# Patient Record
Sex: FEMALE | Race: BLACK OR AFRICAN AMERICAN | NOT HISPANIC OR LATINO | Employment: UNEMPLOYED | URBAN - METROPOLITAN AREA
[De-identification: names, ages, dates, MRNs, and addresses within clinical notes are randomized per-mention and may not be internally consistent; named-entity substitution may affect disease eponyms.]

---

## 2020-10-19 ENCOUNTER — PATIENT MESSAGE (OUTPATIENT)
Dept: INTERNAL MEDICINE | Facility: CLINIC | Age: 65
End: 2020-10-19

## 2020-10-19 ENCOUNTER — HOSPITAL ENCOUNTER (OUTPATIENT)
Dept: RADIOLOGY | Facility: HOSPITAL | Age: 65
Discharge: HOME | End: 2020-10-19
Attending: FAMILY MEDICINE
Payer: MEDICARE

## 2020-10-19 ENCOUNTER — OFFICE VISIT (OUTPATIENT)
Dept: INTERNAL MEDICINE | Facility: CLINIC | Age: 65
End: 2020-10-19
Payer: MEDICARE

## 2020-10-19 VITALS
DIASTOLIC BLOOD PRESSURE: 100 MMHG | SYSTOLIC BLOOD PRESSURE: 160 MMHG | WEIGHT: 234.13 LBS | HEIGHT: 67 IN | BODY MASS INDEX: 36.75 KG/M2 | TEMPERATURE: 97 F | OXYGEN SATURATION: 99 % | HEART RATE: 81 BPM

## 2020-10-19 DIAGNOSIS — M25.561 CHRONIC PAIN OF BOTH KNEES: ICD-10-CM

## 2020-10-19 DIAGNOSIS — M17.0 PRIMARY LOCALIZED OSTEOARTHRITIS OF KNEES, BILATERAL: Primary | ICD-10-CM

## 2020-10-19 DIAGNOSIS — Z78.0 ASYMPTOMATIC MENOPAUSAL STATE: ICD-10-CM

## 2020-10-19 DIAGNOSIS — Z11.59 ENCOUNTER FOR HEPATITIS C SCREENING TEST FOR LOW RISK PATIENT: ICD-10-CM

## 2020-10-19 DIAGNOSIS — Z23 NEED FOR SHINGLES VACCINE: ICD-10-CM

## 2020-10-19 DIAGNOSIS — Z11.4 SCREENING FOR HIV WITHOUT PRESENCE OF RISK FACTORS: ICD-10-CM

## 2020-10-19 DIAGNOSIS — E66.01 CLASS 2 SEVERE OBESITY DUE TO EXCESS CALORIES WITH SERIOUS COMORBIDITY AND BODY MASS INDEX (BMI) OF 37.0 TO 37.9 IN ADULT: Primary | ICD-10-CM

## 2020-10-19 DIAGNOSIS — I10 ESSENTIAL HYPERTENSION: ICD-10-CM

## 2020-10-19 DIAGNOSIS — G89.29 CHRONIC PAIN OF BOTH KNEES: ICD-10-CM

## 2020-10-19 DIAGNOSIS — Z12.31 BREAST CANCER SCREENING BY MAMMOGRAM: ICD-10-CM

## 2020-10-19 DIAGNOSIS — M17.0 PRIMARY LOCALIZED OSTEOARTHRITIS OF KNEES, BILATERAL: ICD-10-CM

## 2020-10-19 DIAGNOSIS — Z23 NEED FOR VACCINATION WITH 13-POLYVALENT PNEUMOCOCCAL CONJUGATE VACCINE: ICD-10-CM

## 2020-10-19 DIAGNOSIS — M25.562 CHRONIC PAIN OF BOTH KNEES: ICD-10-CM

## 2020-10-19 PROBLEM — E66.812 CLASS 2 SEVERE OBESITY DUE TO EXCESS CALORIES WITH SERIOUS COMORBIDITY AND BODY MASS INDEX (BMI) OF 37.0 TO 37.9 IN ADULT: Status: ACTIVE | Noted: 2020-10-19

## 2020-10-19 PROCEDURE — 73562 X-RAY EXAM OF KNEE 3: CPT | Mod: TC,50

## 2020-10-19 PROCEDURE — 73562 XR KNEE ORTHO BILAT: ICD-10-PCS | Mod: 26,50,, | Performed by: RADIOLOGY

## 2020-10-19 PROCEDURE — 90694 VACC AIIV4 NO PRSRV 0.5ML IM: CPT | Mod: S$GLB,,, | Performed by: FAMILY MEDICINE

## 2020-10-19 PROCEDURE — 3008F BODY MASS INDEX DOCD: CPT | Mod: CPTII,S$GLB,, | Performed by: FAMILY MEDICINE

## 2020-10-19 PROCEDURE — G0008 FLU VACCINE - QUADRIVALENT - ADJUVANTED: ICD-10-PCS | Mod: S$GLB,,, | Performed by: FAMILY MEDICINE

## 2020-10-19 PROCEDURE — 99999 PR PBB SHADOW E&M-NEW PATIENT-LVL V: CPT | Mod: PBBFAC,,, | Performed by: FAMILY MEDICINE

## 2020-10-19 PROCEDURE — G0009 PNEUMOCOCCAL CONJUGATE VACCINE 13-VALENT LESS THAN 5YO & GREATER THAN: ICD-10-PCS | Mod: S$GLB,,, | Performed by: FAMILY MEDICINE

## 2020-10-19 PROCEDURE — G0008 ADMIN INFLUENZA VIRUS VAC: HCPCS | Mod: S$GLB,,, | Performed by: FAMILY MEDICINE

## 2020-10-19 PROCEDURE — 90670 PNEUMOCOCCAL CONJUGATE VACCINE 13-VALENT LESS THAN 5YO & GREATER THAN: ICD-10-PCS | Mod: S$GLB,,, | Performed by: FAMILY MEDICINE

## 2020-10-19 PROCEDURE — 99499 UNLISTED E&M SERVICE: CPT | Mod: S$GLB,,, | Performed by: FAMILY MEDICINE

## 2020-10-19 PROCEDURE — G0009 ADMIN PNEUMOCOCCAL VACCINE: HCPCS | Mod: S$GLB,,, | Performed by: FAMILY MEDICINE

## 2020-10-19 PROCEDURE — 99203 OFFICE O/P NEW LOW 30 MIN: CPT | Mod: 25,S$GLB,, | Performed by: FAMILY MEDICINE

## 2020-10-19 PROCEDURE — 99999 PR PBB SHADOW E&M-NEW PATIENT-LVL V: ICD-10-PCS | Mod: PBBFAC,,, | Performed by: FAMILY MEDICINE

## 2020-10-19 PROCEDURE — 90694 FLU VACCINE - QUADRIVALENT - ADJUVANTED: ICD-10-PCS | Mod: S$GLB,,, | Performed by: FAMILY MEDICINE

## 2020-10-19 PROCEDURE — 1101F PR PT FALLS ASSESS DOC 0-1 FALLS W/OUT INJ PAST YR: ICD-10-PCS | Mod: CPTII,S$GLB,, | Performed by: FAMILY MEDICINE

## 2020-10-19 PROCEDURE — 1101F PT FALLS ASSESS-DOCD LE1/YR: CPT | Mod: CPTII,S$GLB,, | Performed by: FAMILY MEDICINE

## 2020-10-19 PROCEDURE — 3008F PR BODY MASS INDEX (BMI) DOCUMENTED: ICD-10-PCS | Mod: CPTII,S$GLB,, | Performed by: FAMILY MEDICINE

## 2020-10-19 PROCEDURE — 90670 PCV13 VACCINE IM: CPT | Mod: S$GLB,,, | Performed by: FAMILY MEDICINE

## 2020-10-19 PROCEDURE — 73562 X-RAY EXAM OF KNEE 3: CPT | Mod: 26,50,, | Performed by: RADIOLOGY

## 2020-10-19 PROCEDURE — 99499 RISK ADDL DX/OHS AUDIT: ICD-10-PCS | Mod: S$GLB,,, | Performed by: FAMILY MEDICINE

## 2020-10-19 PROCEDURE — 99203 PR OFFICE/OUTPT VISIT, NEW, LEVL III, 30-44 MIN: ICD-10-PCS | Mod: 25,S$GLB,, | Performed by: FAMILY MEDICINE

## 2020-10-19 RX ORDER — CHLORTHALIDONE 25 MG/1
25 TABLET ORAL DAILY
Qty: 30 TABLET | Refills: 1 | Status: SHIPPED | OUTPATIENT
Start: 2020-10-19 | End: 2020-11-13 | Stop reason: SDUPTHER

## 2020-10-19 RX ORDER — TRAMADOL HYDROCHLORIDE 50 MG/1
50 TABLET ORAL EVERY 6 HOURS PRN
COMMUNITY
End: 2023-08-17

## 2020-10-19 NOTE — PATIENT INSTRUCTIONS
Try to get more potassium in your diet. Foods high in potassium include white beans, potatoes and sweet potatoes, beets, spinach, tomato sauce, oranges and orange juice, bananas, avocados, yogurt, and salmon.      I've entered a referral order for you to be able to participate in [facility]'s excellent Comprehensive Weight Loss Program. Call [phone] to schedule your appointment. You can learn more about the program at [email or web address]      I've entered an order for you to be allowed to enroll in [facility]'s Hypertension Digital Medicine program. It allows you to use a good quality electronic blood pressure monitor to measure your blood pressure at home. Those numbers are then automatically sent by your smartphone to your hypertension care team for review. The program also includes excellent heart-health lifestyle coaching.    You'll receive regular feedback from your care team, including recommendations for any needed medication adjustments, exercise and healthy eating tips, heart-healthy coaching, and monthly progress reports.    Enrolling is easy and quick. Please take the time right now to stop by the [facility] in the first-floor lobby to get started. You can also call the [facility] Help Desk at [phone] for help getting set up and for any technical support.

## 2020-10-19 NOTE — LETTER
ATTN: CARE COORDINATION   PATIENT IDENTIFYING INFORMATION:  LACEY JORDAN   [address] [address] YOB: 1955  OUR MRN: [id number]   Home Phone [phone]   Work Phone Not on file.   Mobile [phone]        RECORDS REQUESTED FROM:   NAME OF HOSPITAL PHYSICIAN OR OTHER ENTITY  [facility]   ADDRESS PHONE         AUTHORIZATION:  For the purpose of continuity of care, I, Lacey Calderón, hereby authorize the hospital, physician, or entity named above to release my medical records for the dates of service specified above to: JAKE Merrill MD; [facility]; [address]; [place] [address]; PH: [phone]    REQUESTED METHOD OF DELIVERY: Fax ([phone]) or secure Email ([email or web address])  --------------------------------  SPECIFIC RECORDS REQUESTED:  colonoscopy and any other form of colorectal cancer screening    DATES OF SERVICE REQUESTED: 01/01/2015 through the present date  --------------------------------    In authorizing the release of the confidential information identified above, I hereby waive all restrictions or privileges imposed by law and release [facility] and Its affiliates and their staff from any restriction or privilege Imposed by law in connection with the disclosure or release of any professional record, observation or communication. I do understand that the information that is being released may be subject to re-disclosure by the recipient and may no longer be protected. I understand that my treatment, payment, enrollment or eligibility for benefits may not be conditioned on signing this authorization.  This authorization may be revoked in writing at any time, except to the extent that [facility] and its affiliates have already taken action in reliance on it. Letters to revoke this authorization should be addressed to [facility], Release  of Information Department, [address] [place] [id number].     If not previously revoked in writing, this authorization will terminate or  12 months after the date signed below.                   Signature of Patient    Date Signed

## 2020-10-19 NOTE — PROGRESS NOTES
CHIEF COMPLAINT  Establish Care    This is my first time treating her here. All problems addressed today are NEW TO ME.  Lacey is requesting that I take over as her primary care provider.     DIAGNOSES, HISTORY, ASSESSMENT, AND PLAN  Assessment   1. Class 2 severe obesity due to excess calories with serious comorbidity and body mass index (BMI) of 37.0 to 37.9 in adult    2. Primary localized osteoarthritis of knees, bilateral    3. Chronic pain of both knees    4. Essential hypertension    5. Screening for HIV without presence of risk factors    6. Encounter for hepatitis C screening test for low risk patient    7. Asymptomatic menopausal state    8. Breast cancer screening by mammogram    9. Need for shingles vaccine    10. Need for vaccination with 13-polyvalent pneumococcal conjugate vaccine         Orders Placed This Encounter    DXA Bone Density Spine And Hip    Mammo Digital Screening Bilat w/ Karel    Pneumococcal Conjugate Vaccine (13 Valent) (IM)    Influenza - Quadrivalent (Adjuvanted)    Comprehensive Metabolic Panel    Lipid Panel    Hepatitis C Antibody    TSH    HIV 1/2 Ag/Ab (4th Gen)    Ambulatory referral/consult to Weight Management Program    [facility]    varicella-zoster gE-AS01B, PF, (SHINGRIX) 50 mcg/0.5 mL injection    chlorthalidone (HYGROTEN) 25 MG Tab    [facility] Digital [facility] Assign Onboarding Questionnaires       Except as noted herein, any chronic conditions are compensated/controlled and stable, and no other significant new complaints or concerns reported.     Plan   Problem List Items Addressed This Visit     Primary localized osteoarthritis of knees, bilateral    Class 2 severe obesity due to excess calories with serious comorbidity and body mass index (BMI) of 37.0 to 37.9 in adult - Primary    Relevant Orders    TSH    Ambulatory referral/consult to Weight Management Program    Essential hypertension     Relevant Medications    chlorthalidone (HYGROTEN) 25 MG Tab    Other Relevant Orders    Comprehensive Metabolic Panel    Lipid Panel    TSH    Hypertension Digital Medicine (HDMP) Enrollment Order (Completed)    Hypertension Digital Medicine ([facility]): Assign Onboarding Questionnaires (Completed)    Chronic pain of both knees      Other Visit Diagnoses     Screening for HIV without presence of risk factors        Relevant Orders    HIV 1/2 Ag/Ab (4th Gen)    Encounter for hepatitis C screening test for low risk patient        Relevant Orders    Hepatitis C Antibody    Asymptomatic menopausal state        Relevant Orders    DXA Bone Density Spine And Hip    Breast cancer screening by mammogram        Relevant Orders    Mammo Digital Screening Bilat w/ Karel    Need for shingles vaccine        Relevant Medications    varicella-zoster gE-AS01B, PF, (SHINGRIX) 50 mcg/0.5 mL injection    Need for vaccination with 13-polyvalent pneumococcal conjugate vaccine        Relevant Orders    Pneumococcal Conjugate Vaccine (13 Valent) (IM) (Completed)          Outpatient Medications Prior to Visit   Medication Sig Dispense Refill    traMADoL (ULTRAM) 50 mg tablet Take 50 mg by mouth every 6 (six) hours as needed for Pain.       No facility-administered medications prior to visit.         There are no discontinued medications.     Medications Ordered This Encounter   Medications    chlorthalidone (HYGROTEN) 25 MG Tab     Sig: Take 1 tablet (25 mg total) by mouth once daily.     Dispense:  30 tablet     Refill:  1     - LABS REQUIRED FOR MORE REFILLS    varicella-zoster gE-AS01B, PF, (SHINGRIX) 50 mcg/0.5 mL injection     Sig: Inject 0.5 mL (one dose) into muscle now; give second dose at least 2 months later     Dispense:  1 each     Refill:  1       Subjective   Review of Systems   Constitutional: Negative for chills and fever.   HENT: Negative for ear pain and trouble swallowing.    Eyes: Negative for pain and visual disturbance.  "  Respiratory: Negative for chest tightness and shortness of breath.    Cardiovascular: Negative for chest pain and palpitations.   Gastrointestinal: Negative for abdominal pain and blood in stool.   Endocrine: Negative for polydipsia and polyuria.   Genitourinary: Negative.  Negative for difficulty urinating, dysuria and hematuria.   Musculoskeletal: Negative for gait problem and joint swelling. Arthralgias:  knees (chronic x years)   Integumentary:  Negative for rash and wound.   Allergic/Immunologic: Negative for immunocompromised state.   Neurological: Negative for vertigo and syncope.   Hematological: Negative.    Psychiatric/Behavioral: Negative for agitation and confusion.        Objective   Vitals:    10/19/20 0852 10/19/20 0925   BP: (!) 150/88 (!) 160/100   BP Location: Left arm Left arm   Patient Position: Sitting Sitting   BP Method: Large (Manual) Large (Manual)   Pulse: 81    Temp: 97.2 °F (36.2 °C)    TempSrc: Tympanic    SpO2: 99%    Weight: 106.2 kg (234 lb 2.1 oz)    Height: 5' 6.5" (1.689 m)      Physical Exam  Vitals signs reviewed.   Constitutional:       General: She is not in acute distress.     Appearance: Normal appearance.   Eyes:      General: No scleral icterus.     Conjunctiva/sclera: Conjunctivae normal.   Neck:      Musculoskeletal: No muscular tenderness.      Vascular: No carotid bruit.   Cardiovascular:      Rate and Rhythm: Normal rate and regular rhythm.      Heart sounds: Normal heart sounds.   Pulmonary:      Effort: Pulmonary effort is normal. No respiratory distress.      Breath sounds: Normal breath sounds. No wheezing or rhonchi.   Abdominal:      General: Bowel sounds are normal. There is no distension.      Palpations: Abdomen is soft. There is no mass.      Tenderness: There is no abdominal tenderness.   Lymphadenopathy:      Cervical: No cervical adenopathy.   Skin:     General: Skin is warm and dry.      Coloration: Skin is not jaundiced.   Neurological:      General: " "No focal deficit present.      Mental Status: She is alert and oriented to person, place, and time.   Psychiatric:         Mood and Affect: Mood normal.         Behavior: Behavior normal.         Judgment: Judgment normal.         TOTAL TIME evaluating and managing this patient for this encounter exceeded 30 minutes, the majority spent counseling and coordinating care for the listed diagnoses.   Documentation entered by me for this encounter may have been done in part using speech-recognition technology. Although I have made an effort to ensure accuracy, "sound like" errors may exist and should be interpreted in context. -JAKE Merrill MD.    "

## 2020-10-20 ENCOUNTER — TELEPHONE (OUTPATIENT)
Dept: ORTHOPEDICS | Facility: CLINIC | Age: 65
End: 2020-10-20

## 2020-10-20 ENCOUNTER — HOSPITAL ENCOUNTER (OUTPATIENT)
Dept: RADIOLOGY | Facility: HOSPITAL | Age: 65
Discharge: HOME | End: 2020-10-20
Attending: FAMILY MEDICINE
Payer: MEDICARE

## 2020-10-20 DIAGNOSIS — Z12.31 BREAST CANCER SCREENING BY MAMMOGRAM: ICD-10-CM

## 2020-10-20 PROCEDURE — 77063 MAMMO DIGITAL SCREENING BILAT WITH TOMO: ICD-10-PCS | Mod: 26,,, | Performed by: RADIOLOGY

## 2020-10-20 PROCEDURE — 77067 SCR MAMMO BI INCL CAD: CPT | Mod: 26,,, | Performed by: RADIOLOGY

## 2020-10-20 PROCEDURE — 77063 BREAST TOMOSYNTHESIS BI: CPT | Mod: 26,,, | Performed by: RADIOLOGY

## 2020-10-20 PROCEDURE — 77067 SCR MAMMO BI INCL CAD: CPT | Mod: TC

## 2020-10-20 PROCEDURE — 77067 MAMMO DIGITAL SCREENING BILAT WITH TOMO: ICD-10-PCS | Mod: 26,,, | Performed by: RADIOLOGY

## 2020-10-20 NOTE — TELEPHONE ENCOUNTER
"Lacey Fischer.    Your knee x-rays showed fairly severe "degenerative" or "wear-and-tear" type changes. The x-rays did NOT show any fracture or dislocation.    I have entered a referral order for you to be treated by one of our excellent orthopedic surgeons (a specialist that diagnoses and treats diseases, injuries, and defects of bones and joints) to discuss your treatment options.    Someone from [facility] will be contacting you soon to help you schedule this appointment. If you haven't been contacted by Wednesday afternoon, send my staff a message, and they will be glad to help.    Thanks for letting me care for you, and thanks for trusting [facility] with your healthcare needs.    Take care and be well.    Sincerely,    JAKE Merrill MD   Orders Placed This Encounter   Procedures    Ambulatory referral/consult to Orthopedics      "

## 2020-10-20 NOTE — PROGRESS NOTES
"Lacey Fischer.    Your knee x-rays showed fairly severe "degenerative" or "wear-and-tear" type changes. The x-rays did NOT show any fracture or dislocation.    I have entered a referral order for you to be treated by one of our excellent orthopedic surgeons (a specialist that diagnoses and treats diseases, injuries, and defects of bones and joints) to discuss your treatment options.    Someone from [facility] will be contacting you soon to help you schedule this appointment. If you haven't been contacted by Wednesday afternoon, send my staff a message, and they will be glad to help.    Thanks for letting me care for you, and thanks for trusting [facility] with your healthcare needs.    Take care and be well.    Sincerely,    JAKE Merrill MD"

## 2020-10-24 PROBLEM — E78.00 PURE HYPERCHOLESTEROLEMIA: Chronic | Status: ACTIVE | Noted: 2020-10-24

## 2020-10-24 NOTE — PROGRESS NOTES
"Lacey Fischer.    I am proud of you for getting your breast cancer screening done!    The radiologist interpreted your test result findings as INCOMPLETE. This category is also called "BI-RADS 0." This means the radiologist may have seen a possible abnormality, but it was not clear.    This result category may mean that you need more tests, such as another mammogram with the use of spot compression (applying compression to a smaller area when doing the mammogram), magnified views, special mammogram views, or ultrasound. This category may also suggest that the radiologist wants to compare your new mammogram with older ones to see if there have been changes in the area over time.    Someone from [facility] breast imaging center should be contacting you with details. Let me know if you haven't heard from them within the next two weeks.    I'll share your final (complete) breast imaging test results with you once I have received them.    Thanks for letting me care for you, and thanks for trusting [facility] with your healthcare needs.    Sincerely,    JAKE Merrill MD"

## 2020-10-26 ENCOUNTER — TELEPHONE (OUTPATIENT)
Dept: ORTHOPEDICS | Facility: CLINIC | Age: 65
End: 2020-10-26

## 2020-10-26 ENCOUNTER — TELEPHONE (OUTPATIENT)
Dept: RADIOLOGY | Facility: HOSPITAL | Age: 65
End: 2020-10-26

## 2020-10-26 ENCOUNTER — OFFICE VISIT (OUTPATIENT)
Dept: ORTHOPEDICS | Facility: CLINIC | Age: 65
End: 2020-10-26
Payer: MEDICARE

## 2020-10-26 VITALS — BODY MASS INDEX: 36.73 KG/M2 | HEIGHT: 67 IN | WEIGHT: 234 LBS

## 2020-10-26 DIAGNOSIS — R26.9 GAIT ABNORMALITY: ICD-10-CM

## 2020-10-26 DIAGNOSIS — M17.0 PRIMARY LOCALIZED OSTEOARTHRITIS OF KNEES, BILATERAL: Primary | ICD-10-CM

## 2020-10-26 DIAGNOSIS — G89.29 CHRONIC PAIN OF BOTH KNEES: ICD-10-CM

## 2020-10-26 DIAGNOSIS — M25.562 CHRONIC PAIN OF BOTH KNEES: ICD-10-CM

## 2020-10-26 DIAGNOSIS — M25.561 CHRONIC PAIN OF BOTH KNEES: ICD-10-CM

## 2020-10-26 PROCEDURE — 99204 PR OFFICE/OUTPT VISIT, NEW, LEVL IV, 45-59 MIN: ICD-10-PCS | Mod: S$GLB,,, | Performed by: PHYSICAL MEDICINE & REHABILITATION

## 2020-10-26 PROCEDURE — 99999 PR PBB SHADOW E&M-EST. PATIENT-LVL IV: ICD-10-PCS | Mod: PBBFAC,,, | Performed by: PHYSICAL MEDICINE & REHABILITATION

## 2020-10-26 PROCEDURE — 1101F PR PT FALLS ASSESS DOC 0-1 FALLS W/OUT INJ PAST YR: ICD-10-PCS | Mod: CPTII,S$GLB,, | Performed by: PHYSICAL MEDICINE & REHABILITATION

## 2020-10-26 PROCEDURE — 3008F BODY MASS INDEX DOCD: CPT | Mod: CPTII,S$GLB,, | Performed by: PHYSICAL MEDICINE & REHABILITATION

## 2020-10-26 PROCEDURE — 3008F PR BODY MASS INDEX (BMI) DOCUMENTED: ICD-10-PCS | Mod: CPTII,S$GLB,, | Performed by: PHYSICAL MEDICINE & REHABILITATION

## 2020-10-26 PROCEDURE — 99204 OFFICE O/P NEW MOD 45 MIN: CPT | Mod: S$GLB,,, | Performed by: PHYSICAL MEDICINE & REHABILITATION

## 2020-10-26 PROCEDURE — 99999 PR PBB SHADOW E&M-EST. PATIENT-LVL IV: CPT | Mod: PBBFAC,,, | Performed by: PHYSICAL MEDICINE & REHABILITATION

## 2020-10-26 PROCEDURE — 1101F PT FALLS ASSESS-DOCD LE1/YR: CPT | Mod: CPTII,S$GLB,, | Performed by: PHYSICAL MEDICINE & REHABILITATION

## 2020-10-26 NOTE — PATIENT INSTRUCTIONS
Recommend trying Voltaren Gel over the counter, and following the directions on the package.   Remember that you may need to use it consistently for several days before seeing results.        Getting Ready for Knee Replacement: Preparing for Your Recovery     Aerobic exercise, such as riding a stationary bike, can help improve your general fitness and ease your recovery.     Preparing for your knee replacement helps make your recovery faster and smoother. You can even prepare for your rehabilitation program that youll follow after surgery. It will help you strengthen and use your new knee.  Why preparing for recovery helps  The more in shape you are before surgery, the sooner youll be able to get back to activities you enjoy.  Help recovery faster by:  · Strengthening and stretching your leg muscles. This helps to support the knee as it heals. It also gives you a head start on rehabilitation.  · Preparing to use a walker or crutches. Learn to safely use walking aids before surgery. This will help you get up and around sooner. Strengthening your upper body can also make it easier to use walking aids.  · Preparing your home. Make some simple arrangements at home. These can prevent falls. They can also make daily tasks easier as you recover. This includes moving objects youll need within reach and asking in advance for help with certain chores. You should also remove objects on the floor that could cause a fall, such as loose rugs and cords.   You and your team  Your healthcare team may include:  · A physical therapist (PT). He or she will design an exercise program to build strength and aid recovery.  · An occupational therapist (OT). He or she will help you make daily activities safer and easier during recovery.  · An orthopaedic surgeon. He or she will perform the surgery and oversee your care.  · A nurse or . He or she will coordinate your care.  Understanding your role  When it comes to preparing  for recovery, much of the work is up to you. So make time each day for the exercises youve been given. Always follow the instructions that your healthcare team gives you.  Date Last Reviewed: 10/1/2015  © 6944-6547 [employer]. [address] [place] [address]. All rights reserved. This information is not intended as a substitute for professional medical care. Always follow your healthcare professional's instructions.        Total Knee Replacement  During total knee replacement surgery, your damaged knee joint is replaced with an artificial joint, called a prosthesis. This surgery almost always reduces joint pain and improves your quality of life.     The parts of the prosthesis are secured to the bones of the knee. Together they form the new joint.   Before your surgery  You will most likely arrive at the hospital on the morning of the surgery. Be sure to follow all of your doctors instructions on preparing for surgery:  · Follow any directions you are given for taking medicines or for not eating or drinking before surgery.  · At the hospital, your temperature, pulse, breathing, and blood pressure will be checked.  · An IV (intravenous) line will be started to provide fluids and medicines needed during surgery.  The surgical procedure  When the surgical team is ready, youll be taken to the operating room. There youll be given anesthesia to help you sleep through surgery, or to make you numb from the waist down. Then an incision is made on the front or side of your knee. Any damaged bone is cleaned away, and the new joint components are put into place. The incision is closed with surgical staples or stitches.  After your surgery  After surgery, youll be sent to the recovery room. When you are fully awake, youll be moved to your room. The nurses will give you medicines to ease your pain. You may have a catheter (small tube) in your bladder. A continuous passive motion machine may be used  on your knee to keep it from getting stiff. A sequential compression machine may be used to prevent blood clots by gently squeezing then releasing your leg. You may be given medicine to prevent blood clots. Soon, healthcare providers will help you get up and moving.  When to call your doctor  Once at home, call your doctor if you have any of the symptoms below:  · An increase in knee pain  · Pain or swelling in the calf or leg  · Unusual redness, heat, or drainage at the incision site  · Fever of 100.4°F (38°C) or higher  · Shaking chills  · Trouble breathing or chest pain (call 911)   Date Last Reviewed: 9/20/2015  © 2433-0336 [employer]. [address] [place] [address], [place] [address]. All rights reserved. This information is not intended as a substitute for professional medical care. Always follow your healthcare professional's instructions.        Conditioning Before Knee Replacement  Conditioning your body BEFORE knee replacement can help speed your recovery. Daily exercise helps strengthen muscles that support the knee joint. Aerobic activity (exercise that raises your heart rate) can improve fitness. It can also help you reach or maintain a healthy weight, reducing stress on your knee.  Before beginning any exercise program, talk with your healthcare provider.   Low-impact exercise  Try exercise that doesnt put too much weight on your knee, such as riding a stationary bike or swimming. This helps build strength in your knee with little stress on the joint. Start slowly. Then try to do a little more each day. Stop any exercise that causes increasing pain. You can also ask your healthcare provider or physical therapist about ways to manage pain during exercise.  Pool exercise     Walk in a level area of the pool.      Exercising in a pool is a gentle way to exercise muscles. It can improve balance and coordination. A physical therapist may work with you in a pool therapy program. You can also try pool  walking on your own.  waist-deep to chest-deep water with your arms out to the sides. Slowly walk forward. To avoid overdoing, ask your physical therapist or surgeon for guidelines on how long to exercise. He or she can also give you tips on pool safety.  Call your healthcare provider   Contact your healthcare provider if:   · You have questions about the exercises.  · There is increased pain or swelling in your knee after exercise.  · You need help learning to use your walker or crutches.   Date Last Reviewed: 10/1/2015  © 1964-4152 [employer]. [address] [place] [address]. All rights reserved. This information is not intended as a substitute for professional medical care. Always follow your healthcare professional's instructions.

## 2020-10-26 NOTE — LETTER
October 26, 2020      JAKE Merrill MD  [address]  [place] [address]           [facility]  [address] [place] [address]  Phone: [phone]  Fax: [phone]          Patient: Lacey Calderón   MR Number: [id number]   YOB: 1955   Date of Visit: 10/26/2020       Dear Dr. JAKE Merrill:    Thank you for referring Lacey Calderón to me for evaluation. Attached you will find relevant portions of my assessment and plan of care.    If you have questions, please do not hesitate to call me. I look forward to following Lacey Calderón along with you.    Sincerely,    Gwen Merritt MD    Enclosure  CC:  No Recipients    If you would like to receive this communication electronically, please contact [email or web address] or [phone] to request more information on [employer] access.    For providers and/or their staff who would like to refer a patient to [facility], please contact us through our one-stop-shop provider referral line, [facility], at [phone].    If you feel you have received this communication in error or would no longer like to receive these types of communications, please e-mail [email or web address]

## 2020-10-26 NOTE — TELEPHONE ENCOUNTER
Good afternoon,     Spoke w/ patient in regards to getting a closer doctor for her knees. Informed patient that Dr. Bassett is our knee specialist and he specializes in knee replacements. Patient states that was fine and agreed to keep appt. expressed to patient that if she needed anything to let us know. Patient was grateful for the call_DD      ----- Message from Itzel Bhatti sent at 10/26/2020  2:34 PM CDT -----  Pt called to speak with the office about the referral please reach out to pt at [phone]

## 2020-10-26 NOTE — PROGRESS NOTES
SPORTS MEDICINE / PM&R New Patient Visit :    Referring Physician: JAKE Merrill MD    Chief Complaint   Patient presents with    Right Knee - Pain    Left Knee - Pain       HPI: This is a 65 y.o.  female being seen in clinic today for evaluation of Pain of the Right Knee and Pain of the Left Knee  .  The problem began since her 30s. She state she fell down some stair in 1998. She complain of most of her pain in her right knee. She feels throbbing, aching, sore, stabbing and intermittent pain on her bilateral knee . The symptoms are worsening. She has tried heat, tylenol and rest. She states she takes Mobic, she doesn't take it anymore due to stomach pain. She also soaks in warm water without improvement. She has done therapy, but states it didn't help. She also mention she had 2 falls last week. She states she was walking and fell to the ground. She also walks with a cane.    History obtained from patient.    Past family, medical, social, surgical history, and vital signs reviewed in chart.    Review of Systems   Constitutional: Negative for chills, fever and weight loss.   HENT: Negative for hearing loss and sore throat.    Eyes: Negative for blurred vision, photophobia and pain.   Respiratory: Negative for shortness of breath.    Cardiovascular: Negative for chest pain.   Gastrointestinal: Negative for abdominal pain.   Genitourinary: Negative for dysuria.   Skin: Negative for rash.   Neurological: Negative for tingling and headaches.   Endo/Heme/Allergies: Does not bruise/bleed easily.   Psychiatric/Behavioral: Negative for depression.       General    Nursing note and vitals reviewed.  Constitutional: She is oriented to person, place, and time. She appears well-developed and well-nourished.   Obese   HENT:   Head: Normocephalic and atraumatic.   Eyes: Conjunctivae and EOM are normal. Pupils are equal, round, and reactive to light.   Neck: Neck supple.   Cardiovascular: Intact distal pulses.     Pulmonary/Chest: Effort normal. No respiratory distress.   Abdominal: She exhibits no distension.   Neurological: She is alert and oriented to person, place, and time.   Psychiatric: She has a normal mood and affect.     General Musculoskeletal Exam   Gait: antalgic       Right Knee Exam     Inspection   Erythema: absent  Swelling: absent  Effusion: present (small)  Deformity: absent    Tenderness   The patient is tender to palpation of the medial joint line and condyle.    Crepitus   The patient has crepitus of the patella.    Range of Motion   Extension:  5 abnormal   Flexion:  100 abnormal     Tests   Meniscus   Rogelio:  Medial - negative Lateral - negative  Ligament Examination Lachman: normal (-1 to 2mm) PCL-Posterior Drawer: normal (0 to 2mm)     MCL - Valgus: abnormal - grade I  LCL - Varus: normal  Patella   Patellar apprehension: negative  Patellar Tracking: normal  Patellar Grind: positive    Other   Popliteal (Baker's) Cyst: absent  Muscle Tightness: hamstring tightness  Sensation: normal    Left Knee Exam     Inspection   Erythema: absent  Swelling: absent  Effusion: absent  Deformity: absent  Bruising: absent    Tenderness   The patient tender to palpation of the medial joint line.    Crepitus   The patient has crepitus of the patella.    Range of Motion   Extension: normal   Flexion:  110 abnormal     Tests   Meniscus   Rogelio:  Medial - negative Lateral - negative  Stability Lachman: normal (-1 to 2mm) PCL-Posterior Drawer: normal (0 to 2mm)  MCL - Valgus: normal (0 to 2mm)  LCL - Varus: normal (0 to 2mm)  Patella   Patellar apprehension: negative  Patellar Tracking: normal  Patellar Grind: positive    Other   Popliteal (Baker's) Cyst: absent  Sensation: normal    Right Hip Exam   Right hip exam is normal.     Tests   Pain w/ forced internal rotation (PADMA): absent  Left Hip Exam   Left hip exam is normal.    Tests   Pain w/ forced internal rotation (PADMA): absent          Muscle Strength    Right Lower Extremity   Hip Abduction: 3/5   Hip Adduction: 5/5   Hip Flexion: 5/5   Quadriceps:  4/5   Hamstrin/5   Left Lower Extremity   Hip Abduction: 3/5   Hip Adduction: 5/5   Hip Flexion: 5/5   Quadriceps:  4/5   Hamstrin/5     Reflexes     Left Side  Achilles:  1+  Quadriceps:  1+    Right Side   Achilles:  1+  Quadriceps:  1+    Vascular Exam     Right Pulses  Dorsalis Pedis:      2+          Left Pulses  Dorsalis Pedis:      2+              X-ray Knee Ortho Bilateral    Result Date: 10/19/2020  EXAMINATION: XR KNEE ORTHO BILAT CLINICAL HISTORY: Bilateral primary osteoarthritis of knee TECHNIQUE: AP standing, lateral and Merchant views of both knees were performed. COMPARISON: None FINDINGS: Extensive the tricompartment degenerative changes noted with increased prominence and medial compartments, right greater than left.  There is lateral subluxation of the tibia bilaterally.  Prominent osteophyte formation best visualized on the anterior margin of the tibia on the lateral views.  Prominent degenerative change in the patellofemoral joints with lateral tilting of the patellae bilaterally.  No effusions. No fracture or dislocation. Calcific densities along posterior margins of the knee joints bilaterally. Cannot exclude loose bodies.     As above.  Follow-up and or further evaluation as warranted. Electronically signed by: Chito Camejo MD Date:    10/19/2020 Time:    12:59      IMPRESSION/PLAN: This is a 65 y.o.  female with:    Primary localized osteoarthritis of knees, bilateral  -     Ambulatory referral/consult to Orthopedics  -     Ambulatory referral/consult to Physical/Occupational Therapy; Future; Expected date: 2020  -     Ambulatory referral/consult to Orthopedics; Future; Expected date: 2020    Chronic pain of both knees  -     Ambulatory referral/consult to Orthopedics  -     Ambulatory referral/consult to Physical/Occupational Therapy; Future; Expected date: 2020  -      Ambulatory referral/consult to Orthopedics; Future; Expected date: 11/02/2020    Gait abnormality        The diagnosis and treatment options were discussed with Lacey in detail.  We reviewed her x-rays which show pretty severe arthritis in the right knee, worse in the left knee, medial aspect involved more on both sides.  She has not tolerated oral NSAIDs we will try a topical NSAID as Voltaren gel.  She was given a handout on this.  We discussed risks versus benefit of steroid injection.  She had not previously considered knee joint replacement, but is now interested in it.  We discussed that steroid injection can increase risk of infection following joint replacement within a certain time period, and she therefore decided to hold off.  We will get her into physical therapy here at [facility] to work on strength, stability, and gait.  I have her follow-up with Dr. Moore to discuss knee joint replacement.  She was provided with this plan in writing. All of her questions were answered. She will follow up with me p.r.n.     Disclaimer: This note was prepared using a voice recognition system and is likely to have sound alike errors within the text.     Gwen Merritt M.D.  Sports Medicine  3

## 2020-10-27 ENCOUNTER — HOSPITAL ENCOUNTER (OUTPATIENT)
Dept: RADIOLOGY | Facility: HOSPITAL | Age: 65
Discharge: HOME | End: 2020-10-27
Attending: FAMILY MEDICINE
Payer: MEDICARE

## 2020-10-27 DIAGNOSIS — R92.8 ABNORMAL MAMMOGRAM: ICD-10-CM

## 2020-10-27 PROCEDURE — 76642 ULTRASOUND BREAST LIMITED: CPT | Mod: TC,RT

## 2020-10-27 PROCEDURE — 76642 ULTRASOUND BREAST LIMITED: CPT | Mod: 26,RT,, | Performed by: RADIOLOGY

## 2020-10-27 PROCEDURE — 76642 US BREAST RIGHT LIMITED: ICD-10-PCS | Mod: 26,RT,, | Performed by: RADIOLOGY

## 2020-11-03 NOTE — PROGRESS NOTES
"Hi, Lacey.    I am proud of you for getting your breast cancer screening done!    I was very happy to see that the radiologist interpreted your test result findings as "BENIGN."    This is considered a NEGATIVE mammogram result. (In this case, negative means that nothing bad was found.)    An annual mammogram is the best test to screen for breast cancer, but it is not perfect, and it can miss some cancers. So, even though your mammogram was negative, if you notice any lump or change in one of your breasts, please schedule an appointment with me for a proper evaluation.    Thanks for letting me care for you, and thanks for trusting [facility] with your healthcare needs.    Sincerely,    JAKE Merrill MD"

## 2020-11-05 ENCOUNTER — PATIENT MESSAGE (OUTPATIENT)
Dept: ORTHOPEDICS | Facility: CLINIC | Age: 65
End: 2020-11-05

## 2020-11-13 ENCOUNTER — PATIENT MESSAGE (OUTPATIENT)
Dept: ADMINISTRATIVE | Facility: OTHER | Age: 65
End: 2020-11-13

## 2020-11-13 ENCOUNTER — OFFICE VISIT (OUTPATIENT)
Dept: INTERNAL MEDICINE | Facility: CLINIC | Age: 65
End: 2020-11-13
Payer: MEDICARE

## 2020-11-13 ENCOUNTER — HOSPITAL ENCOUNTER (OUTPATIENT)
Dept: CARDIOLOGY | Facility: HOSPITAL | Age: 65
Discharge: HOME | End: 2020-11-13
Attending: FAMILY MEDICINE
Payer: MEDICARE

## 2020-11-13 VITALS
TEMPERATURE: 97 F | HEART RATE: 100 BPM | WEIGHT: 234.81 LBS | DIASTOLIC BLOOD PRESSURE: 80 MMHG | BODY MASS INDEX: 37.74 KG/M2 | SYSTOLIC BLOOD PRESSURE: 140 MMHG | OXYGEN SATURATION: 97 % | HEIGHT: 66 IN

## 2020-11-13 DIAGNOSIS — E78.00 PURE HYPERCHOLESTEROLEMIA: Chronic | ICD-10-CM

## 2020-11-13 DIAGNOSIS — I10 ESSENTIAL HYPERTENSION: ICD-10-CM

## 2020-11-13 DIAGNOSIS — I49.9 IRREGULAR HEART RHYTHM: ICD-10-CM

## 2020-11-13 DIAGNOSIS — I10 ESSENTIAL HYPERTENSION: Primary | ICD-10-CM

## 2020-11-13 PROCEDURE — 1126F AMNT PAIN NOTED NONE PRSNT: CPT | Mod: S$GLB,,, | Performed by: FAMILY MEDICINE

## 2020-11-13 PROCEDURE — 99999 PR PBB SHADOW E&M-EST. PATIENT-LVL IV: CPT | Mod: PBBFAC,,, | Performed by: FAMILY MEDICINE

## 2020-11-13 PROCEDURE — 99999 PR PBB SHADOW E&M-EST. PATIENT-LVL IV: ICD-10-PCS | Mod: PBBFAC,,, | Performed by: FAMILY MEDICINE

## 2020-11-13 PROCEDURE — 1126F PR PAIN SEVERITY QUANTIFIED, NO PAIN PRESENT: ICD-10-PCS | Mod: S$GLB,,, | Performed by: FAMILY MEDICINE

## 2020-11-13 PROCEDURE — 93010 EKG 12-LEAD: ICD-10-PCS | Mod: ,,, | Performed by: INTERNAL MEDICINE

## 2020-11-13 PROCEDURE — 3008F PR BODY MASS INDEX (BMI) DOCUMENTED: ICD-10-PCS | Mod: CPTII,S$GLB,, | Performed by: FAMILY MEDICINE

## 2020-11-13 PROCEDURE — 99214 PR OFFICE/OUTPT VISIT, EST, LEVL IV, 30-39 MIN: ICD-10-PCS | Mod: S$GLB,,, | Performed by: FAMILY MEDICINE

## 2020-11-13 PROCEDURE — 93005 ELECTROCARDIOGRAM TRACING: CPT

## 2020-11-13 PROCEDURE — 93010 ELECTROCARDIOGRAM REPORT: CPT | Mod: ,,, | Performed by: INTERNAL MEDICINE

## 2020-11-13 PROCEDURE — 3008F BODY MASS INDEX DOCD: CPT | Mod: CPTII,S$GLB,, | Performed by: FAMILY MEDICINE

## 2020-11-13 PROCEDURE — 99214 OFFICE O/P EST MOD 30 MIN: CPT | Mod: S$GLB,,, | Performed by: FAMILY MEDICINE

## 2020-11-13 RX ORDER — CHLORTHALIDONE 25 MG/1
25 TABLET ORAL DAILY
Qty: 30 TABLET | Refills: 1 | Status: SHIPPED | OUTPATIENT
Start: 2020-11-13 | End: 2021-04-01

## 2020-11-13 RX ORDER — ATORVASTATIN CALCIUM 20 MG/1
20 TABLET, FILM COATED ORAL NIGHTLY
Qty: 90 TABLET | Refills: 2 | Status: SHIPPED | OUTPATIENT
Start: 2020-11-13 | End: 2021-07-02

## 2020-11-13 RX ORDER — AMLODIPINE BESYLATE 5 MG/1
5 TABLET ORAL DAILY
Qty: 30 TABLET | Refills: 5 | Status: SHIPPED | OUTPATIENT
Start: 2020-11-13 | End: 2021-07-02

## 2020-11-13 NOTE — ASSESSMENT & PLAN NOTE
BP Readings from Last 6 Encounters:   11/13/20 (!) 140/80   10/19/20 (!) 160/100     Last 5 Patient Entered Readings                                      Current 30 Day Average:      There is no flowsheet data to display.      Improved, but not controlled. Asymptomatic. She appears to be tolerating her current medication well and reports preceiving no adverse side-effects.

## 2020-11-13 NOTE — PROGRESS NOTES
CHIEF COMPLAINT  Follow-up (3wk f/u)      SUMMARY OF DIAGNOSES AND NEW ORDERS  Assessment   Essential hypertension  -     Potassium; Future; Expected date: 11/13/2020  -     SCHEDULED EKG 12-LEAD (to Muse); Future  -     amLODIPine (NORVASC) 5 MG tablet; Take 1 tablet (5 mg total) by mouth once daily.  Dispense: 30 tablet; Refill: 5  -     chlorthalidone (HYGROTEN) 25 MG Tab; Take 1 tablet (25 mg total) by mouth once daily.  Dispense: 30 tablet; Refill: 1  -     Hypertension Digital Medicine (HDMP) Enrollment Order    Pure hypercholesterolemia  -     atorvastatin (LIPITOR) 20 MG tablet; Take 1 tablet (20 mg total) by mouth every evening.  Dispense: 90 tablet; Refill: 2  -     Lipid Panel; Future; Expected date: 05/13/2021  -     AST (SGOT); Future; Expected date: 05/13/2021  -     ALT (SGPT); Future; Expected date: 05/13/2021    Irregular heart rhythm  -     SCHEDULED EKG 12-LEAD (to Muse); Future  -     Holter monitor - 24 hour; Future         Except as noted herein, any chronic conditions are compensated/controlled and stable, and no other significant new complaints or concerns reported.     Plan   Problem List Items Addressed This Visit     Essential hypertension - Primary    Current Assessment & Plan     BP Readings from Last 6 Encounters:   11/13/20 (!) 140/80   10/19/20 (!) 160/100     Last 5 Patient Entered Readings                                      Current 30 Day Average:      There is no flowsheet data to display.      Improved, but not controlled. Asymptomatic. She appears to be tolerating her current medication well and reports preceiving no adverse side-effects.          Relevant Medications    amLODIPine (NORVASC) 5 MG tablet    chlorthalidone (HYGROTEN) 25 MG Tab    Other Relevant Orders    Potassium    SCHEDULED EKG 12-LEAD (to Muse)    Hypertension Digital Medicine (HDMP) Enrollment Order (Completed)    Irregular heart rhythm    Current Assessment & Plan     Irregular trigeminal rhythm on exam.          Relevant Orders    SCHEDULED EKG 12-LEAD (to Muse)    Holter monitor - 24 hour    Pure hypercholesterolemia (Chronic)    Current Assessment & Plan     Lab Results   Component Value Date    CHOL 255 (H) 10/19/2020    TRIG 99 10/19/2020    HDL 66 10/19/2020    LDLCALC 169.2 (H) 10/19/2020    NONHDLCHOL 189 10/19/2020    AST 20 10/19/2020    ALT 21 10/19/2020     The 10-year ASCVD risk score (Renetta HANEY Jr., et al., 2013) is: 13.4%    Values used to calculate the score:      Age: 65 years      Sex: Female      Is Non- : Yes      Diabetic: No      Tobacco smoker: No      Systolic Blood Pressure: 140 mmHg      Is BP treated: Yes      HDL Cholesterol: 66 mg/dL      Total Cholesterol: 255 mg/dL         Relevant Medications    atorvastatin (LIPITOR) 20 MG tablet    Other Relevant Orders    Lipid Panel    AST (SGOT)    ALT (SGPT)          Outpatient Medications Prior to Visit   Medication Sig Dispense Refill    traMADoL (ULTRAM) 50 mg tablet Take 50 mg by mouth every 6 (six) hours as needed for Pain.      varicella-zoster gE-AS01B, PF, (SHINGRIX) 50 mcg/0.5 mL injection Inject 0.5 mL (one dose) into muscle now; give second dose at least 2 months later 1 each 1    chlorthalidone (HYGROTEN) 25 MG Tab Take 1 tablet (25 mg total) by mouth once daily. 30 tablet 1     No facility-administered medications prior to visit.         Medications Discontinued During This Encounter   Medication Reason    chlorthalidone (HYGROTEN) 25 MG Tab Reorder        Medications Ordered This Encounter   Medications    amLODIPine (NORVASC) 5 MG tablet     Sig: Take 1 tablet (5 mg total) by mouth once daily.     Dispense:  30 tablet     Refill:  5    atorvastatin (LIPITOR) 20 MG tablet     Sig: Take 1 tablet (20 mg total) by mouth every evening.     Dispense:  90 tablet     Refill:  2    chlorthalidone (HYGROTEN) 25 MG Tab     Sig: Take 1 tablet (25 mg total) by mouth once daily.     Dispense:  30 tablet     Refill:  1  "      Follow up in about 11 months (around 10/13/2021) for wellness and preventive services.     Subjective   Review of Systems   Constitutional: Negative for chills and fever.   Respiratory: Negative for chest tightness and shortness of breath.    Cardiovascular: Negative for chest pain.   Endocrine: Negative for polydipsia and polyuria.   Neurological: Negative for syncope and light-headedness.        Objective   Vitals:    11/13/20 0845   BP: (!) 140/80   BP Location: Right arm   Patient Position: Sitting   BP Method: Large (Manual)   Pulse: 100   Temp: 97.2 °F (36.2 °C)   TempSrc: Tympanic   SpO2: 97%   Weight: 106.5 kg (234 lb 12.6 oz)   Height: 5' 6" (1.676 m)     Physical Exam  Vitals signs reviewed.   Constitutional:       General: She is not in acute distress.     Appearance: Normal appearance. She is not ill-appearing or diaphoretic.   Eyes:      General: No scleral icterus.  Cardiovascular:      Rate and Rhythm: Normal rate.      Comments: Irregular trigeminal rhythm.  Pulmonary:      Effort: Pulmonary effort is normal.      Breath sounds: Normal breath sounds.   Skin:     General: Skin is warm and dry.   Neurological:      General: No focal deficit present.      Mental Status: She is alert and oriented to person, place, and time.   Psychiatric:         Mood and Affect: Mood normal.         Behavior: Behavior normal.         Judgment: Judgment normal.         No images are attached to the encounter.  Documentation entered by me for this encounter may have been done in part using speech-recognition technology. Although I have made an effort to ensure accuracy, "sound like" errors may exist and should be interpreted in context. -JAKE Merrill MD.  "

## 2020-11-13 NOTE — ASSESSMENT & PLAN NOTE
Lab Results   Component Value Date    CHOL 255 (H) 10/19/2020    TRIG 99 10/19/2020    HDL 66 10/19/2020    LDLCALC 169.2 (H) 10/19/2020    NONHDLCHOL 189 10/19/2020    AST 20 10/19/2020    ALT 21 10/19/2020     The 10-year ASCVD risk score (Renetta HANEY Jr., et al., 2013) is: 13.4%    Values used to calculate the score:      Age: 65 years      Sex: Female      Is Non- : Yes      Diabetic: No      Tobacco smoker: No      Systolic Blood Pressure: 140 mmHg      Is BP treated: Yes      HDL Cholesterol: 66 mg/dL      Total Cholesterol: 255 mg/dL

## 2020-11-13 NOTE — PATIENT INSTRUCTIONS
Learn about a heart-healthy lifestyle at the website of the American Heart Association, www.heart.org.

## 2020-11-22 NOTE — PROGRESS NOTES
"Lacey Fischer.    These results are OK and do not require a change in treatment right now. We can discuss your test results in detail at your next appointment.    Thanks for letting me care for you, thanks for trusting us with your healthcare needs, and thanks for using [facility].    Sincerely,    JAKE Merrill MD  --------------------------------------------------------------------------------   Want to learn more about your test results and what they mean?  (1) Log in to your [facility] account at [email or web address].  (2) From the "View test results" tab, click on the test you want to know more about.  (3) Click on the "About This Test" link."

## 2021-01-07 ENCOUNTER — PATIENT OUTREACH (OUTPATIENT)
Dept: ADMINISTRATIVE | Facility: HOSPITAL | Age: 66
End: 2021-01-07

## 2021-03-06 ENCOUNTER — IMMUNIZATION (OUTPATIENT)
Dept: PRIMARY CARE CLINIC | Facility: CLINIC | Age: 66
End: 2021-03-06

## 2021-03-06 DIAGNOSIS — Z23 NEED FOR VACCINATION: Primary | ICD-10-CM

## 2021-03-06 PROCEDURE — 0011A COVID-19, MRNA, LNP-S, PF, 100 MCG/0.5 ML DOSE VACCINE: CPT | Mod: CV19,S$GLB,, | Performed by: FAMILY MEDICINE

## 2021-03-06 PROCEDURE — 91301 COVID-19, MRNA, LNP-S, PF, 100 MCG/0.5 ML DOSE VACCINE: ICD-10-PCS | Mod: S$GLB,,, | Performed by: FAMILY MEDICINE

## 2021-03-06 PROCEDURE — 91301 COVID-19, MRNA, LNP-S, PF, 100 MCG/0.5 ML DOSE VACCINE: CPT | Mod: S$GLB,,, | Performed by: FAMILY MEDICINE

## 2021-03-06 PROCEDURE — 0011A COVID-19, MRNA, LNP-S, PF, 100 MCG/0.5 ML DOSE VACCINE: ICD-10-PCS | Mod: CV19,S$GLB,, | Performed by: FAMILY MEDICINE

## 2021-04-01 DIAGNOSIS — I10 ESSENTIAL HYPERTENSION: Primary | ICD-10-CM

## 2021-04-01 DIAGNOSIS — E78.00 PURE HYPERCHOLESTEROLEMIA: ICD-10-CM

## 2021-04-01 RX ORDER — CHLORTHALIDONE 25 MG/1
TABLET ORAL
Qty: 30 TABLET | Refills: 0 | Status: SHIPPED | OUTPATIENT
Start: 2021-04-01 | End: 2021-07-02

## 2021-04-03 ENCOUNTER — IMMUNIZATION (OUTPATIENT)
Dept: PRIMARY CARE CLINIC | Facility: CLINIC | Age: 66
End: 2021-04-03

## 2021-04-03 DIAGNOSIS — Z23 NEED FOR VACCINATION: Primary | ICD-10-CM

## 2021-04-03 PROCEDURE — 91301 COVID-19, MRNA, LNP-S, PF, 100 MCG/0.5 ML DOSE VACCINE: CPT | Mod: S$GLB,,, | Performed by: FAMILY MEDICINE

## 2021-04-03 PROCEDURE — 0012A COVID-19, MRNA, LNP-S, PF, 100 MCG/0.5 ML DOSE VACCINE: CPT | Mod: CV19,S$GLB,, | Performed by: FAMILY MEDICINE

## 2021-04-03 PROCEDURE — 91301 COVID-19, MRNA, LNP-S, PF, 100 MCG/0.5 ML DOSE VACCINE: ICD-10-PCS | Mod: S$GLB,,, | Performed by: FAMILY MEDICINE

## 2021-04-03 PROCEDURE — 0012A COVID-19, MRNA, LNP-S, PF, 100 MCG/0.5 ML DOSE VACCINE: ICD-10-PCS | Mod: CV19,S$GLB,, | Performed by: FAMILY MEDICINE

## 2021-04-16 ENCOUNTER — PATIENT OUTREACH (OUTPATIENT)
Dept: ADMINISTRATIVE | Facility: HOSPITAL | Age: 66
End: 2021-04-16

## 2021-05-27 ENCOUNTER — TELEPHONE (OUTPATIENT)
Dept: INTERNAL MEDICINE | Facility: CLINIC | Age: 66
End: 2021-05-27

## 2021-05-27 DIAGNOSIS — Z79.4 TYPE 2 DIABETES MELLITUS WITH HYPERGLYCEMIA, WITH LONG-TERM CURRENT USE OF INSULIN: Primary | ICD-10-CM

## 2021-05-27 DIAGNOSIS — E11.65 TYPE 2 DIABETES MELLITUS WITH HYPERGLYCEMIA, WITH LONG-TERM CURRENT USE OF INSULIN: Primary | ICD-10-CM

## 2021-05-27 RX ORDER — SYRINGE,SAFETY WITH NEEDLE,1ML 25GX1"
SYRINGE (EA) MISCELLANEOUS
Qty: 100 EACH | Refills: 0 | Status: SHIPPED | OUTPATIENT
Start: 2021-05-27 | End: 2021-05-28

## 2021-05-28 ENCOUNTER — TELEPHONE (OUTPATIENT)
Dept: INTERNAL MEDICINE | Facility: CLINIC | Age: 66
End: 2021-05-28

## 2021-05-28 DIAGNOSIS — Z79.4 TYPE 2 DIABETES MELLITUS WITH HYPERGLYCEMIA, WITH LONG-TERM CURRENT USE OF INSULIN: Primary | ICD-10-CM

## 2021-05-28 DIAGNOSIS — E11.65 TYPE 2 DIABETES MELLITUS WITH HYPERGLYCEMIA, WITH LONG-TERM CURRENT USE OF INSULIN: Primary | ICD-10-CM

## 2021-05-28 RX ORDER — NAPROXEN SODIUM 220 MG
TABLET ORAL
Qty: 100 EACH | Refills: 0 | Status: SHIPPED | OUTPATIENT
Start: 2021-05-28 | End: 2021-05-28 | Stop reason: SDUPTHER

## 2021-05-28 RX ORDER — NAPROXEN SODIUM 220 MG
TABLET ORAL
Qty: 100 EACH | Refills: 0 | Status: SHIPPED | OUTPATIENT
Start: 2021-05-28 | End: 2022-10-17

## 2021-06-03 ENCOUNTER — OFFICE VISIT (OUTPATIENT)
Dept: INTERNAL MEDICINE | Facility: CLINIC | Age: 66
End: 2021-06-03
Payer: MEDICARE

## 2021-06-03 VITALS
HEART RATE: 97 BPM | BODY MASS INDEX: 36.21 KG/M2 | OXYGEN SATURATION: 97 % | SYSTOLIC BLOOD PRESSURE: 124 MMHG | DIASTOLIC BLOOD PRESSURE: 80 MMHG | WEIGHT: 225.31 LBS | TEMPERATURE: 102 F | HEIGHT: 66 IN

## 2021-06-03 DIAGNOSIS — E78.00 PURE HYPERCHOLESTEROLEMIA: Chronic | ICD-10-CM

## 2021-06-03 DIAGNOSIS — E11.65 TYPE 2 DIABETES MELLITUS WITH HYPERGLYCEMIA, WITH LONG-TERM CURRENT USE OF INSULIN: Chronic | ICD-10-CM

## 2021-06-03 DIAGNOSIS — E66.01 CLASS 2 SEVERE OBESITY DUE TO EXCESS CALORIES WITH SERIOUS COMORBIDITY AND BODY MASS INDEX (BMI) OF 36.0 TO 36.9 IN ADULT: Chronic | ICD-10-CM

## 2021-06-03 DIAGNOSIS — R50.9 FEBRILE ILLNESS, ACUTE: Primary | ICD-10-CM

## 2021-06-03 DIAGNOSIS — Z79.4 TYPE 2 DIABETES MELLITUS WITH HYPERGLYCEMIA, WITH LONG-TERM CURRENT USE OF INSULIN: Chronic | ICD-10-CM

## 2021-06-03 DIAGNOSIS — I10 ESSENTIAL HYPERTENSION: ICD-10-CM

## 2021-06-03 DIAGNOSIS — I49.9 CARDIAC ARRHYTHMIA, UNSPECIFIED CARDIAC ARRHYTHMIA TYPE: ICD-10-CM

## 2021-06-03 PROBLEM — E66.812 CLASS 2 SEVERE OBESITY DUE TO EXCESS CALORIES WITH SERIOUS COMORBIDITY AND BODY MASS INDEX (BMI) OF 36.0 TO 36.9 IN ADULT: Chronic | Status: ACTIVE | Noted: 2020-10-19

## 2021-06-03 PROCEDURE — 3008F PR BODY MASS INDEX (BMI) DOCUMENTED: ICD-10-PCS | Mod: CPTII,S$GLB,, | Performed by: FAMILY MEDICINE

## 2021-06-03 PROCEDURE — 3008F BODY MASS INDEX DOCD: CPT | Mod: CPTII,S$GLB,, | Performed by: FAMILY MEDICINE

## 2021-06-03 PROCEDURE — 1159F PR MEDICATION LIST DOCUMENTED IN MEDICAL RECORD: ICD-10-PCS | Mod: S$GLB,,, | Performed by: FAMILY MEDICINE

## 2021-06-03 PROCEDURE — 99214 OFFICE O/P EST MOD 30 MIN: CPT | Mod: S$GLB,,, | Performed by: FAMILY MEDICINE

## 2021-06-03 PROCEDURE — 1159F MED LIST DOCD IN RCRD: CPT | Mod: S$GLB,,, | Performed by: FAMILY MEDICINE

## 2021-06-03 PROCEDURE — 99499 RISK ADDL DX/OHS AUDIT: ICD-10-PCS | Mod: S$GLB,,, | Performed by: FAMILY MEDICINE

## 2021-06-03 PROCEDURE — 99999 PR PBB SHADOW E&M-EST. PATIENT-LVL III: ICD-10-PCS | Mod: PBBFAC,,, | Performed by: FAMILY MEDICINE

## 2021-06-03 PROCEDURE — 99999 PR PBB SHADOW E&M-EST. PATIENT-LVL III: CPT | Mod: PBBFAC,,, | Performed by: FAMILY MEDICINE

## 2021-06-03 PROCEDURE — 99499 UNLISTED E&M SERVICE: CPT | Mod: S$GLB,,, | Performed by: FAMILY MEDICINE

## 2021-06-03 PROCEDURE — 99214 PR OFFICE/OUTPT VISIT, EST, LEVL IV, 30-39 MIN: ICD-10-PCS | Mod: S$GLB,,, | Performed by: FAMILY MEDICINE

## 2021-06-14 ENCOUNTER — OFFICE VISIT (OUTPATIENT)
Dept: OPHTHALMOLOGY | Facility: CLINIC | Age: 66
End: 2021-06-14
Payer: MEDICARE

## 2021-06-14 DIAGNOSIS — Z83.511 FAMILY HISTORY OF GLAUCOMA: ICD-10-CM

## 2021-06-14 DIAGNOSIS — H40.1131 PRIMARY OPEN ANGLE GLAUCOMA (POAG) OF BOTH EYES, MILD STAGE: ICD-10-CM

## 2021-06-14 PROCEDURE — 1159F PR MEDICATION LIST DOCUMENTED IN MEDICAL RECORD: ICD-10-PCS | Mod: S$GLB,,, | Performed by: OPHTHALMOLOGY

## 2021-06-14 PROCEDURE — 99999 PR PBB SHADOW E&M-EST. PATIENT-LVL II: ICD-10-PCS | Mod: PBBFAC,,, | Performed by: OPHTHALMOLOGY

## 2021-06-14 PROCEDURE — 99202 OFFICE O/P NEW SF 15 MIN: CPT | Mod: S$GLB,,, | Performed by: OPHTHALMOLOGY

## 2021-06-14 PROCEDURE — 99202 PR OFFICE/OUTPT VISIT, NEW, LEVL II, 15-29 MIN: ICD-10-PCS | Mod: S$GLB,,, | Performed by: OPHTHALMOLOGY

## 2021-06-14 PROCEDURE — 1159F MED LIST DOCD IN RCRD: CPT | Mod: S$GLB,,, | Performed by: OPHTHALMOLOGY

## 2021-06-14 PROCEDURE — 99999 PR PBB SHADOW E&M-EST. PATIENT-LVL II: CPT | Mod: PBBFAC,,, | Performed by: OPHTHALMOLOGY

## 2021-06-14 RX ORDER — METOPROLOL SUCCINATE 25 MG/1
25 TABLET, EXTENDED RELEASE ORAL
COMMUNITY
Start: 2021-06-05 | End: 2022-02-02 | Stop reason: SDUPTHER

## 2021-06-14 RX ORDER — INSULIN GLARGINE 100 [IU]/ML
30 INJECTION, SOLUTION SUBCUTANEOUS
COMMUNITY
Start: 2021-06-04 | End: 2021-12-01

## 2021-06-14 RX ORDER — CONTAINER,EMPTY
EACH MISCELLANEOUS
COMMUNITY
Start: 2021-05-25

## 2021-06-14 RX ORDER — INSULIN LISPRO 100 [IU]/ML
INJECTION, SOLUTION INTRAVENOUS; SUBCUTANEOUS
COMMUNITY
Start: 2021-05-25 | End: 2022-02-02 | Stop reason: SDUPTHER

## 2021-06-14 RX ORDER — LISINOPRIL 40 MG/1
TABLET ORAL
COMMUNITY
Start: 2021-05-25 | End: 2021-07-02 | Stop reason: ALTCHOICE

## 2021-06-24 ENCOUNTER — DOCUMENT SCAN (OUTPATIENT)
Dept: HOME HEALTH SERVICES | Facility: HOSPITAL | Age: 66
End: 2021-06-24
Payer: MEDICARE

## 2021-06-24 ENCOUNTER — TELEPHONE (OUTPATIENT)
Dept: INTERNAL MEDICINE | Facility: CLINIC | Age: 66
End: 2021-06-24

## 2021-06-25 DIAGNOSIS — E11.65 TYPE 2 DIABETES MELLITUS WITH HYPERGLYCEMIA, WITH LONG-TERM CURRENT USE OF INSULIN: Primary | ICD-10-CM

## 2021-06-25 DIAGNOSIS — Z79.4 TYPE 2 DIABETES MELLITUS WITH HYPERGLYCEMIA, WITH LONG-TERM CURRENT USE OF INSULIN: Primary | ICD-10-CM

## 2021-07-01 ENCOUNTER — PATIENT MESSAGE (OUTPATIENT)
Dept: ADMINISTRATIVE | Facility: OTHER | Age: 66
End: 2021-07-01

## 2021-07-02 ENCOUNTER — OFFICE VISIT (OUTPATIENT)
Dept: INTERNAL MEDICINE | Facility: CLINIC | Age: 66
End: 2021-07-02
Payer: MEDICARE

## 2021-07-02 VITALS
HEIGHT: 66 IN | OXYGEN SATURATION: 98 % | HEART RATE: 100 BPM | BODY MASS INDEX: 35.65 KG/M2 | WEIGHT: 221.81 LBS | TEMPERATURE: 99 F | DIASTOLIC BLOOD PRESSURE: 94 MMHG | SYSTOLIC BLOOD PRESSURE: 152 MMHG

## 2021-07-02 DIAGNOSIS — I10 ESSENTIAL HYPERTENSION: Primary | ICD-10-CM

## 2021-07-02 DIAGNOSIS — R50.9 FEBRILE ILLNESS, ACUTE: ICD-10-CM

## 2021-07-02 DIAGNOSIS — I48.0 PAROXYSMAL ATRIAL FIBRILLATION: ICD-10-CM

## 2021-07-02 DIAGNOSIS — E78.00 PURE HYPERCHOLESTEROLEMIA: ICD-10-CM

## 2021-07-02 DIAGNOSIS — E11.65 TYPE 2 DIABETES MELLITUS WITH HYPERGLYCEMIA, WITH LONG-TERM CURRENT USE OF INSULIN: Chronic | ICD-10-CM

## 2021-07-02 DIAGNOSIS — Z79.4 TYPE 2 DIABETES MELLITUS WITH HYPERGLYCEMIA, WITH LONG-TERM CURRENT USE OF INSULIN: Chronic | ICD-10-CM

## 2021-07-02 PROCEDURE — 3080F PR MOST RECENT DIASTOLIC BLOOD PRESSURE >= 90 MM HG: ICD-10-PCS | Mod: CPTII,S$GLB,, | Performed by: FAMILY MEDICINE

## 2021-07-02 PROCEDURE — 1126F PR PAIN SEVERITY QUANTIFIED, NO PAIN PRESENT: ICD-10-PCS | Mod: S$GLB,,, | Performed by: FAMILY MEDICINE

## 2021-07-02 PROCEDURE — 99214 OFFICE O/P EST MOD 30 MIN: CPT | Mod: S$GLB,,, | Performed by: FAMILY MEDICINE

## 2021-07-02 PROCEDURE — 3077F SYST BP >= 140 MM HG: CPT | Mod: CPTII,S$GLB,, | Performed by: FAMILY MEDICINE

## 2021-07-02 PROCEDURE — 1126F AMNT PAIN NOTED NONE PRSNT: CPT | Mod: S$GLB,,, | Performed by: FAMILY MEDICINE

## 2021-07-02 PROCEDURE — 99214 PR OFFICE/OUTPT VISIT, EST, LEVL IV, 30-39 MIN: ICD-10-PCS | Mod: S$GLB,,, | Performed by: FAMILY MEDICINE

## 2021-07-02 PROCEDURE — 99999 PR PBB SHADOW E&M-EST. PATIENT-LVL IV: CPT | Mod: PBBFAC,,, | Performed by: FAMILY MEDICINE

## 2021-07-02 PROCEDURE — 99499 UNLISTED E&M SERVICE: CPT | Mod: S$GLB,,, | Performed by: FAMILY MEDICINE

## 2021-07-02 PROCEDURE — 99999 PR PBB SHADOW E&M-EST. PATIENT-LVL IV: ICD-10-PCS | Mod: PBBFAC,,, | Performed by: FAMILY MEDICINE

## 2021-07-02 PROCEDURE — 1100F PTFALLS ASSESS-DOCD GE2>/YR: CPT | Mod: CPTII,S$GLB,, | Performed by: FAMILY MEDICINE

## 2021-07-02 PROCEDURE — 3008F BODY MASS INDEX DOCD: CPT | Mod: CPTII,S$GLB,, | Performed by: FAMILY MEDICINE

## 2021-07-02 PROCEDURE — 1159F PR MEDICATION LIST DOCUMENTED IN MEDICAL RECORD: ICD-10-PCS | Mod: S$GLB,,, | Performed by: FAMILY MEDICINE

## 2021-07-02 PROCEDURE — 1100F PR PT FALLS ASSESS DOC 2+ FALLS/FALL W/INJURY/YR: ICD-10-PCS | Mod: CPTII,S$GLB,, | Performed by: FAMILY MEDICINE

## 2021-07-02 PROCEDURE — 3008F PR BODY MASS INDEX (BMI) DOCUMENTED: ICD-10-PCS | Mod: CPTII,S$GLB,, | Performed by: FAMILY MEDICINE

## 2021-07-02 PROCEDURE — 3077F PR MOST RECENT SYSTOLIC BLOOD PRESSURE >= 140 MM HG: ICD-10-PCS | Mod: CPTII,S$GLB,, | Performed by: FAMILY MEDICINE

## 2021-07-02 PROCEDURE — 3080F DIAST BP >= 90 MM HG: CPT | Mod: CPTII,S$GLB,, | Performed by: FAMILY MEDICINE

## 2021-07-02 PROCEDURE — 3288F FALL RISK ASSESSMENT DOCD: CPT | Mod: CPTII,S$GLB,, | Performed by: FAMILY MEDICINE

## 2021-07-02 PROCEDURE — 99499 RISK ADDL DX/OHS AUDIT: ICD-10-PCS | Mod: S$GLB,,, | Performed by: FAMILY MEDICINE

## 2021-07-02 PROCEDURE — 1159F MED LIST DOCD IN RCRD: CPT | Mod: S$GLB,,, | Performed by: FAMILY MEDICINE

## 2021-07-02 PROCEDURE — 3288F PR FALLS RISK ASSESSMENT DOCUMENTED: ICD-10-PCS | Mod: CPTII,S$GLB,, | Performed by: FAMILY MEDICINE

## 2021-07-02 RX ORDER — METFORMIN HYDROCHLORIDE 500 MG/1
500 TABLET, EXTENDED RELEASE ORAL
Qty: 90 TABLET | Refills: 0 | Status: SHIPPED | OUTPATIENT
Start: 2021-07-02 | End: 2021-09-13

## 2021-07-02 RX ORDER — LANCETS
EACH MISCELLANEOUS
Qty: 200 EACH | Refills: 11 | Status: SHIPPED | OUTPATIENT
Start: 2021-07-02

## 2021-07-02 RX ORDER — POTASSIUM CHLORIDE 750 MG/1
10 TABLET, EXTENDED RELEASE ORAL DAILY
Qty: 90 TABLET | Refills: 0 | Status: SHIPPED | OUTPATIENT
Start: 2021-07-02 | End: 2021-10-18 | Stop reason: SDUPTHER

## 2021-07-02 RX ORDER — ATORVASTATIN CALCIUM 20 MG/1
20 TABLET, FILM COATED ORAL DAILY
Qty: 90 TABLET | Refills: 3 | Status: SHIPPED | OUTPATIENT
Start: 2021-07-02 | End: 2022-02-02 | Stop reason: ALTCHOICE

## 2021-07-02 RX ORDER — INSULIN PUMP SYRINGE, 3 ML
EACH MISCELLANEOUS
Qty: 1 EACH | Refills: 0 | Status: SHIPPED | OUTPATIENT
Start: 2021-07-02

## 2021-07-02 RX ORDER — LOSARTAN POTASSIUM AND HYDROCHLOROTHIAZIDE 25; 100 MG/1; MG/1
1 TABLET ORAL DAILY
Qty: 90 TABLET | Refills: 3 | Status: SHIPPED | OUTPATIENT
Start: 2021-07-02 | End: 2022-02-02 | Stop reason: SDUPTHER

## 2021-07-08 ENCOUNTER — TELEPHONE (OUTPATIENT)
Dept: ADMINISTRATIVE | Facility: HOSPITAL | Age: 66
End: 2021-07-08

## 2021-07-14 ENCOUNTER — PATIENT OUTREACH (OUTPATIENT)
Dept: ADMINISTRATIVE | Facility: OTHER | Age: 66
End: 2021-07-14

## 2021-07-14 DIAGNOSIS — I48.91 ATRIAL FIBRILLATION, UNSPECIFIED TYPE: Primary | ICD-10-CM

## 2021-07-15 ENCOUNTER — HOSPITAL ENCOUNTER (OUTPATIENT)
Dept: CARDIOLOGY | Facility: HOSPITAL | Age: 66
Discharge: HOME | End: 2021-07-15
Attending: INTERNAL MEDICINE
Payer: MEDICARE

## 2021-07-15 ENCOUNTER — OFFICE VISIT (OUTPATIENT)
Dept: CARDIOLOGY | Facility: CLINIC | Age: 66
End: 2021-07-15
Payer: MEDICARE

## 2021-07-15 VITALS
BODY MASS INDEX: 35.65 KG/M2 | SYSTOLIC BLOOD PRESSURE: 130 MMHG | WEIGHT: 220.88 LBS | DIASTOLIC BLOOD PRESSURE: 84 MMHG | OXYGEN SATURATION: 98 % | HEART RATE: 88 BPM

## 2021-07-15 DIAGNOSIS — I49.9 IRREGULAR HEART RHYTHM: ICD-10-CM

## 2021-07-15 DIAGNOSIS — I48.0 PAROXYSMAL ATRIAL FIBRILLATION: ICD-10-CM

## 2021-07-15 DIAGNOSIS — I10 ESSENTIAL HYPERTENSION: Primary | ICD-10-CM

## 2021-07-15 DIAGNOSIS — I48.91 ATRIAL FIBRILLATION, UNSPECIFIED TYPE: ICD-10-CM

## 2021-07-15 PROCEDURE — 99204 OFFICE O/P NEW MOD 45 MIN: CPT | Mod: S$GLB,,, | Performed by: INTERNAL MEDICINE

## 2021-07-15 PROCEDURE — 99999 PR PBB SHADOW E&M-EST. PATIENT-LVL IV: ICD-10-PCS | Mod: PBBFAC,,, | Performed by: INTERNAL MEDICINE

## 2021-07-15 PROCEDURE — 3079F PR MOST RECENT DIASTOLIC BLOOD PRESSURE 80-89 MM HG: ICD-10-PCS | Mod: CPTII,S$GLB,, | Performed by: INTERNAL MEDICINE

## 2021-07-15 PROCEDURE — 93010 ELECTROCARDIOGRAM REPORT: CPT | Mod: ,,, | Performed by: INTERNAL MEDICINE

## 2021-07-15 PROCEDURE — 99204 PR OFFICE/OUTPT VISIT, NEW, LEVL IV, 45-59 MIN: ICD-10-PCS | Mod: S$GLB,,, | Performed by: INTERNAL MEDICINE

## 2021-07-15 PROCEDURE — 1159F MED LIST DOCD IN RCRD: CPT | Mod: S$GLB,,, | Performed by: INTERNAL MEDICINE

## 2021-07-15 PROCEDURE — 3079F DIAST BP 80-89 MM HG: CPT | Mod: CPTII,S$GLB,, | Performed by: INTERNAL MEDICINE

## 2021-07-15 PROCEDURE — 3075F PR MOST RECENT SYSTOLIC BLOOD PRESS GE 130-139MM HG: ICD-10-PCS | Mod: CPTII,S$GLB,, | Performed by: INTERNAL MEDICINE

## 2021-07-15 PROCEDURE — 93010 EKG 12-LEAD: ICD-10-PCS | Mod: ,,, | Performed by: INTERNAL MEDICINE

## 2021-07-15 PROCEDURE — 99999 PR PBB SHADOW E&M-EST. PATIENT-LVL IV: CPT | Mod: PBBFAC,,, | Performed by: INTERNAL MEDICINE

## 2021-07-15 PROCEDURE — 1159F PR MEDICATION LIST DOCUMENTED IN MEDICAL RECORD: ICD-10-PCS | Mod: S$GLB,,, | Performed by: INTERNAL MEDICINE

## 2021-07-15 PROCEDURE — 3008F BODY MASS INDEX DOCD: CPT | Mod: CPTII,S$GLB,, | Performed by: INTERNAL MEDICINE

## 2021-07-15 PROCEDURE — 3075F SYST BP GE 130 - 139MM HG: CPT | Mod: CPTII,S$GLB,, | Performed by: INTERNAL MEDICINE

## 2021-07-15 PROCEDURE — 3008F PR BODY MASS INDEX (BMI) DOCUMENTED: ICD-10-PCS | Mod: CPTII,S$GLB,, | Performed by: INTERNAL MEDICINE

## 2021-07-15 PROCEDURE — 93005 ELECTROCARDIOGRAM TRACING: CPT

## 2021-08-03 ENCOUNTER — OFFICE VISIT (OUTPATIENT)
Dept: OPHTHALMOLOGY | Facility: CLINIC | Age: 66
End: 2021-08-03
Payer: MEDICARE

## 2021-08-03 DIAGNOSIS — H40.003 GLAUCOMA SUSPECT OF BOTH EYES: ICD-10-CM

## 2021-08-03 PROCEDURE — 1159F MED LIST DOCD IN RCRD: CPT | Mod: CPTII,S$GLB,, | Performed by: OPHTHALMOLOGY

## 2021-08-03 PROCEDURE — 92083 HUMPHREY VISUAL FIELD - OU - BOTH EYES: ICD-10-PCS | Mod: S$GLB,,, | Performed by: OPHTHALMOLOGY

## 2021-08-03 PROCEDURE — 92012 PR EYE EXAM, EST PATIENT,INTERMED: ICD-10-PCS | Mod: S$GLB,,, | Performed by: OPHTHALMOLOGY

## 2021-08-03 PROCEDURE — 92012 INTRM OPH EXAM EST PATIENT: CPT | Mod: S$GLB,,, | Performed by: OPHTHALMOLOGY

## 2021-08-03 PROCEDURE — 1126F PR PAIN SEVERITY QUANTIFIED, NO PAIN PRESENT: ICD-10-PCS | Mod: CPTII,S$GLB,, | Performed by: OPHTHALMOLOGY

## 2021-08-03 PROCEDURE — 1160F PR REVIEW ALL MEDS BY PRESCRIBER/CLIN PHARMACIST DOCUMENTED: ICD-10-PCS | Mod: CPTII,S$GLB,, | Performed by: OPHTHALMOLOGY

## 2021-08-03 PROCEDURE — 1160F RVW MEDS BY RX/DR IN RCRD: CPT | Mod: CPTII,S$GLB,, | Performed by: OPHTHALMOLOGY

## 2021-08-03 PROCEDURE — 1159F PR MEDICATION LIST DOCUMENTED IN MEDICAL RECORD: ICD-10-PCS | Mod: CPTII,S$GLB,, | Performed by: OPHTHALMOLOGY

## 2021-08-03 PROCEDURE — 92083 EXTENDED VISUAL FIELD XM: CPT | Mod: S$GLB,,, | Performed by: OPHTHALMOLOGY

## 2021-08-03 PROCEDURE — 99999 PR PBB SHADOW E&M-EST. PATIENT-LVL III: ICD-10-PCS | Mod: PBBFAC,,, | Performed by: OPHTHALMOLOGY

## 2021-08-03 PROCEDURE — 1126F AMNT PAIN NOTED NONE PRSNT: CPT | Mod: CPTII,S$GLB,, | Performed by: OPHTHALMOLOGY

## 2021-08-03 PROCEDURE — 99999 PR PBB SHADOW E&M-EST. PATIENT-LVL III: CPT | Mod: PBBFAC,,, | Performed by: OPHTHALMOLOGY

## 2021-08-03 RX ORDER — LISINOPRIL 40 MG/1
20 TABLET ORAL
COMMUNITY
Start: 2021-05-25 | End: 2021-10-18 | Stop reason: ALTCHOICE

## 2021-08-03 RX ORDER — INSULIN DETEMIR 100 [IU]/ML
INJECTION, SOLUTION SUBCUTANEOUS
COMMUNITY
Start: 2021-05-25 | End: 2022-02-02

## 2021-08-03 RX ORDER — PEN NEEDLE, DIABETIC 32GX 5/32"
NEEDLE, DISPOSABLE MISCELLANEOUS
COMMUNITY
Start: 2021-05-25 | End: 2022-02-02 | Stop reason: SDUPTHER

## 2021-08-25 ENCOUNTER — TELEPHONE (OUTPATIENT)
Dept: ADMINISTRATIVE | Facility: HOSPITAL | Age: 66
End: 2021-08-25

## 2021-10-04 ENCOUNTER — PATIENT OUTREACH (OUTPATIENT)
Dept: ADMINISTRATIVE | Facility: OTHER | Age: 66
End: 2021-10-04

## 2021-10-18 ENCOUNTER — LAB VISIT (OUTPATIENT)
Dept: LAB | Facility: HOSPITAL | Age: 66
End: 2021-10-18
Attending: INTERNAL MEDICINE
Payer: MEDICARE

## 2021-10-18 ENCOUNTER — OFFICE VISIT (OUTPATIENT)
Dept: CARDIOLOGY | Facility: CLINIC | Age: 66
End: 2021-10-18
Payer: MEDICARE

## 2021-10-18 VITALS
OXYGEN SATURATION: 98 % | WEIGHT: 226.88 LBS | DIASTOLIC BLOOD PRESSURE: 110 MMHG | HEART RATE: 82 BPM | HEIGHT: 66 IN | SYSTOLIC BLOOD PRESSURE: 170 MMHG | BODY MASS INDEX: 36.46 KG/M2

## 2021-10-18 DIAGNOSIS — I48.91 ATRIAL FIBRILLATION, UNSPECIFIED TYPE: ICD-10-CM

## 2021-10-18 DIAGNOSIS — I10 ESSENTIAL HYPERTENSION: Primary | ICD-10-CM

## 2021-10-18 DIAGNOSIS — I49.9 IRREGULAR HEART RHYTHM: ICD-10-CM

## 2021-10-18 DIAGNOSIS — I48.0 PAROXYSMAL ATRIAL FIBRILLATION: ICD-10-CM

## 2021-10-18 DIAGNOSIS — E87.6 LOW POTASSIUM SYNDROME: ICD-10-CM

## 2021-10-18 DIAGNOSIS — I10 ESSENTIAL HYPERTENSION: ICD-10-CM

## 2021-10-18 LAB
ANION GAP SERPL CALC-SCNC: 11 MMOL/L (ref 8–16)
BUN SERPL-MCNC: 18 MG/DL (ref 8–23)
CALCIUM SERPL-MCNC: 10.4 MG/DL (ref 8.7–10.5)
CHLORIDE SERPL-SCNC: 103 MMOL/L (ref 95–110)
CO2 SERPL-SCNC: 25 MMOL/L (ref 23–29)
CREAT SERPL-MCNC: 0.8 MG/DL (ref 0.5–1.4)
EST. GFR  (AFRICAN AMERICAN): >60 ML/MIN/1.73 M^2
EST. GFR  (NON AFRICAN AMERICAN): >60 ML/MIN/1.73 M^2
GLUCOSE SERPL-MCNC: 98 MG/DL (ref 70–110)
POTASSIUM SERPL-SCNC: 3.8 MMOL/L (ref 3.5–5.1)
SODIUM SERPL-SCNC: 139 MMOL/L (ref 136–145)

## 2021-10-18 PROCEDURE — 99499 UNLISTED E&M SERVICE: CPT | Mod: S$GLB,,, | Performed by: INTERNAL MEDICINE

## 2021-10-18 PROCEDURE — 99214 PR OFFICE/OUTPT VISIT, EST, LEVL IV, 30-39 MIN: ICD-10-PCS | Mod: S$GLB,,, | Performed by: INTERNAL MEDICINE

## 2021-10-18 PROCEDURE — 3077F SYST BP >= 140 MM HG: CPT | Mod: CPTII,S$GLB,, | Performed by: INTERNAL MEDICINE

## 2021-10-18 PROCEDURE — 36415 COLL VENOUS BLD VENIPUNCTURE: CPT | Performed by: INTERNAL MEDICINE

## 2021-10-18 PROCEDURE — 3008F PR BODY MASS INDEX (BMI) DOCUMENTED: ICD-10-PCS | Mod: CPTII,S$GLB,, | Performed by: INTERNAL MEDICINE

## 2021-10-18 PROCEDURE — 1125F PR PAIN SEVERITY QUANTIFIED, PAIN PRESENT: ICD-10-PCS | Mod: CPTII,S$GLB,, | Performed by: INTERNAL MEDICINE

## 2021-10-18 PROCEDURE — 1125F AMNT PAIN NOTED PAIN PRSNT: CPT | Mod: CPTII,S$GLB,, | Performed by: INTERNAL MEDICINE

## 2021-10-18 PROCEDURE — 3288F PR FALLS RISK ASSESSMENT DOCUMENTED: ICD-10-PCS | Mod: CPTII,S$GLB,, | Performed by: INTERNAL MEDICINE

## 2021-10-18 PROCEDURE — 1159F PR MEDICATION LIST DOCUMENTED IN MEDICAL RECORD: ICD-10-PCS | Mod: CPTII,S$GLB,, | Performed by: INTERNAL MEDICINE

## 2021-10-18 PROCEDURE — 1160F RVW MEDS BY RX/DR IN RCRD: CPT | Mod: CPTII,S$GLB,, | Performed by: INTERNAL MEDICINE

## 2021-10-18 PROCEDURE — 99214 OFFICE O/P EST MOD 30 MIN: CPT | Mod: S$GLB,,, | Performed by: INTERNAL MEDICINE

## 2021-10-18 PROCEDURE — 80048 BASIC METABOLIC PNL TOTAL CA: CPT | Performed by: INTERNAL MEDICINE

## 2021-10-18 PROCEDURE — 99999 PR PBB SHADOW E&M-EST. PATIENT-LVL IV: ICD-10-PCS | Mod: PBBFAC,,, | Performed by: INTERNAL MEDICINE

## 2021-10-18 PROCEDURE — 3077F PR MOST RECENT SYSTOLIC BLOOD PRESSURE >= 140 MM HG: ICD-10-PCS | Mod: CPTII,S$GLB,, | Performed by: INTERNAL MEDICINE

## 2021-10-18 PROCEDURE — 1160F PR REVIEW ALL MEDS BY PRESCRIBER/CLIN PHARMACIST DOCUMENTED: ICD-10-PCS | Mod: CPTII,S$GLB,, | Performed by: INTERNAL MEDICINE

## 2021-10-18 PROCEDURE — 99999 PR PBB SHADOW E&M-EST. PATIENT-LVL IV: CPT | Mod: PBBFAC,,, | Performed by: INTERNAL MEDICINE

## 2021-10-18 PROCEDURE — 4010F PR ACE/ARB THEARPY RXD/TAKEN: ICD-10-PCS | Mod: CPTII,S$GLB,, | Performed by: INTERNAL MEDICINE

## 2021-10-18 PROCEDURE — 3080F PR MOST RECENT DIASTOLIC BLOOD PRESSURE >= 90 MM HG: ICD-10-PCS | Mod: CPTII,S$GLB,, | Performed by: INTERNAL MEDICINE

## 2021-10-18 PROCEDURE — 3008F BODY MASS INDEX DOCD: CPT | Mod: CPTII,S$GLB,, | Performed by: INTERNAL MEDICINE

## 2021-10-18 PROCEDURE — 1101F PT FALLS ASSESS-DOCD LE1/YR: CPT | Mod: CPTII,S$GLB,, | Performed by: INTERNAL MEDICINE

## 2021-10-18 PROCEDURE — 4010F ACE/ARB THERAPY RXD/TAKEN: CPT | Mod: CPTII,S$GLB,, | Performed by: INTERNAL MEDICINE

## 2021-10-18 PROCEDURE — 3080F DIAST BP >= 90 MM HG: CPT | Mod: CPTII,S$GLB,, | Performed by: INTERNAL MEDICINE

## 2021-10-18 PROCEDURE — 1159F MED LIST DOCD IN RCRD: CPT | Mod: CPTII,S$GLB,, | Performed by: INTERNAL MEDICINE

## 2021-10-18 PROCEDURE — 99499 RISK ADDL DX/OHS AUDIT: ICD-10-PCS | Mod: S$GLB,,, | Performed by: INTERNAL MEDICINE

## 2021-10-18 PROCEDURE — 3288F FALL RISK ASSESSMENT DOCD: CPT | Mod: CPTII,S$GLB,, | Performed by: INTERNAL MEDICINE

## 2021-10-18 PROCEDURE — 1101F PR PT FALLS ASSESS DOC 0-1 FALLS W/OUT INJ PAST YR: ICD-10-PCS | Mod: CPTII,S$GLB,, | Performed by: INTERNAL MEDICINE

## 2021-10-18 RX ORDER — AMLODIPINE BESYLATE 5 MG/1
5 TABLET ORAL DAILY
Qty: 30 TABLET | Refills: 11 | Status: SHIPPED | OUTPATIENT
Start: 2021-10-18 | End: 2022-02-02 | Stop reason: SDUPTHER

## 2021-10-18 RX ORDER — POTASSIUM CHLORIDE 750 MG/1
10 TABLET, EXTENDED RELEASE ORAL DAILY
Qty: 90 TABLET | Refills: 1 | Status: SHIPPED | OUTPATIENT
Start: 2021-10-18 | End: 2022-02-02 | Stop reason: SDUPTHER

## 2021-10-19 ENCOUNTER — TELEPHONE (OUTPATIENT)
Dept: CARDIOLOGY | Facility: CLINIC | Age: 66
End: 2021-10-19

## 2021-10-27 ENCOUNTER — OFFICE VISIT (OUTPATIENT)
Dept: CARDIOLOGY | Facility: CLINIC | Age: 66
End: 2021-10-27
Payer: MEDICARE

## 2021-10-27 VITALS
WEIGHT: 228.19 LBS | BODY MASS INDEX: 36.83 KG/M2 | DIASTOLIC BLOOD PRESSURE: 74 MMHG | SYSTOLIC BLOOD PRESSURE: 126 MMHG | HEART RATE: 74 BPM | OXYGEN SATURATION: 99 %

## 2021-10-27 DIAGNOSIS — I49.9 IRREGULAR HEART RHYTHM: ICD-10-CM

## 2021-10-27 DIAGNOSIS — I10 ESSENTIAL HYPERTENSION: ICD-10-CM

## 2021-10-27 DIAGNOSIS — I48.0 PAROXYSMAL ATRIAL FIBRILLATION: ICD-10-CM

## 2021-10-27 DIAGNOSIS — I48.91 ATRIAL FIBRILLATION, UNSPECIFIED TYPE: Primary | ICD-10-CM

## 2021-10-27 DIAGNOSIS — E87.6 LOW POTASSIUM SYNDROME: ICD-10-CM

## 2021-10-27 PROCEDURE — 99214 PR OFFICE/OUTPT VISIT, EST, LEVL IV, 30-39 MIN: ICD-10-PCS | Mod: S$GLB,,, | Performed by: INTERNAL MEDICINE

## 2021-10-27 PROCEDURE — 99999 PR PBB SHADOW E&M-EST. PATIENT-LVL III: CPT | Mod: PBBFAC,,, | Performed by: INTERNAL MEDICINE

## 2021-10-27 PROCEDURE — 4010F PR ACE/ARB THEARPY RXD/TAKEN: ICD-10-PCS | Mod: CPTII,S$GLB,, | Performed by: INTERNAL MEDICINE

## 2021-10-27 PROCEDURE — 4010F ACE/ARB THERAPY RXD/TAKEN: CPT | Mod: CPTII,S$GLB,, | Performed by: INTERNAL MEDICINE

## 2021-10-27 PROCEDURE — 3078F PR MOST RECENT DIASTOLIC BLOOD PRESSURE < 80 MM HG: ICD-10-PCS | Mod: CPTII,S$GLB,, | Performed by: INTERNAL MEDICINE

## 2021-10-27 PROCEDURE — 3008F PR BODY MASS INDEX (BMI) DOCUMENTED: ICD-10-PCS | Mod: CPTII,S$GLB,, | Performed by: INTERNAL MEDICINE

## 2021-10-27 PROCEDURE — 99214 OFFICE O/P EST MOD 30 MIN: CPT | Mod: S$GLB,,, | Performed by: INTERNAL MEDICINE

## 2021-10-27 PROCEDURE — 1159F MED LIST DOCD IN RCRD: CPT | Mod: CPTII,S$GLB,, | Performed by: INTERNAL MEDICINE

## 2021-10-27 PROCEDURE — 3078F DIAST BP <80 MM HG: CPT | Mod: CPTII,S$GLB,, | Performed by: INTERNAL MEDICINE

## 2021-10-27 PROCEDURE — 99999 PR PBB SHADOW E&M-EST. PATIENT-LVL III: ICD-10-PCS | Mod: PBBFAC,,, | Performed by: INTERNAL MEDICINE

## 2021-10-27 PROCEDURE — 3008F BODY MASS INDEX DOCD: CPT | Mod: CPTII,S$GLB,, | Performed by: INTERNAL MEDICINE

## 2021-10-27 PROCEDURE — 3074F PR MOST RECENT SYSTOLIC BLOOD PRESSURE < 130 MM HG: ICD-10-PCS | Mod: CPTII,S$GLB,, | Performed by: INTERNAL MEDICINE

## 2021-10-27 PROCEDURE — 1159F PR MEDICATION LIST DOCUMENTED IN MEDICAL RECORD: ICD-10-PCS | Mod: CPTII,S$GLB,, | Performed by: INTERNAL MEDICINE

## 2021-10-27 PROCEDURE — 3074F SYST BP LT 130 MM HG: CPT | Mod: CPTII,S$GLB,, | Performed by: INTERNAL MEDICINE

## 2021-11-18 ENCOUNTER — IMMUNIZATION (OUTPATIENT)
Dept: PHARMACY | Facility: CLINIC | Age: 66
End: 2021-11-18
Payer: MEDICARE

## 2021-11-22 ENCOUNTER — PES CALL (OUTPATIENT)
Dept: ADMINISTRATIVE | Facility: CLINIC | Age: 66
End: 2021-11-22
Payer: MEDICARE

## 2021-12-08 DIAGNOSIS — E11.9 TYPE 2 DIABETES MELLITUS WITHOUT COMPLICATION: ICD-10-CM

## 2021-12-30 ENCOUNTER — PATIENT OUTREACH (OUTPATIENT)
Dept: ADMINISTRATIVE | Facility: OTHER | Age: 66
End: 2021-12-30
Payer: MEDICARE

## 2021-12-30 DIAGNOSIS — Z12.31 ENCOUNTER FOR SCREENING MAMMOGRAM FOR BREAST CANCER: Primary | ICD-10-CM

## 2022-01-08 ENCOUNTER — TELEPHONE (OUTPATIENT)
Dept: OPHTHALMOLOGY | Facility: CLINIC | Age: 67
End: 2022-01-08
Payer: MEDICARE

## 2022-01-08 NOTE — TELEPHONE ENCOUNTER
A message was left on patient's voicemail letting them know we are canceling the appointment scheduled Monday Buster 10 with Dr. Zamora as Dr. Zamora has had an emergency and will be out of the office.  His techs will call Monday to reschedule.

## 2022-01-11 ENCOUNTER — PATIENT OUTREACH (OUTPATIENT)
Dept: ADMINISTRATIVE | Facility: HOSPITAL | Age: 67
End: 2022-01-11
Payer: MEDICARE

## 2022-01-11 NOTE — PROGRESS NOTES
Health Maintenance Due   Topic Date Due    Diabetes Urine Screening  Never done    TETANUS VACCINE  Never done    Shingles Vaccine (1 of 2) Never done    Pneumococcal Vaccines (Age 65+) (1 of 2 - PPSV23) 12/14/2020    COVID-19 Vaccine (3 - Booster for Moderna series) 10/03/2021    Lipid Panel  10/19/2021    Mammogram  10/20/2021    Hemoglobin A1c  11/22/2021     Patient has a lab appointment scheduled 1/26/22, urine micro and Lipid linked to appointment. Patient has an appointment scheduled with PCP on 2/2/22 for 6 month follow up.

## 2022-01-19 DIAGNOSIS — Z79.4 TYPE 2 DIABETES MELLITUS WITH HYPERGLYCEMIA, WITH LONG-TERM CURRENT USE OF INSULIN: Chronic | ICD-10-CM

## 2022-01-19 DIAGNOSIS — E11.65 TYPE 2 DIABETES MELLITUS WITH HYPERGLYCEMIA, WITH LONG-TERM CURRENT USE OF INSULIN: Chronic | ICD-10-CM

## 2022-01-19 RX ORDER — METFORMIN HYDROCHLORIDE 500 MG/1
500 TABLET, EXTENDED RELEASE ORAL DAILY
Qty: 30 TABLET | Refills: 0 | Status: SHIPPED | OUTPATIENT
Start: 2022-01-19 | End: 2022-02-02 | Stop reason: SDUPTHER

## 2022-01-19 NOTE — TELEPHONE ENCOUNTER
Care Due:                  Date            Visit Type   Department     Provider  --------------------------------------------------------------------------------                                             [facility] INTERNAL  Last Visit: 07-      None         VELMA Merrill                                           [facility] INTERNAL  Next Visit: 02-      None         VELMA Merrill                                                            Last  Test          Frequency    Reason                     Performed    Due Date  --------------------------------------------------------------------------------    CMP.........  12 months..  atorvastatin.............  Not Found    Overdue    HBA1C.......  6 months...  metFORMIN................  Not Found    Overdue    Lipid Panel.  12 months..  atorvastatin.............  Not Found    Overdue    Powered by [employer]. Reference number: [id number].   1/19/2022 12:01:55 PM CST

## 2022-01-20 NOTE — TELEPHONE ENCOUNTER
REFILL APPROVED  - LABS AND APPOINTMENT REQUIRED FOR MORE REFILLS  Medications Ordered This Encounter   Medications    metFORMIN (GLUCOPHAGE-XR) 500 MG ER 24hr tablet     Sig: Take 1 tablet (500 mg total) by mouth once daily.     Dispense:  30 tablet     Refill:  0     Dispense only 30-day QTY. Do not request 90-day QTY. Labs and Appointment REQUIRED for more refills. Next appointment = Wednesday, February 2, 2022 at 9:00 AM.   #LMRX       Future Appointments   Date Time Provider Department Center   1/24/2022 10:30 AM MODERNA VACCINE[facility] [facility]   1/26/2022  8:30 AM LABORATORY, [facility] LAB [facility]   1/31/2022  7:30 AM BEVERLY Zamora MD [facility]   1/31/2022  9:00 AM Bayron Saldana MD [facility] CARDIO [facility]   2/2/2022  9:00 AM JAKE Merrill MD [facility] [place]       ORDERED PREVIOUSLY AND DUE OR OVERDUE  ORDER  ORDERED/DUE    Lipid Panel 05/13/2021   AST (SGOT) 05/13/2021   ALT (SGPT) 05/13/2021   Ambulatory referral/consult to Diabetes Education 07/09/2021   Basic Metabolic Panel 08/20/2021   Microalbumin/Creatinine Ratio, Urine 07/02/2021   Hemoglobin A1c 12/08/2021   Mammo Digital Screening Bilat 12/30/2021

## 2022-01-24 ENCOUNTER — IMMUNIZATION (OUTPATIENT)
Dept: PRIMARY CARE CLINIC | Facility: CLINIC | Age: 67
End: 2022-01-24
Payer: MEDICARE

## 2022-01-24 DIAGNOSIS — Z23 NEED FOR VACCINATION: Primary | ICD-10-CM

## 2022-01-24 PROCEDURE — 0064A COVID-19, MRNA, LNP-S, PF, 100 MCG/0.25 ML DOSE VACCINE (MODERNA BOOSTER): CPT | Mod: CV19,PBBFAC | Performed by: FAMILY MEDICINE

## 2022-01-31 ENCOUNTER — LAB VISIT (OUTPATIENT)
Dept: LAB | Facility: HOSPITAL | Age: 67
End: 2022-01-31
Attending: FAMILY MEDICINE
Payer: MEDICARE

## 2022-01-31 ENCOUNTER — PATIENT OUTREACH (OUTPATIENT)
Dept: ADMINISTRATIVE | Facility: OTHER | Age: 67
End: 2022-01-31
Payer: MEDICARE

## 2022-01-31 DIAGNOSIS — E11.65 TYPE 2 DIABETES MELLITUS WITH HYPERGLYCEMIA, WITH LONG-TERM CURRENT USE OF INSULIN: Primary | ICD-10-CM

## 2022-01-31 DIAGNOSIS — Z79.4 TYPE 2 DIABETES MELLITUS WITH HYPERGLYCEMIA, WITH LONG-TERM CURRENT USE OF INSULIN: Primary | ICD-10-CM

## 2022-01-31 DIAGNOSIS — E11.65 TYPE 2 DIABETES MELLITUS WITH HYPERGLYCEMIA, WITH LONG-TERM CURRENT USE OF INSULIN: ICD-10-CM

## 2022-01-31 DIAGNOSIS — Z79.4 TYPE 2 DIABETES MELLITUS WITH HYPERGLYCEMIA, WITH LONG-TERM CURRENT USE OF INSULIN: ICD-10-CM

## 2022-01-31 DIAGNOSIS — E78.00 PURE HYPERCHOLESTEROLEMIA: ICD-10-CM

## 2022-01-31 LAB
ALBUMIN SERPL BCP-MCNC: 3.5 G/DL (ref 3.5–5.2)
ALP SERPL-CCNC: 70 U/L (ref 55–135)
ALT SERPL W/O P-5'-P-CCNC: 29 U/L (ref 10–44)
ANION GAP SERPL CALC-SCNC: 10 MMOL/L (ref 8–16)
AST SERPL-CCNC: 31 U/L (ref 10–40)
BILIRUB SERPL-MCNC: 0.5 MG/DL (ref 0.1–1)
BUN SERPL-MCNC: 15 MG/DL (ref 8–23)
CALCIUM SERPL-MCNC: 10 MG/DL (ref 8.7–10.5)
CHLORIDE SERPL-SCNC: 102 MMOL/L (ref 95–110)
CHOLEST SERPL-MCNC: 196 MG/DL (ref 120–199)
CHOLEST/HDLC SERPL: 4 {RATIO} (ref 2–5)
CO2 SERPL-SCNC: 25 MMOL/L (ref 23–29)
CREAT SERPL-MCNC: 0.8 MG/DL (ref 0.5–1.4)
EST. GFR  (AFRICAN AMERICAN): >60 ML/MIN/1.73 M^2
EST. GFR  (NON AFRICAN AMERICAN): >60 ML/MIN/1.73 M^2
ESTIMATED AVG GLUCOSE: 134 MG/DL (ref 68–131)
GLUCOSE SERPL-MCNC: 138 MG/DL (ref 70–110)
HBA1C MFR BLD: 6.3 % (ref 4–5.6)
HDLC SERPL-MCNC: 49 MG/DL (ref 40–75)
HDLC SERPL: 25 % (ref 20–50)
LDLC SERPL CALC-MCNC: 126.8 MG/DL (ref 63–159)
NONHDLC SERPL-MCNC: 147 MG/DL
POTASSIUM SERPL-SCNC: 3.2 MMOL/L (ref 3.5–5.1)
PROT SERPL-MCNC: 7.7 G/DL (ref 6–8.4)
SODIUM SERPL-SCNC: 137 MMOL/L (ref 136–145)
TRIGL SERPL-MCNC: 101 MG/DL (ref 30–150)

## 2022-01-31 PROCEDURE — 83036 HEMOGLOBIN GLYCOSYLATED A1C: CPT | Performed by: FAMILY MEDICINE

## 2022-01-31 PROCEDURE — 36415 COLL VENOUS BLD VENIPUNCTURE: CPT | Performed by: FAMILY MEDICINE

## 2022-01-31 PROCEDURE — 80053 COMPREHEN METABOLIC PANEL: CPT | Performed by: FAMILY MEDICINE

## 2022-01-31 PROCEDURE — 80061 LIPID PANEL: CPT | Performed by: FAMILY MEDICINE

## 2022-01-31 NOTE — Clinical Note
--------------------------------------------------------------------------------   JUANCHO ORTEGA (MRN [id number]) -------------------------------- Labs ordered. Please schedule.  -Hemoglobin A1C  -Comprehensive Metabolic Panel  -Microalbumin/Creatinine Ratio, Urine  -Lipid Panel Thanks. --------------------------------------------------------------------------------

## 2022-01-31 NOTE — PROGRESS NOTES
Orders Placed This Encounter   Procedures    Hemoglobin A1C    Comprehensive Metabolic Panel    Microalbumin/Creatinine Ratio, Urine    Lipid Panel

## 2022-02-02 ENCOUNTER — OFFICE VISIT (OUTPATIENT)
Dept: INTERNAL MEDICINE | Facility: CLINIC | Age: 67
End: 2022-02-02
Payer: MEDICARE

## 2022-02-02 VITALS
HEIGHT: 66 IN | HEART RATE: 77 BPM | SYSTOLIC BLOOD PRESSURE: 120 MMHG | DIASTOLIC BLOOD PRESSURE: 70 MMHG | WEIGHT: 234.38 LBS | TEMPERATURE: 98 F | OXYGEN SATURATION: 99 % | BODY MASS INDEX: 37.67 KG/M2

## 2022-02-02 DIAGNOSIS — E87.6 DIURETIC-INDUCED HYPOKALEMIA: ICD-10-CM

## 2022-02-02 DIAGNOSIS — E11.29 TYPE 2 DIABETES MELLITUS WITH MICROALBUMINURIA, WITH LONG-TERM CURRENT USE OF INSULIN: Primary | ICD-10-CM

## 2022-02-02 DIAGNOSIS — Z23 NEED FOR SHINGLES VACCINE: ICD-10-CM

## 2022-02-02 DIAGNOSIS — Z12.31 BREAST CANCER SCREENING BY MAMMOGRAM: ICD-10-CM

## 2022-02-02 DIAGNOSIS — T50.2X5A DIURETIC-INDUCED HYPOKALEMIA: ICD-10-CM

## 2022-02-02 DIAGNOSIS — R80.9 TYPE 2 DIABETES MELLITUS WITH MICROALBUMINURIA, WITH LONG-TERM CURRENT USE OF INSULIN: Primary | ICD-10-CM

## 2022-02-02 DIAGNOSIS — Z79.01 ANTICOAGULATED: ICD-10-CM

## 2022-02-02 DIAGNOSIS — I10 ESSENTIAL HYPERTENSION: Chronic | ICD-10-CM

## 2022-02-02 DIAGNOSIS — Z23 NEED FOR TD VACCINE: ICD-10-CM

## 2022-02-02 DIAGNOSIS — Z23 NEED FOR 23-POLYVALENT PNEUMOCOCCAL POLYSACCHARIDE VACCINE: ICD-10-CM

## 2022-02-02 DIAGNOSIS — I48.0 PAROXYSMAL ATRIAL FIBRILLATION: Chronic | ICD-10-CM

## 2022-02-02 DIAGNOSIS — E66.01 CLASS 2 SEVERE OBESITY DUE TO EXCESS CALORIES WITH SERIOUS COMORBIDITY AND BODY MASS INDEX (BMI) OF 37.0 TO 37.9 IN ADULT: Chronic | ICD-10-CM

## 2022-02-02 DIAGNOSIS — E78.00 PURE HYPERCHOLESTEROLEMIA: Chronic | ICD-10-CM

## 2022-02-02 DIAGNOSIS — Z79.4 TYPE 2 DIABETES MELLITUS WITH MICROALBUMINURIA, WITH LONG-TERM CURRENT USE OF INSULIN: Primary | ICD-10-CM

## 2022-02-02 PROBLEM — E11.69 TYPE 2 DIABETES MELLITUS WITH OTHER SPECIFIED COMPLICATION: Status: ACTIVE | Noted: 2021-06-03

## 2022-02-02 PROCEDURE — 3044F HG A1C LEVEL LT 7.0%: CPT | Mod: CPTII,S$GLB,, | Performed by: FAMILY MEDICINE

## 2022-02-02 PROCEDURE — 3288F PR FALLS RISK ASSESSMENT DOCUMENTED: ICD-10-PCS | Mod: CPTII,S$GLB,, | Performed by: FAMILY MEDICINE

## 2022-02-02 PROCEDURE — 3061F PR NEG MICROALBUMINURIA RESULT DOCUMENTED/REVIEW: ICD-10-PCS | Mod: CPTII,S$GLB,, | Performed by: FAMILY MEDICINE

## 2022-02-02 PROCEDURE — G0009 ADMIN PNEUMOCOCCAL VACCINE: HCPCS | Mod: S$GLB,,, | Performed by: FAMILY MEDICINE

## 2022-02-02 PROCEDURE — 1101F PR PT FALLS ASSESS DOC 0-1 FALLS W/OUT INJ PAST YR: ICD-10-PCS | Mod: CPTII,S$GLB,, | Performed by: FAMILY MEDICINE

## 2022-02-02 PROCEDURE — 3288F FALL RISK ASSESSMENT DOCD: CPT | Mod: CPTII,S$GLB,, | Performed by: FAMILY MEDICINE

## 2022-02-02 PROCEDURE — 1101F PT FALLS ASSESS-DOCD LE1/YR: CPT | Mod: CPTII,S$GLB,, | Performed by: FAMILY MEDICINE

## 2022-02-02 PROCEDURE — 3074F PR MOST RECENT SYSTOLIC BLOOD PRESSURE < 130 MM HG: ICD-10-PCS | Mod: CPTII,S$GLB,, | Performed by: FAMILY MEDICINE

## 2022-02-02 PROCEDURE — 3066F NEPHROPATHY DOC TX: CPT | Mod: CPTII,S$GLB,, | Performed by: FAMILY MEDICINE

## 2022-02-02 PROCEDURE — 3074F SYST BP LT 130 MM HG: CPT | Mod: CPTII,S$GLB,, | Performed by: FAMILY MEDICINE

## 2022-02-02 PROCEDURE — 3072F LOW RISK FOR RETINOPATHY: CPT | Mod: CPTII,S$GLB,, | Performed by: FAMILY MEDICINE

## 2022-02-02 PROCEDURE — 99214 OFFICE O/P EST MOD 30 MIN: CPT | Mod: S$GLB,,, | Performed by: FAMILY MEDICINE

## 2022-02-02 PROCEDURE — 99999 PR PBB SHADOW E&M-EST. PATIENT-LVL V: CPT | Mod: PBBFAC,,, | Performed by: FAMILY MEDICINE

## 2022-02-02 PROCEDURE — 99999 PR PBB SHADOW E&M-EST. PATIENT-LVL V: ICD-10-PCS | Mod: PBBFAC,,, | Performed by: FAMILY MEDICINE

## 2022-02-02 PROCEDURE — 3061F NEG MICROALBUMINURIA REV: CPT | Mod: CPTII,S$GLB,, | Performed by: FAMILY MEDICINE

## 2022-02-02 PROCEDURE — 3072F PR LOW RISK FOR RETINOPATHY: ICD-10-PCS | Mod: CPTII,S$GLB,, | Performed by: FAMILY MEDICINE

## 2022-02-02 PROCEDURE — 99499 UNLISTED E&M SERVICE: CPT | Mod: S$GLB,,, | Performed by: FAMILY MEDICINE

## 2022-02-02 PROCEDURE — 99214 PR OFFICE/OUTPT VISIT, EST, LEVL IV, 30-39 MIN: ICD-10-PCS | Mod: S$GLB,,, | Performed by: FAMILY MEDICINE

## 2022-02-02 PROCEDURE — G0009 PNEUMOCOCCAL POLYSACCHARIDE VACCINE 23-VALENT =>2YO SQ IM: ICD-10-PCS | Mod: S$GLB,,, | Performed by: FAMILY MEDICINE

## 2022-02-02 PROCEDURE — 3078F DIAST BP <80 MM HG: CPT | Mod: CPTII,S$GLB,, | Performed by: FAMILY MEDICINE

## 2022-02-02 PROCEDURE — 3078F PR MOST RECENT DIASTOLIC BLOOD PRESSURE < 80 MM HG: ICD-10-PCS | Mod: CPTII,S$GLB,, | Performed by: FAMILY MEDICINE

## 2022-02-02 PROCEDURE — 1126F AMNT PAIN NOTED NONE PRSNT: CPT | Mod: CPTII,S$GLB,, | Performed by: FAMILY MEDICINE

## 2022-02-02 PROCEDURE — 1126F PR PAIN SEVERITY QUANTIFIED, NO PAIN PRESENT: ICD-10-PCS | Mod: CPTII,S$GLB,, | Performed by: FAMILY MEDICINE

## 2022-02-02 PROCEDURE — 90732 PPSV23 VACC 2 YRS+ SUBQ/IM: CPT | Mod: S$GLB,,, | Performed by: FAMILY MEDICINE

## 2022-02-02 PROCEDURE — 99499 RISK ADDL DX/OHS AUDIT: ICD-10-PCS | Mod: S$GLB,,, | Performed by: FAMILY MEDICINE

## 2022-02-02 PROCEDURE — 3066F PR DOCUMENTATION OF TREATMENT FOR NEPHROPATHY: ICD-10-PCS | Mod: CPTII,S$GLB,, | Performed by: FAMILY MEDICINE

## 2022-02-02 PROCEDURE — 3008F BODY MASS INDEX DOCD: CPT | Mod: CPTII,S$GLB,, | Performed by: FAMILY MEDICINE

## 2022-02-02 PROCEDURE — 90732 PNEUMOCOCCAL POLYSACCHARIDE VACCINE 23-VALENT =>2YO SQ IM: ICD-10-PCS | Mod: S$GLB,,, | Performed by: FAMILY MEDICINE

## 2022-02-02 PROCEDURE — 3008F PR BODY MASS INDEX (BMI) DOCUMENTED: ICD-10-PCS | Mod: CPTII,S$GLB,, | Performed by: FAMILY MEDICINE

## 2022-02-02 PROCEDURE — 3044F PR MOST RECENT HEMOGLOBIN A1C LEVEL <7.0%: ICD-10-PCS | Mod: CPTII,S$GLB,, | Performed by: FAMILY MEDICINE

## 2022-02-02 RX ORDER — POTASSIUM CHLORIDE 750 MG/1
10 TABLET, EXTENDED RELEASE ORAL DAILY
Qty: 90 TABLET | Refills: 3 | Status: SHIPPED | OUTPATIENT
Start: 2022-02-02 | End: 2022-02-02 | Stop reason: SDUPTHER

## 2022-02-02 RX ORDER — INSULIN LISPRO 100 [IU]/ML
9 INJECTION, SOLUTION INTRAVENOUS; SUBCUTANEOUS
Qty: 15 ML | Refills: 2 | Status: SHIPPED | OUTPATIENT
Start: 2022-02-02 | End: 2022-11-09 | Stop reason: SDUPTHER

## 2022-02-02 RX ORDER — INSULIN GLARGINE 100 [IU]/ML
30 INJECTION, SOLUTION SUBCUTANEOUS NIGHTLY
COMMUNITY
Start: 2022-01-10 | End: 2022-02-02 | Stop reason: SDUPTHER

## 2022-02-02 RX ORDER — POTASSIUM CHLORIDE 750 MG/1
10 TABLET, EXTENDED RELEASE ORAL DAILY
Qty: 90 TABLET | Refills: 3 | Status: SHIPPED | OUTPATIENT
Start: 2022-02-02 | End: 2022-10-17 | Stop reason: SDUPTHER

## 2022-02-02 RX ORDER — METOPROLOL SUCCINATE 25 MG/1
25 TABLET, EXTENDED RELEASE ORAL NIGHTLY
Qty: 90 TABLET | Refills: 3 | Status: SHIPPED | OUTPATIENT
Start: 2022-02-02 | End: 2022-08-18 | Stop reason: SDUPTHER

## 2022-02-02 RX ORDER — METFORMIN HYDROCHLORIDE 500 MG/1
500 TABLET, EXTENDED RELEASE ORAL DAILY
Qty: 90 TABLET | Refills: 3 | Status: SHIPPED | OUTPATIENT
Start: 2022-02-02 | End: 2022-02-02 | Stop reason: SDUPTHER

## 2022-02-02 RX ORDER — AMLODIPINE BESYLATE 5 MG/1
5 TABLET ORAL DAILY
Qty: 90 TABLET | Refills: 3 | Status: SHIPPED | OUTPATIENT
Start: 2022-02-02 | End: 2022-02-02 | Stop reason: SDUPTHER

## 2022-02-02 RX ORDER — LOSARTAN POTASSIUM AND HYDROCHLOROTHIAZIDE 25; 100 MG/1; MG/1
1 TABLET ORAL DAILY
Qty: 90 TABLET | Refills: 3 | Status: SHIPPED | OUTPATIENT
Start: 2022-02-02 | End: 2022-08-18 | Stop reason: ALTCHOICE

## 2022-02-02 RX ORDER — PEN NEEDLE, DIABETIC 32GX 5/32"
NEEDLE, DISPOSABLE MISCELLANEOUS
Qty: 200 EACH | Refills: 11 | Status: SHIPPED | OUTPATIENT
Start: 2022-02-02 | End: 2023-03-14 | Stop reason: SDUPTHER

## 2022-02-02 RX ORDER — AMLODIPINE BESYLATE 5 MG/1
5 TABLET ORAL DAILY
Qty: 90 TABLET | Refills: 3 | Status: SHIPPED | OUTPATIENT
Start: 2022-02-02 | End: 2022-08-18 | Stop reason: DRUGHIGH

## 2022-02-02 RX ORDER — ROSUVASTATIN CALCIUM 20 MG/1
20 TABLET, COATED ORAL NIGHTLY
Qty: 90 TABLET | Refills: 3 | Status: SHIPPED | OUTPATIENT
Start: 2022-02-02 | End: 2022-10-17 | Stop reason: SDUPTHER

## 2022-02-02 RX ORDER — LOSARTAN POTASSIUM AND HYDROCHLOROTHIAZIDE 25; 100 MG/1; MG/1
1 TABLET ORAL DAILY
Qty: 90 TABLET | Refills: 3 | Status: SHIPPED | OUTPATIENT
Start: 2022-02-02 | End: 2022-02-02 | Stop reason: SDUPTHER

## 2022-02-02 RX ORDER — METFORMIN HYDROCHLORIDE 500 MG/1
500 TABLET, EXTENDED RELEASE ORAL DAILY
Qty: 90 TABLET | Refills: 3 | Status: SHIPPED | OUTPATIENT
Start: 2022-02-02 | End: 2022-10-17 | Stop reason: SDUPTHER

## 2022-02-02 RX ORDER — ROSUVASTATIN CALCIUM 20 MG/1
20 TABLET, COATED ORAL DAILY
Qty: 90 TABLET | Refills: 3 | Status: SHIPPED | OUTPATIENT
Start: 2022-02-02 | End: 2022-02-02 | Stop reason: SDUPTHER

## 2022-02-02 NOTE — ASSESSMENT & PLAN NOTE
BP Readings from Last 6 Encounters:   02/02/22 120/70   10/27/21 126/74   10/18/21 (!) 170/110   07/15/21 130/84   07/02/21 (!) 152/94   06/03/21 124/80

## 2022-02-02 NOTE — ASSESSMENT & PLAN NOTE
Lab Results   Component Value Date    K 3.2 (L) 01/31/2022    K 3.8 10/18/2021    K 3.7 11/13/2020    ESTGFRAFRICA >60.0 01/31/2022    EGFRNONAA >60.0 01/31/2022    CREATININE 0.8 01/31/2022    BUN 15 01/31/2022

## 2022-02-02 NOTE — ASSESSMENT & PLAN NOTE
Lab Results   Component Value Date    CHOL 196 01/31/2022    CHOL 255 (H) 10/19/2020    TRIG 101 01/31/2022    TRIG 99 10/19/2020    HDL 49 01/31/2022    HDL 66 10/19/2020    LDLCALC 126.8 01/31/2022    LDLCALC 169.2 (H) 10/19/2020    NONHDLCHOL 147 01/31/2022    NONHDLCHOL 189 10/19/2020    AST 31 01/31/2022    ALT 29 01/31/2022

## 2022-02-02 NOTE — ASSESSMENT & PLAN NOTE
"Wt Readings from Last 6 Encounters:   02/02/22 106.3 kg (234 lb 5.6 oz)   10/27/21 103.5 kg (228 lb 2.8 oz)   10/18/21 102.9 kg (226 lb 13.7 oz)   07/15/21 100.2 kg (220 lb 14.4 oz)   07/02/21 100.6 kg (221 lb 12.5 oz)   06/03/21 102.2 kg (225 lb 5 oz)     Estimated body mass index is 37.82 kg/m² as calculated from the following:    Height as of this encounter: 5' 6" (1.676 m).    Weight as of this encounter: 106.3 kg (234 lb 5.6 oz).    "

## 2022-02-02 NOTE — ASSESSMENT & PLAN NOTE
Diabetes Management Status    Statin: Taking  ACE/ARB: Taking    Screening or Prevention Patient's value Goal Complete/Controlled?   HgA1C Testing and Control   Lab Results   Component Value Date    HGBA1C 6.3 (H) 01/31/2022      Annually/Less than 8% Yes   Lipid profile : 01/31/2022 Annually Yes   LDL control Lab Results   Component Value Date    LDLCALC 126.8 01/31/2022    Annually/Less than 100 mg/dl  No   Nephropathy screening Lab Results   Component Value Date    LABMICR 28.0 01/31/2022     No results found for: PROTEINUA Annually Yes   Blood pressure BP Readings from Last 1 Encounters:   02/02/22 120/70    Less than 140/90 Yes   Dilated retinal exam : 08/03/2021 Annually Yes   Foot exam   : 07/02/2021 Annually Yes     Lab Results   Component Value Date    HGBA1C 6.3 (H) 01/31/2022    HGBA1C 15.6 (H) 05/22/2021    ESTGFRAFRICA >60.0 01/31/2022    EGFRNONAA >60.0 01/31/2022    MICALBCREAT 8.6 01/31/2022    LDLCALC 126.8 01/31/2022     Last 6 Patient Entered Readings                                          Most Recent A1c:     There is no flowsheet data to display.        HEALTH MAINTENANCE: Diabetic health maintenance interventions reviewed and are up to date except for:  There are no preventive care reminders to display for this patient.

## 2022-02-14 ENCOUNTER — OFFICE VISIT (OUTPATIENT)
Dept: OPHTHALMOLOGY | Facility: CLINIC | Age: 67
End: 2022-02-14
Payer: MEDICARE

## 2022-02-14 DIAGNOSIS — H04.129 DRY EYE: ICD-10-CM

## 2022-02-14 DIAGNOSIS — Z83.511 FAMILY HISTORY OF GLAUCOMA: ICD-10-CM

## 2022-02-14 DIAGNOSIS — H47.393 OPTIC NERVE CUPPING, BILATERAL: ICD-10-CM

## 2022-02-14 DIAGNOSIS — H40.003 GLAUCOMA SUSPECT OF BOTH EYES: ICD-10-CM

## 2022-02-14 DIAGNOSIS — E11.36 DIABETIC CATARACT OF BOTH EYES: ICD-10-CM

## 2022-02-14 PROCEDURE — 99999 PR PBB SHADOW E&M-EST. PATIENT-LVL III: CPT | Mod: PBBFAC,,, | Performed by: OPHTHALMOLOGY

## 2022-02-14 PROCEDURE — 3066F PR DOCUMENTATION OF TREATMENT FOR NEPHROPATHY: ICD-10-PCS | Mod: CPTII,S$GLB,, | Performed by: OPHTHALMOLOGY

## 2022-02-14 PROCEDURE — 1126F PR PAIN SEVERITY QUANTIFIED, NO PAIN PRESENT: ICD-10-PCS | Mod: CPTII,S$GLB,, | Performed by: OPHTHALMOLOGY

## 2022-02-14 PROCEDURE — 3061F PR NEG MICROALBUMINURIA RESULT DOCUMENTED/REVIEW: ICD-10-PCS | Mod: CPTII,S$GLB,, | Performed by: OPHTHALMOLOGY

## 2022-02-14 PROCEDURE — 1126F AMNT PAIN NOTED NONE PRSNT: CPT | Mod: CPTII,S$GLB,, | Performed by: OPHTHALMOLOGY

## 2022-02-14 PROCEDURE — 99212 OFFICE O/P EST SF 10 MIN: CPT | Mod: S$GLB,,, | Performed by: OPHTHALMOLOGY

## 2022-02-14 PROCEDURE — 1159F MED LIST DOCD IN RCRD: CPT | Mod: CPTII,S$GLB,, | Performed by: OPHTHALMOLOGY

## 2022-02-14 PROCEDURE — 99999 PR PBB SHADOW E&M-EST. PATIENT-LVL III: ICD-10-PCS | Mod: PBBFAC,,, | Performed by: OPHTHALMOLOGY

## 2022-02-14 PROCEDURE — 3044F HG A1C LEVEL LT 7.0%: CPT | Mod: CPTII,S$GLB,, | Performed by: OPHTHALMOLOGY

## 2022-02-14 PROCEDURE — 1160F RVW MEDS BY RX/DR IN RCRD: CPT | Mod: CPTII,S$GLB,, | Performed by: OPHTHALMOLOGY

## 2022-02-14 PROCEDURE — 1160F PR REVIEW ALL MEDS BY PRESCRIBER/CLIN PHARMACIST DOCUMENTED: ICD-10-PCS | Mod: CPTII,S$GLB,, | Performed by: OPHTHALMOLOGY

## 2022-02-14 PROCEDURE — 3066F NEPHROPATHY DOC TX: CPT | Mod: CPTII,S$GLB,, | Performed by: OPHTHALMOLOGY

## 2022-02-14 PROCEDURE — 3061F NEG MICROALBUMINURIA REV: CPT | Mod: CPTII,S$GLB,, | Performed by: OPHTHALMOLOGY

## 2022-02-14 PROCEDURE — 1159F PR MEDICATION LIST DOCUMENTED IN MEDICAL RECORD: ICD-10-PCS | Mod: CPTII,S$GLB,, | Performed by: OPHTHALMOLOGY

## 2022-02-14 PROCEDURE — 99212 PR OFFICE/OUTPT VISIT, EST, LEVL II, 10-19 MIN: ICD-10-PCS | Mod: S$GLB,,, | Performed by: OPHTHALMOLOGY

## 2022-02-14 PROCEDURE — 3044F PR MOST RECENT HEMOGLOBIN A1C LEVEL <7.0%: ICD-10-PCS | Mod: CPTII,S$GLB,, | Performed by: OPHTHALMOLOGY

## 2022-02-14 NOTE — PROGRESS NOTES
===============================  Date today is 2/14/2022  Lacey Calderón is a 67 y.o. female  Last visit [facility]: :8/3/2021   Last visit eye dept. Visit date not found    Corrected distance visual acuity was 20/40 in the right eye and 20/20 in the left eye.  Tonometry     Tonometry (Applanation, 9:47 AM)       Right Left    Pressure 15 15              Wearing Rx     Wearing Rx       Sphere Cylinder Axis Add    Right +1.50 Sphere  +2.50    Left +1.50 +0.50 170 +2.50              Manifest Refraction     Manifest Refraction       Sphere Cylinder Axis Dist VA    Right +0.75   20/20    Left +0.75 +0.50 170 20/20              Not recorded       Chief Complaint   Patient presents with    Diabetic Eye Exam     PT STATES HERE FOR DM EYE EXAM       Problem List Items Addressed This Visit    None     Visit Diagnoses     Uncontrolled type 2 diabetes mellitus with diabetic cataract, with long-term current use of insulin    -  Primary    Glaucoma suspect of both eyes        Family history of glaucoma        Optic nerve cupping, bilateral        Dry eye        Diabetic cataract of both eyes              ________________  2/14/2022 today    DM no retinopathy  Moderate Dry eye  Quick TBUT OU- recommend tears 4-6x daily  IOP 21/21 today  C/d 0.7 OD uniform rim  C/d 0.75 OS  SCOAG by c/d and IOP-recommend glaucoma eval with MGM  2+ cortical cataract OU-becoming symptomatic    RTC 6-8 weeks with MGM for a glaucoma eval and 24-2 VF test  Instructed to call 24/7 for any worsening of vision or symptoms. Check OU daily.   Gave my office and cell phone number.          ===============================

## 2022-03-30 ENCOUNTER — OFFICE VISIT (OUTPATIENT)
Dept: OPHTHALMOLOGY | Facility: CLINIC | Age: 67
End: 2022-03-30
Payer: MEDICARE

## 2022-03-30 DIAGNOSIS — H25.012 CORTICAL SENILE CATARACT OF LEFT EYE: ICD-10-CM

## 2022-03-30 DIAGNOSIS — Z83.511 FAMILY HISTORY OF GLAUCOMA: ICD-10-CM

## 2022-03-30 DIAGNOSIS — H40.1131 PRIMARY OPEN ANGLE GLAUCOMA (POAG) OF BOTH EYES, MILD STAGE: Primary | ICD-10-CM

## 2022-03-30 DIAGNOSIS — H25.011 CORTICAL SENILE CATARACT OF RIGHT EYE: ICD-10-CM

## 2022-03-30 PROBLEM — E78.2 MIXED HYPERLIPIDEMIA: Status: ACTIVE | Noted: 2021-05-24

## 2022-03-30 PROBLEM — Z79.4 TYPE 2 DIABETES MELLITUS WITH HYPERGLYCEMIA, WITH LONG-TERM CURRENT USE OF INSULIN: Status: ACTIVE | Noted: 2021-05-24

## 2022-03-30 PROBLEM — E66.811 CLASS 1 OBESITY IN ADULT: Status: ACTIVE | Noted: 2021-05-24

## 2022-03-30 PROBLEM — E66.9 CLASS 1 OBESITY IN ADULT: Status: ACTIVE | Noted: 2021-05-24

## 2022-03-30 PROBLEM — M17.0 PRIMARY OSTEOARTHRITIS OF BOTH KNEES: Status: ACTIVE | Noted: 2021-05-24

## 2022-03-30 PROBLEM — E11.65 TYPE 2 DIABETES MELLITUS WITH HYPERGLYCEMIA, WITH LONG-TERM CURRENT USE OF INSULIN: Status: ACTIVE | Noted: 2021-05-24

## 2022-03-30 PROCEDURE — 76514 ECHO EXAM OF EYE THICKNESS: CPT | Mod: S$GLB,,, | Performed by: OPHTHALMOLOGY

## 2022-03-30 PROCEDURE — 92083 EXTENDED VISUAL FIELD XM: CPT | Mod: S$GLB,,, | Performed by: OPHTHALMOLOGY

## 2022-03-30 PROCEDURE — 3061F PR NEG MICROALBUMINURIA RESULT DOCUMENTED/REVIEW: ICD-10-PCS | Mod: CPTII,S$GLB,, | Performed by: OPHTHALMOLOGY

## 2022-03-30 PROCEDURE — 92020 PR SPECIAL EYE EVAL,GONIOSCOPY: ICD-10-PCS | Mod: S$GLB,,, | Performed by: OPHTHALMOLOGY

## 2022-03-30 PROCEDURE — 3044F HG A1C LEVEL LT 7.0%: CPT | Mod: CPTII,S$GLB,, | Performed by: OPHTHALMOLOGY

## 2022-03-30 PROCEDURE — 1159F MED LIST DOCD IN RCRD: CPT | Mod: CPTII,S$GLB,, | Performed by: OPHTHALMOLOGY

## 2022-03-30 PROCEDURE — 99499 UNLISTED E&M SERVICE: CPT | Mod: S$GLB,,, | Performed by: OPHTHALMOLOGY

## 2022-03-30 PROCEDURE — 99999 PR PBB SHADOW E&M-EST. PATIENT-LVL III: CPT | Mod: PBBFAC,,, | Performed by: OPHTHALMOLOGY

## 2022-03-30 PROCEDURE — 1159F PR MEDICATION LIST DOCUMENTED IN MEDICAL RECORD: ICD-10-PCS | Mod: CPTII,S$GLB,, | Performed by: OPHTHALMOLOGY

## 2022-03-30 PROCEDURE — 99499 RISK ADDL DX/OHS AUDIT: ICD-10-PCS | Mod: S$GLB,,, | Performed by: OPHTHALMOLOGY

## 2022-03-30 PROCEDURE — 76514 PR  US, EYE, FOR CORNEAL THICKNESS: ICD-10-PCS | Mod: S$GLB,,, | Performed by: OPHTHALMOLOGY

## 2022-03-30 PROCEDURE — 92133 CPTRZD OPH DX IMG PST SGM ON: CPT | Mod: S$GLB,,, | Performed by: OPHTHALMOLOGY

## 2022-03-30 PROCEDURE — 92020 GONIOSCOPY: CPT | Mod: S$GLB,,, | Performed by: OPHTHALMOLOGY

## 2022-03-30 PROCEDURE — 3061F NEG MICROALBUMINURIA REV: CPT | Mod: CPTII,S$GLB,, | Performed by: OPHTHALMOLOGY

## 2022-03-30 PROCEDURE — 3044F PR MOST RECENT HEMOGLOBIN A1C LEVEL <7.0%: ICD-10-PCS | Mod: CPTII,S$GLB,, | Performed by: OPHTHALMOLOGY

## 2022-03-30 PROCEDURE — 3066F PR DOCUMENTATION OF TREATMENT FOR NEPHROPATHY: ICD-10-PCS | Mod: CPTII,S$GLB,, | Performed by: OPHTHALMOLOGY

## 2022-03-30 PROCEDURE — 92133 POSTERIOR SEGMENT OCT OPTIC NERVE(OCULAR COHERENCE TOMOGRAPHY) - OU - BOTH EYES: ICD-10-PCS | Mod: S$GLB,,, | Performed by: OPHTHALMOLOGY

## 2022-03-30 PROCEDURE — 92083 HUMPHREY VISUAL FIELD - OU - BOTH EYES: ICD-10-PCS | Mod: S$GLB,,, | Performed by: OPHTHALMOLOGY

## 2022-03-30 PROCEDURE — 99214 OFFICE O/P EST MOD 30 MIN: CPT | Mod: S$GLB,,, | Performed by: OPHTHALMOLOGY

## 2022-03-30 PROCEDURE — 99214 PR OFFICE/OUTPT VISIT, EST, LEVL IV, 30-39 MIN: ICD-10-PCS | Mod: S$GLB,,, | Performed by: OPHTHALMOLOGY

## 2022-03-30 PROCEDURE — 99999 PR PBB SHADOW E&M-EST. PATIENT-LVL III: ICD-10-PCS | Mod: PBBFAC,,, | Performed by: OPHTHALMOLOGY

## 2022-03-30 PROCEDURE — 3066F NEPHROPATHY DOC TX: CPT | Mod: CPTII,S$GLB,, | Performed by: OPHTHALMOLOGY

## 2022-03-30 RX ORDER — LATANOPROST 50 UG/ML
1 SOLUTION/ DROPS OPHTHALMIC DAILY
Qty: 2.5 ML | Refills: 6 | Status: SHIPPED | OUTPATIENT
Start: 2022-03-30 | End: 2022-05-31 | Stop reason: ALTCHOICE

## 2022-03-30 RX ORDER — CLOSTRIDIUM TETANI TOXOID ANTIGEN (FORMALDEHYDE INACTIVATED), CORYNEBACTERIUM DIPHTHERIAE TOXOID ANTIGEN (FORMALDEHYDE INACTIVATED), BORDETELLA PERTUSSIS TOXOID ANTIGEN (GLUTARALDEHYDE INACTIVATED), BORDETELLA PERTUSSIS FILAMENTOUS HEMAGGLUTININ ANTIGEN (FORMALDEHYDE INACTIVATED), BORDETELLA PERTUSSIS PERTACTIN ANTIGEN, AND BORDETELLA PERTUSSIS FIMBRIAE 2/3 ANTIGEN 5; 2; 2.5; 5; 3; 5 [LF]/.5ML; [LF]/.5ML; UG/.5ML; UG/.5ML; UG/.5ML; UG/.5ML
INJECTION, SUSPENSION INTRAMUSCULAR
COMMUNITY
Start: 2022-02-23

## 2022-03-30 RX ORDER — PNEUMOCOCCAL VACCINE POLYVALENT 25; 25; 25; 25; 25; 25; 25; 25; 25; 25; 25; 25; 25; 25; 25; 25; 25; 25; 25; 25; 25; 25; 25 UG/.5ML; UG/.5ML; UG/.5ML; UG/.5ML; UG/.5ML; UG/.5ML; UG/.5ML; UG/.5ML; UG/.5ML; UG/.5ML; UG/.5ML; UG/.5ML; UG/.5ML; UG/.5ML; UG/.5ML; UG/.5ML; UG/.5ML; UG/.5ML; UG/.5ML; UG/.5ML; UG/.5ML; UG/.5ML; UG/.5ML
INJECTION, SOLUTION INTRAMUSCULAR; SUBCUTANEOUS
COMMUNITY
Start: 2022-02-23 | End: 2023-08-17

## 2022-03-30 NOTE — PROGRESS NOTES
HPI     Glaucoma     Comments: COAG blas per [facility]              Comments     Patient referred by Dr Zamora for glauc eval - Family history of Glaucoma   with Mom - unsure of any siblings with glaucoma ( 10 siblings) - using   Refresh OU prn for dryness OU-  Va stable OU - denies pain OU        DMII 5/2021   HTN  CATARACTS  SCOAG by c/d and IOP    Fam H/O Glaucoma - Mom            Last edited by Nohelia Gandhi MA on 3/30/2022 10:39 AM. (History)            Assessment /Plan     For exam results, see Encounter Report.      ICD-10-CM ICD-9-CM    1. Primary open angle glaucoma (POAG) of both eyes, mild stage  H40.1131 365.11 Hansen Visual Field - OU - Extended - Both Eyes- performed today, normal findings.      365.71 Posterior Segment OCT Optic Nerve- Both eyes performed today, OD: borderline/low, OS: normal. G.C. L 29-26    Evidence of glaucoma today on exam/testing. Will begin Latanoprost qd OU.    2. Family history of glaucoma  Z83.511 V19.11 See above   3. Uncontrolled type 2 diabetes mellitus with diabetic cataract, with long-term current use of insulin  E11.36 250.52 Diabetes with no diabetic retinopathy on dilated exam.   Reviewed diabetic eye precautions including excellent blood sugar control, and importance of regular follow up.           Z79.4 366.41     E11.65 V58.67    4. Cortical senile cataract of left eye  H25.012 366.15   You were found to have an early cataract in your eye(s) today, however the cataract is not affecting your activities of daily living, such as reading and driving.You do not need  surgery at this time. We will recheck your cataract at your next visit. You are welcome to call for an earlier appointment if your vision gets worse.      5. Cortical senile cataract of right eye  H25.011 366.15 See above       Return to clinic 2 months for IOP check        Latanoprost qd OU

## 2022-04-07 ENCOUNTER — TELEPHONE (OUTPATIENT)
Dept: ADMINISTRATIVE | Facility: HOSPITAL | Age: 67
End: 2022-04-07
Payer: MEDICARE

## 2022-05-31 ENCOUNTER — OFFICE VISIT (OUTPATIENT)
Dept: OPHTHALMOLOGY | Facility: CLINIC | Age: 67
End: 2022-05-31
Payer: MEDICARE

## 2022-05-31 DIAGNOSIS — H40.1131 PRIMARY OPEN ANGLE GLAUCOMA (POAG) OF BOTH EYES, MILD STAGE: Primary | ICD-10-CM

## 2022-05-31 DIAGNOSIS — Z83.511 FAMILY HISTORY OF GLAUCOMA: ICD-10-CM

## 2022-05-31 DIAGNOSIS — H25.012 CORTICAL SENILE CATARACT OF LEFT EYE: ICD-10-CM

## 2022-05-31 DIAGNOSIS — H25.011 CORTICAL SENILE CATARACT OF RIGHT EYE: ICD-10-CM

## 2022-05-31 PROCEDURE — 99214 PR OFFICE/OUTPT VISIT, EST, LEVL IV, 30-39 MIN: ICD-10-PCS | Mod: S$GLB,,, | Performed by: OPHTHALMOLOGY

## 2022-05-31 PROCEDURE — 3061F NEG MICROALBUMINURIA REV: CPT | Mod: CPTII,S$GLB,, | Performed by: OPHTHALMOLOGY

## 2022-05-31 PROCEDURE — 1159F MED LIST DOCD IN RCRD: CPT | Mod: CPTII,S$GLB,, | Performed by: OPHTHALMOLOGY

## 2022-05-31 PROCEDURE — 1160F RVW MEDS BY RX/DR IN RCRD: CPT | Mod: CPTII,S$GLB,, | Performed by: OPHTHALMOLOGY

## 2022-05-31 PROCEDURE — 99999 PR PBB SHADOW E&M-EST. PATIENT-LVL I: ICD-10-PCS | Mod: PBBFAC,,, | Performed by: OPHTHALMOLOGY

## 2022-05-31 PROCEDURE — 99499 UNLISTED E&M SERVICE: CPT | Mod: S$GLB,,, | Performed by: OPHTHALMOLOGY

## 2022-05-31 PROCEDURE — 3061F PR NEG MICROALBUMINURIA RESULT DOCUMENTED/REVIEW: ICD-10-PCS | Mod: CPTII,S$GLB,, | Performed by: OPHTHALMOLOGY

## 2022-05-31 PROCEDURE — 99499 RISK ADDL DX/OHS AUDIT: ICD-10-PCS | Mod: S$GLB,,, | Performed by: OPHTHALMOLOGY

## 2022-05-31 PROCEDURE — 1159F PR MEDICATION LIST DOCUMENTED IN MEDICAL RECORD: ICD-10-PCS | Mod: CPTII,S$GLB,, | Performed by: OPHTHALMOLOGY

## 2022-05-31 PROCEDURE — 3066F PR DOCUMENTATION OF TREATMENT FOR NEPHROPATHY: ICD-10-PCS | Mod: CPTII,S$GLB,, | Performed by: OPHTHALMOLOGY

## 2022-05-31 PROCEDURE — 99999 PR PBB SHADOW E&M-EST. PATIENT-LVL I: CPT | Mod: PBBFAC,,, | Performed by: OPHTHALMOLOGY

## 2022-05-31 PROCEDURE — 3044F HG A1C LEVEL LT 7.0%: CPT | Mod: CPTII,S$GLB,, | Performed by: OPHTHALMOLOGY

## 2022-05-31 PROCEDURE — 1160F PR REVIEW ALL MEDS BY PRESCRIBER/CLIN PHARMACIST DOCUMENTED: ICD-10-PCS | Mod: CPTII,S$GLB,, | Performed by: OPHTHALMOLOGY

## 2022-05-31 PROCEDURE — 3066F NEPHROPATHY DOC TX: CPT | Mod: CPTII,S$GLB,, | Performed by: OPHTHALMOLOGY

## 2022-05-31 PROCEDURE — 99214 OFFICE O/P EST MOD 30 MIN: CPT | Mod: S$GLB,,, | Performed by: OPHTHALMOLOGY

## 2022-05-31 PROCEDURE — 3044F PR MOST RECENT HEMOGLOBIN A1C LEVEL <7.0%: ICD-10-PCS | Mod: CPTII,S$GLB,, | Performed by: OPHTHALMOLOGY

## 2022-05-31 RX ORDER — DORZOLAMIDE HYDROCHLORIDE AND TIMOLOL MALEATE 20; 5 MG/ML; MG/ML
1 SOLUTION/ DROPS OPHTHALMIC EVERY 12 HOURS
Qty: 10 ML | Refills: 6 | Status: SHIPPED | OUTPATIENT
Start: 2022-05-31 | End: 2022-08-03 | Stop reason: SDUPTHER

## 2022-05-31 NOTE — PROGRESS NOTES
HPI     DMII 5/2021   HTN  CATARACTS  SCOAG by c/d and IOP  G.C. L 29-26--3/30/2022    Fam H/O Glaucoma - Mom  Pt lost mom- 3/2022, she was MGM Pt (Ana Montoya)      Latanoprost qd OU    Last edited by Tanner Haque MD on 5/31/2022  9:29 AM. (History)            Assessment /Plan     For exam results, see Encounter Report.      ICD-10-CM ICD-9-CM    1. Primary open angle glaucoma (POAG) of both eyes, mild stage  H40.1131 365.11 Poor response to Latano - deja change to  dorzolamide-timolol 2-0.5% (COSOPT) 22.3-6.8 mg/mL ophthalmic solution BID OU     365.71    2. Family history of glaucoma  Z83.511 V19.11 + mother   3. Uncontrolled type 2 diabetes mellitus with diabetic cataract, with long-term current use of insulin  E11.36 250.52     Z79.4 366.41     E11.65 V58.67    4. Cortical senile cataract of left eye  H25.012 366.15    5. Cortical senile cataract of right eye  H25.011 366.15        D/c latanoprost OU  cosopt bid OU  Return to clinic 2 months  Could consider both drops in needed.

## 2022-06-13 ENCOUNTER — TELEPHONE (OUTPATIENT)
Dept: ADMINISTRATIVE | Facility: CLINIC | Age: 67
End: 2022-06-13
Payer: MEDICARE

## 2022-06-13 NOTE — TELEPHONE ENCOUNTER
Called pt, informed pt I was calling to remind pt of her in office EAWV on 6/14/22; pt stated she needed to reschedule due to a death in the family; appt rescheduled to 7/22/22 per pt request

## 2022-07-20 ENCOUNTER — TELEPHONE (OUTPATIENT)
Dept: ADMINISTRATIVE | Facility: CLINIC | Age: 67
End: 2022-07-20
Payer: MEDICARE

## 2022-07-22 ENCOUNTER — OFFICE VISIT (OUTPATIENT)
Dept: INTERNAL MEDICINE | Facility: CLINIC | Age: 67
End: 2022-07-22
Payer: MEDICARE

## 2022-07-22 VITALS
HEIGHT: 66 IN | DIASTOLIC BLOOD PRESSURE: 98 MMHG | SYSTOLIC BLOOD PRESSURE: 156 MMHG | HEART RATE: 100 BPM | RESPIRATION RATE: 18 BRPM | WEIGHT: 224.88 LBS | OXYGEN SATURATION: 94 % | BODY MASS INDEX: 36.14 KG/M2

## 2022-07-22 DIAGNOSIS — E78.00 PURE HYPERCHOLESTEROLEMIA: Chronic | ICD-10-CM

## 2022-07-22 DIAGNOSIS — G89.29 CHRONIC PAIN OF BOTH KNEES: ICD-10-CM

## 2022-07-22 DIAGNOSIS — T50.2X5A DIURETIC-INDUCED HYPOKALEMIA: ICD-10-CM

## 2022-07-22 DIAGNOSIS — I48.0 PAROXYSMAL ATRIAL FIBRILLATION: Chronic | ICD-10-CM

## 2022-07-22 DIAGNOSIS — E11.29 TYPE 2 DIABETES MELLITUS WITH MICROALBUMINURIA, WITH LONG-TERM CURRENT USE OF INSULIN: Chronic | ICD-10-CM

## 2022-07-22 DIAGNOSIS — I10 ESSENTIAL HYPERTENSION: Chronic | ICD-10-CM

## 2022-07-22 DIAGNOSIS — M25.561 CHRONIC PAIN OF BOTH KNEES: ICD-10-CM

## 2022-07-22 DIAGNOSIS — E87.6 DIURETIC-INDUCED HYPOKALEMIA: ICD-10-CM

## 2022-07-22 DIAGNOSIS — E66.01 CLASS 2 SEVERE OBESITY DUE TO EXCESS CALORIES WITH SERIOUS COMORBIDITY AND BODY MASS INDEX (BMI) OF 37.0 TO 37.9 IN ADULT: Chronic | ICD-10-CM

## 2022-07-22 DIAGNOSIS — Z00.00 ENCOUNTER FOR PREVENTIVE HEALTH EXAMINATION: Primary | ICD-10-CM

## 2022-07-22 DIAGNOSIS — E11.65 TYPE 2 DIABETES MELLITUS WITH HYPERGLYCEMIA, WITH LONG-TERM CURRENT USE OF INSULIN: ICD-10-CM

## 2022-07-22 DIAGNOSIS — Z79.4 TYPE 2 DIABETES MELLITUS WITH HYPERGLYCEMIA, WITH LONG-TERM CURRENT USE OF INSULIN: ICD-10-CM

## 2022-07-22 DIAGNOSIS — E78.2 MIXED HYPERLIPIDEMIA: ICD-10-CM

## 2022-07-22 DIAGNOSIS — R80.9 TYPE 2 DIABETES MELLITUS WITH MICROALBUMINURIA, WITH LONG-TERM CURRENT USE OF INSULIN: Chronic | ICD-10-CM

## 2022-07-22 DIAGNOSIS — Z79.4 TYPE 2 DIABETES MELLITUS WITH MICROALBUMINURIA, WITH LONG-TERM CURRENT USE OF INSULIN: Chronic | ICD-10-CM

## 2022-07-22 DIAGNOSIS — M25.562 CHRONIC PAIN OF BOTH KNEES: ICD-10-CM

## 2022-07-22 PROCEDURE — G0439 PPPS, SUBSEQ VISIT: HCPCS | Mod: S$GLB,,, | Performed by: NURSE PRACTITIONER

## 2022-07-22 PROCEDURE — 3080F DIAST BP >= 90 MM HG: CPT | Mod: CPTII,S$GLB,, | Performed by: NURSE PRACTITIONER

## 2022-07-22 PROCEDURE — 3077F PR MOST RECENT SYSTOLIC BLOOD PRESSURE >= 140 MM HG: ICD-10-PCS | Mod: CPTII,S$GLB,, | Performed by: NURSE PRACTITIONER

## 2022-07-22 PROCEDURE — 1159F PR MEDICATION LIST DOCUMENTED IN MEDICAL RECORD: ICD-10-PCS | Mod: CPTII,S$GLB,, | Performed by: NURSE PRACTITIONER

## 2022-07-22 PROCEDURE — 3061F PR NEG MICROALBUMINURIA RESULT DOCUMENTED/REVIEW: ICD-10-PCS | Mod: CPTII,S$GLB,, | Performed by: NURSE PRACTITIONER

## 2022-07-22 PROCEDURE — 3066F NEPHROPATHY DOC TX: CPT | Mod: CPTII,S$GLB,, | Performed by: NURSE PRACTITIONER

## 2022-07-22 PROCEDURE — 1101F PT FALLS ASSESS-DOCD LE1/YR: CPT | Mod: CPTII,S$GLB,, | Performed by: NURSE PRACTITIONER

## 2022-07-22 PROCEDURE — 3066F PR DOCUMENTATION OF TREATMENT FOR NEPHROPATHY: ICD-10-PCS | Mod: CPTII,S$GLB,, | Performed by: NURSE PRACTITIONER

## 2022-07-22 PROCEDURE — 1159F MED LIST DOCD IN RCRD: CPT | Mod: CPTII,S$GLB,, | Performed by: NURSE PRACTITIONER

## 2022-07-22 PROCEDURE — 1101F PR PT FALLS ASSESS DOC 0-1 FALLS W/OUT INJ PAST YR: ICD-10-PCS | Mod: CPTII,S$GLB,, | Performed by: NURSE PRACTITIONER

## 2022-07-22 PROCEDURE — 3008F PR BODY MASS INDEX (BMI) DOCUMENTED: ICD-10-PCS | Mod: CPTII,S$GLB,, | Performed by: NURSE PRACTITIONER

## 2022-07-22 PROCEDURE — 3288F PR FALLS RISK ASSESSMENT DOCUMENTED: ICD-10-PCS | Mod: CPTII,S$GLB,, | Performed by: NURSE PRACTITIONER

## 2022-07-22 PROCEDURE — 1160F PR REVIEW ALL MEDS BY PRESCRIBER/CLIN PHARMACIST DOCUMENTED: ICD-10-PCS | Mod: CPTII,S$GLB,, | Performed by: NURSE PRACTITIONER

## 2022-07-22 PROCEDURE — 99999 PR PBB SHADOW E&M-EST. PATIENT-LVL V: CPT | Mod: PBBFAC,,, | Performed by: NURSE PRACTITIONER

## 2022-07-22 PROCEDURE — 3044F HG A1C LEVEL LT 7.0%: CPT | Mod: CPTII,S$GLB,, | Performed by: NURSE PRACTITIONER

## 2022-07-22 PROCEDURE — 3072F LOW RISK FOR RETINOPATHY: CPT | Mod: CPTII,S$GLB,, | Performed by: NURSE PRACTITIONER

## 2022-07-22 PROCEDURE — 99999 PR PBB SHADOW E&M-EST. PATIENT-LVL V: ICD-10-PCS | Mod: PBBFAC,,, | Performed by: NURSE PRACTITIONER

## 2022-07-22 PROCEDURE — 3072F PR LOW RISK FOR RETINOPATHY: ICD-10-PCS | Mod: CPTII,S$GLB,, | Performed by: NURSE PRACTITIONER

## 2022-07-22 PROCEDURE — 3288F FALL RISK ASSESSMENT DOCD: CPT | Mod: CPTII,S$GLB,, | Performed by: NURSE PRACTITIONER

## 2022-07-22 PROCEDURE — 3077F SYST BP >= 140 MM HG: CPT | Mod: CPTII,S$GLB,, | Performed by: NURSE PRACTITIONER

## 2022-07-22 PROCEDURE — 1160F RVW MEDS BY RX/DR IN RCRD: CPT | Mod: CPTII,S$GLB,, | Performed by: NURSE PRACTITIONER

## 2022-07-22 PROCEDURE — 3061F NEG MICROALBUMINURIA REV: CPT | Mod: CPTII,S$GLB,, | Performed by: NURSE PRACTITIONER

## 2022-07-22 PROCEDURE — G0439 PR MEDICARE ANNUAL WELLNESS SUBSEQUENT VISIT: ICD-10-PCS | Mod: S$GLB,,, | Performed by: NURSE PRACTITIONER

## 2022-07-22 PROCEDURE — 3008F BODY MASS INDEX DOCD: CPT | Mod: CPTII,S$GLB,, | Performed by: NURSE PRACTITIONER

## 2022-07-22 PROCEDURE — 99499 RISK ADDL DX/OHS AUDIT: ICD-10-PCS | Mod: S$GLB,,, | Performed by: NURSE PRACTITIONER

## 2022-07-22 PROCEDURE — 99499 UNLISTED E&M SERVICE: CPT | Mod: S$GLB,,, | Performed by: NURSE PRACTITIONER

## 2022-07-22 PROCEDURE — 3044F PR MOST RECENT HEMOGLOBIN A1C LEVEL <7.0%: ICD-10-PCS | Mod: CPTII,S$GLB,, | Performed by: NURSE PRACTITIONER

## 2022-07-22 PROCEDURE — 3080F PR MOST RECENT DIASTOLIC BLOOD PRESSURE >= 90 MM HG: ICD-10-PCS | Mod: CPTII,S$GLB,, | Performed by: NURSE PRACTITIONER

## 2022-07-22 RX ORDER — INSULIN GLARGINE 100 [IU]/ML
30 INJECTION, SOLUTION SUBCUTANEOUS NIGHTLY
COMMUNITY
Start: 2022-05-16 | End: 2022-08-12 | Stop reason: SDUPTHER

## 2022-07-22 RX ORDER — LATANOPROST 50 UG/ML
SOLUTION/ DROPS OPHTHALMIC
COMMUNITY
Start: 2022-06-28

## 2022-07-22 NOTE — PATIENT INSTRUCTIONS
Counseling and Referral of Other Preventative  (Italic type indicates deductible and co-insurance are waived)    Patient Name: Lacey Calderón  Today's Date: 7/22/2022    Health Maintenance       Date Due Completion Date    Mammogram 10/20/2021 10/20/2020    COVID-19 Vaccine (4 - Booster for Moderna series) 05/24/2022 1/24/2022    Foot Exam 07/02/2022 7/2/2021 (Done)    Override on 7/2/2021: Done    Eye Exam 08/03/2022 8/3/2021    Hemoglobin A1c 07/31/2022 1/31/2022    Influenza Vaccine (1) 09/01/2022 11/18/2021    Diabetes Urine Screening 01/31/2023 1/31/2022    Lipid Panel 01/31/2023 1/31/2022    Low Dose Statin 02/14/2023 2/14/2022    DEXA Scan 10/20/2023 10/20/2020    Colorectal Cancer Screening 10/19/2028 10/19/2018 (Done)    Override on 10/19/2018: Done (She reports done at [facility]. Records requested.)    TETANUS VACCINE 02/23/2032 2/23/2022        No orders of the defined types were placed in this encounter.    The following information is provided to all patients.  This information is to help you find resources for any of the problems found today that may be affecting your health:                Living healthy guide: www.[facility].[place].gov      Understanding Diabetes: www.diabetes.org      Eating healthy: www.cdc.gov/healthyweight      CDC home safety checklist: www.cdc.gov/steadi/patient.html      Agency on Aging: www.goea.[place].gov      Alcoholics anonymous (AA): www.aa.org      Physical Activity: www.jaime.nih.gov/ik9vewu      Tobacco use: www.quitwithusla.org

## 2022-07-22 NOTE — PROGRESS NOTES
"  I offered to discuss advanced care planning, including how to pick a person who would make decisions for you if you were unable to make them for yourself, called a health care power of , and what kind of decisions you might make such as use of life sustaining treatments such as ventilators and tube feeding when faced with a life limiting illness recorded on a living will that they will need to know. (How you want to be cared for as you near the end of your natural life)     X Patient is interested in learning more about how to make advanced directives.  I provided them paperwork and offered to discuss this with them.    Lacey Calderón presented for a  Medicare AWV and comprehensive Health Risk Assessment today. The following components were reviewed and updated:    · Medical history  · Family History  · Social history  · Allergies and Current Medications  · Health Risk Assessment  · Health Maintenance  · Care Team     ** See Completed Assessments for Annual Wellness Visit within the encounter summary.**       The following assessments were completed:  · Living Situation  · CAGE  · Depression Screening  · Timed Get Up and Go  · Whisper Test  · Cognitive Function Screening  · Nutrition Screening  · ADL Screening  · PAQ Screening    Vitals:    07/22/22 0929   BP: (!) 156/98   Pulse: 100   Resp: 18   SpO2: (!) 94%   Weight: 102 kg (224 lb 13.9 oz)   Height: 5' 6" (1.676 m)     Body mass index is 36.29 kg/m².  Physical Exam  Constitutional:       Appearance: She is obese.   HENT:      Head: Normocephalic and atraumatic.   Eyes:      Conjunctiva/sclera: Conjunctivae normal.   Cardiovascular:      Rate and Rhythm: Normal rate and regular rhythm.      Heart sounds: Normal heart sounds.   Pulmonary:      Effort: Pulmonary effort is normal.      Breath sounds: Normal breath sounds.   Abdominal:      General: Bowel sounds are normal.      Palpations: Abdomen is soft.   Musculoskeletal:         General: Normal range of " motion.      Cervical back: Normal range of motion and neck supple.   Skin:     General: Skin is warm and dry.   Neurological:      Mental Status: She is alert and oriented to person, place, and time.           Diagnoses and health risks identified today and associated recommendations/orders:    1. Encounter for preventive health examination  Completed today. Discussed due covid booster    2. Essential hypertension  BP elevated today. Pt states she did not take her BP meds today, will take when she arrives home. BP usually 120-130s/80s. Asymptomatic    3. Pure hypercholesterolemia  Stable keep current regime    5. Paroxysmal atrial fibrillation  Stable f/u with PCP and cards    6. Diuretic-induced hypokalemia  Needs recheck. Last 3.2 in Jan. asymptomatic    7. Class 2 severe obesity due to excess calories with serious comorbidity and body mass index (BMI) of 37.0 to 37.9 in adult  This problem is not controlled. F.u with PCP to discuss further    8. Type 2 diabetes mellitus with microalbuminuria, with long-term current use of insulin  Stable/controlled. No acute issues    9. Type 2 diabetes mellitus with hyperglycemia, with long-term current use of insulin  Stable/controlled. F/u with PCP    10. Chronic pain of both knees  Stable. F.u w PCP      Provided Lacey with a 5-10 year written screening schedule and personal prevention plan. Recommendations were developed using the USPSTF age appropriate recommendations. Education, counseling, and referrals were provided as needed. After Visit Summary printed and given to patient which includes a list of additional screenings\tests needed.    Follow up with PCP and specialists as scheduled  New labs/tests ordered today:  Upcoming health maintenance scheduled:  HRA follow up in 1 year    Mary Jo Garcia NP

## 2022-08-03 ENCOUNTER — OFFICE VISIT (OUTPATIENT)
Dept: OPHTHALMOLOGY | Facility: CLINIC | Age: 67
End: 2022-08-03
Payer: MEDICARE

## 2022-08-03 DIAGNOSIS — H40.1131 PRIMARY OPEN ANGLE GLAUCOMA (POAG) OF BOTH EYES, MILD STAGE: Primary | ICD-10-CM

## 2022-08-03 DIAGNOSIS — H25.011 CORTICAL SENILE CATARACT OF RIGHT EYE: ICD-10-CM

## 2022-08-03 DIAGNOSIS — H25.012 CORTICAL SENILE CATARACT OF LEFT EYE: ICD-10-CM

## 2022-08-03 DIAGNOSIS — Z83.511 FAMILY HISTORY OF GLAUCOMA: ICD-10-CM

## 2022-08-03 PROCEDURE — 3044F PR MOST RECENT HEMOGLOBIN A1C LEVEL <7.0%: ICD-10-PCS | Mod: CPTII,S$GLB,, | Performed by: OPHTHALMOLOGY

## 2022-08-03 PROCEDURE — 3061F NEG MICROALBUMINURIA REV: CPT | Mod: CPTII,S$GLB,, | Performed by: OPHTHALMOLOGY

## 2022-08-03 PROCEDURE — 1159F MED LIST DOCD IN RCRD: CPT | Mod: CPTII,S$GLB,, | Performed by: OPHTHALMOLOGY

## 2022-08-03 PROCEDURE — 1159F PR MEDICATION LIST DOCUMENTED IN MEDICAL RECORD: ICD-10-PCS | Mod: CPTII,S$GLB,, | Performed by: OPHTHALMOLOGY

## 2022-08-03 PROCEDURE — 3044F HG A1C LEVEL LT 7.0%: CPT | Mod: CPTII,S$GLB,, | Performed by: OPHTHALMOLOGY

## 2022-08-03 PROCEDURE — 1160F RVW MEDS BY RX/DR IN RCRD: CPT | Mod: CPTII,S$GLB,, | Performed by: OPHTHALMOLOGY

## 2022-08-03 PROCEDURE — 1160F PR REVIEW ALL MEDS BY PRESCRIBER/CLIN PHARMACIST DOCUMENTED: ICD-10-PCS | Mod: CPTII,S$GLB,, | Performed by: OPHTHALMOLOGY

## 2022-08-03 PROCEDURE — 3066F PR DOCUMENTATION OF TREATMENT FOR NEPHROPATHY: ICD-10-PCS | Mod: CPTII,S$GLB,, | Performed by: OPHTHALMOLOGY

## 2022-08-03 PROCEDURE — 3066F NEPHROPATHY DOC TX: CPT | Mod: CPTII,S$GLB,, | Performed by: OPHTHALMOLOGY

## 2022-08-03 PROCEDURE — 3061F PR NEG MICROALBUMINURIA RESULT DOCUMENTED/REVIEW: ICD-10-PCS | Mod: CPTII,S$GLB,, | Performed by: OPHTHALMOLOGY

## 2022-08-03 PROCEDURE — 92012 PR EYE EXAM, EST PATIENT,INTERMED: ICD-10-PCS | Mod: S$GLB,,, | Performed by: OPHTHALMOLOGY

## 2022-08-03 PROCEDURE — 92012 INTRM OPH EXAM EST PATIENT: CPT | Mod: S$GLB,,, | Performed by: OPHTHALMOLOGY

## 2022-08-03 PROCEDURE — 99999 PR PBB SHADOW E&M-EST. PATIENT-LVL III: CPT | Mod: PBBFAC,,, | Performed by: OPHTHALMOLOGY

## 2022-08-03 PROCEDURE — 99999 PR PBB SHADOW E&M-EST. PATIENT-LVL III: ICD-10-PCS | Mod: PBBFAC,,, | Performed by: OPHTHALMOLOGY

## 2022-08-03 RX ORDER — DORZOLAMIDE HYDROCHLORIDE AND TIMOLOL MALEATE 20; 5 MG/ML; MG/ML
1 SOLUTION/ DROPS OPHTHALMIC EVERY 12 HOURS
Qty: 10 ML | Refills: 6 | Status: SHIPPED | OUTPATIENT
Start: 2022-08-03 | End: 2023-02-14 | Stop reason: SDUPTHER

## 2022-08-03 NOTE — PROGRESS NOTES
HPI     Glaucoma     Comments: 2 month IOP check    Patient states no changes in VA denies any pain or irritation and using   drop as directed, will need refills sent to the pharmacy in a 90 day   supply.              Comments     DMII 5/2021   HTN  CATARACTS  SCOAG by c/d and IOP  Gcl : 29-26--3/30/2022    [facility] H/O Glaucoma - Mom  Pt lost mom- 3/2022, she was MGM Pt (Ana Montoya)      Cosopt BID OU          Last edited by Yessica David, Patient Care Assistant on 8/3/2022 11:13   AM. (History)            Assessment /Plan     For exam results, see Encounter Report.      ICD-10-CM ICD-9-CM    1. Primary open angle glaucoma (POAG) of both eyes, mild stage  H40.1131 365.11 Doing well - intraocular pressure is within acceptable range relative to target pressure with no evidence of progression.   Continue current treatment.  Reviewed importance of continued compliance with treatment and follow up.      365.71    2. Family history of glaucoma  Z83.511 V19.11    3. Uncontrolled type 2 diabetes mellitus with diabetic cataract, with long-term current use of insulin  E11.36 250.52     Z79.4 366.41     E11.65 V58.67    4. Cortical senile cataract of left eye  H25.012 366.15    5. Cortical senile cataract of right eye  H25.011 366.15        RETURN TO CLINIC 3-4 months IOP check    Drops:  Cosopt BID OU

## 2022-08-11 ENCOUNTER — TELEPHONE (OUTPATIENT)
Dept: INTERNAL MEDICINE | Facility: CLINIC | Age: 67
End: 2022-08-11
Payer: MEDICARE

## 2022-08-11 DIAGNOSIS — Z79.4 TYPE 2 DIABETES MELLITUS WITH MICROALBUMINURIA, WITH LONG-TERM CURRENT USE OF INSULIN: Primary | ICD-10-CM

## 2022-08-11 DIAGNOSIS — I10 ESSENTIAL HYPERTENSION: ICD-10-CM

## 2022-08-11 DIAGNOSIS — E11.29 TYPE 2 DIABETES MELLITUS WITH MICROALBUMINURIA, WITH LONG-TERM CURRENT USE OF INSULIN: Primary | ICD-10-CM

## 2022-08-11 DIAGNOSIS — R80.9 TYPE 2 DIABETES MELLITUS WITH MICROALBUMINURIA, WITH LONG-TERM CURRENT USE OF INSULIN: Primary | ICD-10-CM

## 2022-08-11 DIAGNOSIS — E78.00 PURE HYPERCHOLESTEROLEMIA: ICD-10-CM

## 2022-08-11 DIAGNOSIS — Z12.31 BREAST CANCER SCREENING BY MAMMOGRAM: ICD-10-CM

## 2022-08-11 NOTE — TELEPHONE ENCOUNTER
"----- Message from Jackie Jones sent at 8/11/2022  2:43 PM CDT -----  Contact: self/[phone]  Patient is calling to get refill oninsulin syringe-needle U-100 0.5 mL 31 gauge x 5/16" Alan, please send to   [facility]  [place] - [address]  [address] [place] [address]  Phone: [phone] Fax: [phone]            "

## 2022-08-11 NOTE — TELEPHONE ENCOUNTER
Requested Prescriptions      No prescriptions requested or ordered in this encounter     #LMRX  CONCERNS:  Her BP was uncontrolled at her last appointment.  It appears she it not actively participating in digital medicine program(s).  She is overdue important labs, important health-maintenance interventions, BP measurement to assess HTN control and a follow-up appointment with me.    TO MY TEAM:     Verify pharmacy and current dosing of Lantus and submit high-priority refill request.   Explain: Labs and Appointment required for more refills.   Schedule her for labs (listed below) and Nurse Visit blood pressure check ASAP.   Schedule mammogram (can be on same day as her upcoming appointment with me).   Tell Lacey I look forward to seeing her at her next appointment on 10/17/2022.    Thanks!    -LM    Orders Placed This Encounter   Procedures    Mammo Digital Screening Bilat w/ Karel    Hemoglobin A1C    Comprehensive Metabolic Panel    Lipid Panel    Microalbumin/Creatinine Ratio, Urine       Future Appointments   Date Time Provider Department Center   10/17/2022 10:30 AM JAKE Merrill MD [facility]  [place]   12/9/2022  9:45 AM Tanner Haque MD [facility] \[facility]\""       HEALTH MAINTENANCE INTERVENTIONS - DUE OR DUE SOON  Health Maintenance Due   Topic Date Due    Mammogram  10/20/2021    COVID-19 Vaccine (4 - Booster for Moderna series) 05/24/2022    Hemoglobin A1c  07/31/2022        BP Readings from Last 2 Encounters:   07/22/22 (!) 156/98   02/02/22 120/70      Last 5 Patient Entered Readings                Current 30 Day Average:   Recent Readings        SBP (mmHg)        DBP (mmHg)        Pulse                       Lab Results   Component Value Date    HGBA1C 6.3 (H) 01/31/2022     Last 6 Patient Entered Readings                                          Most Recent A1c:     There is no flowsheet data to display.        LAST ENCOUNTER WITH ME:  2/2/2022

## 2022-08-11 NOTE — TELEPHONE ENCOUNTER
Spoke with patient and she stated that she is out of her Lantus. I informed her that I will send an urgent request to Dr. Merrill.

## 2022-08-12 NOTE — TELEPHONE ENCOUNTER
Spoke with patient and she stated that she is taking 30 mg of Lantus daily. We scheduled her fasting labs for Dr. Merrill and nurse visit BP check on 8/18/22 and her mammogram on 8/31/22.

## 2022-08-12 NOTE — TELEPHONE ENCOUNTER
----- Message from Dalia Orozco sent at 8/12/2022 10:26 AM CDT -----  Contact: Patient  Lacey Calderón would like a call back at [phone], in regards to her insulin. Pt states that the refill for her LANTUS SOLOSTAR U-100 INSULIN glargine 100 units/mL was denied. Pt states that she has been without the medication,and she would like to know if it will affect her health. She would like a call back as soon as possible.

## 2022-08-12 NOTE — TELEPHONE ENCOUNTER
Care Due:                  Date            Visit Type   Department     Provider  --------------------------------------------------------------------------------                                EP -                              PRIMARY      [facility] INTERNAL  Last Visit: 02-      CARE ([facility])   VELMA Merrill                              EP -                              PRIMARY      [facility] INTERNAL  Next Visit: 10-      CARE ([facility])   VELMA Merrill                                                            Last  Test          Frequency    Reason                     Performed    Due Date  --------------------------------------------------------------------------------    HBA1C.......  6 months...  insulin, metFORMIN.......  01- 07-    Health [facility] Embedded Care Gaps. Reference number: [id number]. 8/12/2022   12:50:28 PM CDT

## 2022-08-13 RX ORDER — INSULIN GLARGINE 100 [IU]/ML
30 INJECTION, SOLUTION SUBCUTANEOUS NIGHTLY
Qty: 30 ML | Refills: 0 | Status: SHIPPED | OUTPATIENT
Start: 2022-08-13 | End: 2022-10-17 | Stop reason: SDUPTHER

## 2022-08-13 NOTE — TELEPHONE ENCOUNTER
REFILL APPROVED. Will address further refills at upcoming appointment with me listed below.  Requested Prescriptions   Pending Prescriptions Disp Refills    LANTUS SOLOSTAR U-100 INSULIN glargine 100 units/mL SubQ pen 30 mL 0     Sig: Inject 30 Units into the skin nightly.    #LMRX   --------------------------------  Future Appointments   Date Time Provider Department Center   8/18/2022  9:00 AM SPECIMEN, [facility] SPECLAB [facility]   8/18/2022  9:10 AM LABORATORY, [facility] LAB [facility]   8/18/2022  9:30 AM NURSE, [facility] INTERNAL MEDICINE [facility]   8/31/2022 11:20 AM [facility] MAMMO1-SCR [facility] MAMMO [facility]   10/17/2022 10:30 AM JAKE Merrill MD [facility]   12/9/2022  9:45 AM Tanner Haque MD [facility]

## 2022-08-18 ENCOUNTER — LAB VISIT (OUTPATIENT)
Dept: LAB | Facility: HOSPITAL | Age: 67
End: 2022-08-18
Attending: FAMILY MEDICINE
Payer: MEDICARE

## 2022-08-18 ENCOUNTER — TELEPHONE (OUTPATIENT)
Dept: INTERNAL MEDICINE | Facility: CLINIC | Age: 67
End: 2022-08-18

## 2022-08-18 ENCOUNTER — CLINICAL SUPPORT (OUTPATIENT)
Dept: INTERNAL MEDICINE | Facility: CLINIC | Age: 67
End: 2022-08-18
Payer: MEDICARE

## 2022-08-18 VITALS — DIASTOLIC BLOOD PRESSURE: 92 MMHG | HEART RATE: 82 BPM | OXYGEN SATURATION: 98 % | SYSTOLIC BLOOD PRESSURE: 152 MMHG

## 2022-08-18 DIAGNOSIS — I48.0 PAROXYSMAL ATRIAL FIBRILLATION: Chronic | ICD-10-CM

## 2022-08-18 DIAGNOSIS — E11.29 TYPE 2 DIABETES MELLITUS WITH MICROALBUMINURIA, WITH LONG-TERM CURRENT USE OF INSULIN: ICD-10-CM

## 2022-08-18 DIAGNOSIS — R80.9 TYPE 2 DIABETES MELLITUS WITH MICROALBUMINURIA, WITH LONG-TERM CURRENT USE OF INSULIN: ICD-10-CM

## 2022-08-18 DIAGNOSIS — Z79.4 TYPE 2 DIABETES MELLITUS WITH MICROALBUMINURIA, WITH LONG-TERM CURRENT USE OF INSULIN: ICD-10-CM

## 2022-08-18 DIAGNOSIS — I10 ESSENTIAL HYPERTENSION: Chronic | ICD-10-CM

## 2022-08-18 LAB
ALBUMIN/CREAT UR: 6 UG/MG (ref 0–30)
CREAT UR-MCNC: 298 MG/DL (ref 15–325)
MICROALBUMIN UR DL<=1MG/L-MCNC: 18 UG/ML

## 2022-08-18 PROCEDURE — 82570 ASSAY OF URINE CREATININE: CPT | Performed by: FAMILY MEDICINE

## 2022-08-18 PROCEDURE — 82043 UR ALBUMIN QUANTITATIVE: CPT | Performed by: FAMILY MEDICINE

## 2022-08-18 PROCEDURE — 99999 PR PBB SHADOW E&M-EST. PATIENT-LVL I: ICD-10-PCS | Mod: PBBFAC,,,

## 2022-08-18 PROCEDURE — 99999 PR PBB SHADOW E&M-EST. PATIENT-LVL I: CPT | Mod: PBBFAC,,,

## 2022-08-18 RX ORDER — VALSARTAN AND HYDROCHLOROTHIAZIDE 320; 25 MG/1; MG/1
1 TABLET, FILM COATED ORAL DAILY
Qty: 30 TABLET | Refills: 0 | Status: SHIPPED | OUTPATIENT
Start: 2022-08-18 | End: 2022-09-29 | Stop reason: SDUPTHER

## 2022-08-18 RX ORDER — METOPROLOL SUCCINATE 25 MG/1
25 TABLET, EXTENDED RELEASE ORAL NIGHTLY
Qty: 90 TABLET | Refills: 0 | Status: SHIPPED | OUTPATIENT
Start: 2022-08-18 | End: 2022-11-05 | Stop reason: ALTCHOICE

## 2022-08-18 RX ORDER — AMLODIPINE BESYLATE 10 MG/1
10 TABLET ORAL DAILY
Qty: 30 TABLET | Refills: 0 | Status: SHIPPED | OUTPATIENT
Start: 2022-08-18 | End: 2022-09-29 | Stop reason: SDUPTHER

## 2022-08-18 NOTE — PROGRESS NOTES
Patient here for blood pressure check patient blood pressure is 152/92 pulse is 82 oxygen is 98 theophylline provider was notified the is no known illness patient states she is feeling fine.Is there anything else needing be done.//LB

## 2022-08-18 NOTE — PROGRESS NOTES
TO MY TEAM:    Explain med changed below.   Please schedule Lacey an in-office appointment with my partner, SAVANNAH Vasquez, in 2 weeks for re-evaluation.    Thanks!  --------------------------------------------------------------------------------  --------------------------------------------------------------------------------  JUAN     NEW MEDICATIONS STARTED  New Medications Ordered This Encounter   Medications    amLODIPine (NORVASC) 10 MG tablet     Sig: Take 1 tablet (10 mg total) by mouth once daily.     Dispense:  30 tablet     Refill:  0     NOTE DOSE CHANGE. DISCONTINUE any prescription for this drug issued prior to 08/18/2022. Dispense only 30-day QTY. Do not request 90-day QTY. Blood pressure check required for further refills.    valsartan-hydrochlorothiazide (DIOVAN-HCT) 320-25 mg per tablet     Sig: Take 1 tablet by mouth once daily.     Dispense:  30 tablet     Refill:  0     This REPLACES losartan-hydrochlorothiazide. DISCONTINUE any existing prescription for losartan-hydrochlorothiazide. Dispense only 30-day QTY. Do not request 90-day QTY. BP check required for refills.      MEDICATIONS STOPPED  Medications Discontinued During This Encounter   Medication Reason    amLODIPine (NORVASC) 5 MG tablet Dose adjustment    losartan-hydrochlorothiazide 100-25 mg (HYZAAR) 100-25 mg per tablet Alternate therapy       MEDICATION CONTINUED  Medications Continued (Unchanged) This Encounter   Medications    metoprolol succinate (TOPROL-XL) 25 MG 24 hr tablet     Sig: Take 1 tablet (25 mg total) by mouth every evening.     Dispense:  90 tablet     Refill:  0       BP Readings from Last 5 Encounters:   08/18/22 (!) 152/92   07/22/22 (!) 156/98   02/02/22 120/70   10/27/21 126/74   10/18/21 (!) 170/110

## 2022-08-18 NOTE — LETTER
TO MY TEAM:    Explain med changed below.   Please schedule Lacey an in-office appointment with my partner, SAVANNAH Vasquez, in 2 weeks for re-evaluation.    Thanks!  --------------------------------------------------------------------------------  --------------------------------------------------------------------------------  JUAN     NEW MEDICATIONS STARTED  New Medications Ordered This Encounter   Medications    amLODIPine (NORVASC) 10 MG tablet     Sig: Take 1 tablet (10 mg total) by mouth once daily.     Dispense:  30 tablet     Refill:  0     NOTE DOSE CHANGE. DISCONTINUE any prescription for this drug issued prior to 08/18/2022. Dispense only 30-day QTY. Do not request 90-day QTY. Blood pressure check required for further refills.    valsartan-hydrochlorothiazide (DIOVAN-HCT) 320-25 mg per tablet     Sig: Take 1 tablet by mouth once daily.     Dispense:  30 tablet     Refill:  0     This REPLACES losartan-hydrochlorothiazide. DISCONTINUE any existing prescription for losartan-hydrochlorothiazide. Dispense only 30-day QTY. Do not request 90-day QTY. BP check required for refills.      MEDICATIONS STOPPED  Medications Discontinued During This Encounter   Medication Reason    amLODIPine (NORVASC) 5 MG tablet Dose adjustment    losartan-hydrochlorothiazide 100-25 mg (HYZAAR) 100-25 mg per tablet Alternate therapy       NEW MEDICATION STARTED  Medications Continued (Unchanged) This Encounter   Medications    metoprolol succinate (TOPROL-XL) 25 MG 24 hr tablet     Sig: Take 1 tablet (25 mg total) by mouth every evening.     Dispense:  90 tablet     Refill:  0

## 2022-08-31 ENCOUNTER — HOSPITAL ENCOUNTER (OUTPATIENT)
Dept: RADIOLOGY | Facility: HOSPITAL | Age: 67
Discharge: HOME | End: 2022-08-31
Attending: FAMILY MEDICINE
Payer: MEDICARE

## 2022-08-31 VITALS — WEIGHT: 224 LBS | BODY MASS INDEX: 36 KG/M2 | HEIGHT: 66 IN

## 2022-08-31 DIAGNOSIS — Z12.31 BREAST CANCER SCREENING BY MAMMOGRAM: ICD-10-CM

## 2022-08-31 PROCEDURE — 77067 SCR MAMMO BI INCL CAD: CPT | Mod: TC

## 2022-08-31 PROCEDURE — 77067 MAMMO DIGITAL SCREENING BILAT WITH TOMO: ICD-10-PCS | Mod: 26,,, | Performed by: RADIOLOGY

## 2022-08-31 PROCEDURE — 77063 MAMMO DIGITAL SCREENING BILAT WITH TOMO: ICD-10-PCS | Mod: 26,,, | Performed by: RADIOLOGY

## 2022-08-31 PROCEDURE — 77067 SCR MAMMO BI INCL CAD: CPT | Mod: 26,,, | Performed by: RADIOLOGY

## 2022-08-31 PROCEDURE — 77063 BREAST TOMOSYNTHESIS BI: CPT | Mod: TC

## 2022-08-31 PROCEDURE — 77063 BREAST TOMOSYNTHESIS BI: CPT | Mod: 26,,, | Performed by: RADIOLOGY

## 2022-09-20 ENCOUNTER — TELEPHONE (OUTPATIENT)
Dept: INTERNAL MEDICINE | Facility: CLINIC | Age: 67
End: 2022-09-20
Payer: MEDICARE

## 2022-09-21 ENCOUNTER — TELEPHONE (OUTPATIENT)
Dept: INTERNAL MEDICINE | Facility: CLINIC | Age: 67
End: 2022-09-21
Payer: MEDICARE

## 2022-09-21 ENCOUNTER — DOCUMENTATION ONLY (OUTPATIENT)
Dept: INTERNAL MEDICINE | Facility: CLINIC | Age: 67
End: 2022-09-21
Payer: MEDICARE

## 2022-09-21 NOTE — TELEPHONE ENCOUNTER
Spoke with patient and scheduled her to see Clarita Bhatti NP for a pre-op clearance on 9/29/22. She verbalized understanding.

## 2022-09-21 NOTE — TELEPHONE ENCOUNTER
"aLcey Calderón (MRN [id number])  Document Received: "Medical Clearance for Dental Treatment"  Reason unable to complete/sign: (1) She's overdue for F/U with me and her cardiologist; (2) Her BP is uncontrolled.     TO MY TEAM:   Please schedule Lacey an appointment with my partner, SAVANNAH Vasquez, or with me to address this.  The appointment MUST be an in-office visit (not a virtual visit).  Also schedule (or instruct her to schedule) appointment with her cardiologist.  (Original document returned to my staff.)    Thanks!    BP Readings from Last 2 Encounters:   08/18/22 (!) 152/92   07/22/22 (!) 156/98     Future Appointments   Date Time Provider Department Center   [facility]2022 10[facility] [place]   12/9/2022  9:45 AM Tanner Haque MD [facility] [place]      "

## 2022-09-29 ENCOUNTER — OFFICE VISIT (OUTPATIENT)
Dept: INTERNAL MEDICINE | Facility: CLINIC | Age: 67
End: 2022-09-29
Payer: MEDICARE

## 2022-09-29 ENCOUNTER — LAB VISIT (OUTPATIENT)
Dept: LAB | Facility: HOSPITAL | Age: 67
End: 2022-09-29
Attending: NURSE PRACTITIONER
Payer: MEDICARE

## 2022-09-29 VITALS
WEIGHT: 226.44 LBS | TEMPERATURE: 98 F | DIASTOLIC BLOOD PRESSURE: 92 MMHG | SYSTOLIC BLOOD PRESSURE: 148 MMHG | HEIGHT: 66 IN | BODY MASS INDEX: 36.39 KG/M2

## 2022-09-29 DIAGNOSIS — R80.9 TYPE 2 DIABETES MELLITUS WITH MICROALBUMINURIA, WITH LONG-TERM CURRENT USE OF INSULIN: Chronic | ICD-10-CM

## 2022-09-29 DIAGNOSIS — Z79.4 TYPE 2 DIABETES MELLITUS WITH MICROALBUMINURIA, WITH LONG-TERM CURRENT USE OF INSULIN: Chronic | ICD-10-CM

## 2022-09-29 DIAGNOSIS — I48.0 PAROXYSMAL ATRIAL FIBRILLATION: Chronic | ICD-10-CM

## 2022-09-29 DIAGNOSIS — E78.00 PURE HYPERCHOLESTEROLEMIA: Chronic | ICD-10-CM

## 2022-09-29 DIAGNOSIS — I10 ESSENTIAL HYPERTENSION: Chronic | ICD-10-CM

## 2022-09-29 DIAGNOSIS — E66.01 CLASS 2 SEVERE OBESITY DUE TO EXCESS CALORIES WITH SERIOUS COMORBIDITY AND BODY MASS INDEX (BMI) OF 35.0 TO 35.9 IN ADULT: ICD-10-CM

## 2022-09-29 DIAGNOSIS — R74.8 ELEVATED CPK: ICD-10-CM

## 2022-09-29 DIAGNOSIS — Z01.818 PREOP EXAM FOR INTERNAL MEDICINE: Primary | ICD-10-CM

## 2022-09-29 DIAGNOSIS — E87.6 DIURETIC-INDUCED HYPOKALEMIA: ICD-10-CM

## 2022-09-29 DIAGNOSIS — E11.29 TYPE 2 DIABETES MELLITUS WITH MICROALBUMINURIA, WITH LONG-TERM CURRENT USE OF INSULIN: Chronic | ICD-10-CM

## 2022-09-29 DIAGNOSIS — Z79.4 TYPE 2 DIABETES MELLITUS WITH HYPERGLYCEMIA, WITH LONG-TERM CURRENT USE OF INSULIN: ICD-10-CM

## 2022-09-29 DIAGNOSIS — E11.65 TYPE 2 DIABETES MELLITUS WITH HYPERGLYCEMIA, WITH LONG-TERM CURRENT USE OF INSULIN: ICD-10-CM

## 2022-09-29 DIAGNOSIS — T50.2X5A DIURETIC-INDUCED HYPOKALEMIA: ICD-10-CM

## 2022-09-29 PROBLEM — E66.9 CLASS 1 OBESITY IN ADULT: Status: RESOLVED | Noted: 2021-05-24 | Resolved: 2022-09-29

## 2022-09-29 PROBLEM — E78.2 MIXED HYPERLIPIDEMIA: Status: RESOLVED | Noted: 2021-05-24 | Resolved: 2022-09-29

## 2022-09-29 PROBLEM — E66.811 CLASS 1 OBESITY IN ADULT: Status: RESOLVED | Noted: 2021-05-24 | Resolved: 2022-09-29

## 2022-09-29 LAB
ANION GAP SERPL CALC-SCNC: 9 MMOL/L (ref 8–16)
BUN SERPL-MCNC: 17 MG/DL (ref 8–23)
CALCIUM SERPL-MCNC: 10.5 MG/DL (ref 8.7–10.5)
CHLORIDE SERPL-SCNC: 105 MMOL/L (ref 95–110)
CK SERPL-CCNC: 111 U/L (ref 20–180)
CO2 SERPL-SCNC: 26 MMOL/L (ref 23–29)
CREAT SERPL-MCNC: 0.8 MG/DL (ref 0.5–1.4)
EST. GFR  (NO RACE VARIABLE): >60 ML/MIN/1.73 M^2
GLUCOSE SERPL-MCNC: 94 MG/DL (ref 70–110)
POTASSIUM SERPL-SCNC: 4.4 MMOL/L (ref 3.5–5.1)
SODIUM SERPL-SCNC: 140 MMOL/L (ref 136–145)

## 2022-09-29 PROCEDURE — 3044F PR MOST RECENT HEMOGLOBIN A1C LEVEL <7.0%: ICD-10-PCS | Mod: CPTII,S$GLB,, | Performed by: NURSE PRACTITIONER

## 2022-09-29 PROCEDURE — 3008F PR BODY MASS INDEX (BMI) DOCUMENTED: ICD-10-PCS | Mod: CPTII,S$GLB,, | Performed by: NURSE PRACTITIONER

## 2022-09-29 PROCEDURE — 99214 PR OFFICE/OUTPT VISIT, EST, LEVL IV, 30-39 MIN: ICD-10-PCS | Mod: S$GLB,,, | Performed by: NURSE PRACTITIONER

## 2022-09-29 PROCEDURE — 36415 COLL VENOUS BLD VENIPUNCTURE: CPT | Performed by: NURSE PRACTITIONER

## 2022-09-29 PROCEDURE — 3072F PR LOW RISK FOR RETINOPATHY: ICD-10-PCS | Mod: CPTII,S$GLB,, | Performed by: NURSE PRACTITIONER

## 2022-09-29 PROCEDURE — 3080F PR MOST RECENT DIASTOLIC BLOOD PRESSURE >= 90 MM HG: ICD-10-PCS | Mod: CPTII,S$GLB,, | Performed by: NURSE PRACTITIONER

## 2022-09-29 PROCEDURE — 3077F PR MOST RECENT SYSTOLIC BLOOD PRESSURE >= 140 MM HG: ICD-10-PCS | Mod: CPTII,S$GLB,, | Performed by: NURSE PRACTITIONER

## 2022-09-29 PROCEDURE — 3077F SYST BP >= 140 MM HG: CPT | Mod: CPTII,S$GLB,, | Performed by: NURSE PRACTITIONER

## 2022-09-29 PROCEDURE — 99999 PR PBB SHADOW E&M-EST. PATIENT-LVL V: ICD-10-PCS | Mod: PBBFAC,,, | Performed by: NURSE PRACTITIONER

## 2022-09-29 PROCEDURE — 82550 ASSAY OF CK (CPK): CPT | Performed by: NURSE PRACTITIONER

## 2022-09-29 PROCEDURE — 1101F PR PT FALLS ASSESS DOC 0-1 FALLS W/OUT INJ PAST YR: ICD-10-PCS | Mod: CPTII,S$GLB,, | Performed by: NURSE PRACTITIONER

## 2022-09-29 PROCEDURE — 1126F AMNT PAIN NOTED NONE PRSNT: CPT | Mod: CPTII,S$GLB,, | Performed by: NURSE PRACTITIONER

## 2022-09-29 PROCEDURE — 3066F PR DOCUMENTATION OF TREATMENT FOR NEPHROPATHY: ICD-10-PCS | Mod: CPTII,S$GLB,, | Performed by: NURSE PRACTITIONER

## 2022-09-29 PROCEDURE — 3061F PR NEG MICROALBUMINURIA RESULT DOCUMENTED/REVIEW: ICD-10-PCS | Mod: CPTII,S$GLB,, | Performed by: NURSE PRACTITIONER

## 2022-09-29 PROCEDURE — 3066F NEPHROPATHY DOC TX: CPT | Mod: CPTII,S$GLB,, | Performed by: NURSE PRACTITIONER

## 2022-09-29 PROCEDURE — 3080F DIAST BP >= 90 MM HG: CPT | Mod: CPTII,S$GLB,, | Performed by: NURSE PRACTITIONER

## 2022-09-29 PROCEDURE — 80048 BASIC METABOLIC PNL TOTAL CA: CPT | Performed by: NURSE PRACTITIONER

## 2022-09-29 PROCEDURE — 3008F BODY MASS INDEX DOCD: CPT | Mod: CPTII,S$GLB,, | Performed by: NURSE PRACTITIONER

## 2022-09-29 PROCEDURE — 3061F NEG MICROALBUMINURIA REV: CPT | Mod: CPTII,S$GLB,, | Performed by: NURSE PRACTITIONER

## 2022-09-29 PROCEDURE — 1159F MED LIST DOCD IN RCRD: CPT | Mod: CPTII,S$GLB,, | Performed by: NURSE PRACTITIONER

## 2022-09-29 PROCEDURE — 3288F PR FALLS RISK ASSESSMENT DOCUMENTED: ICD-10-PCS | Mod: CPTII,S$GLB,, | Performed by: NURSE PRACTITIONER

## 2022-09-29 PROCEDURE — 1101F PT FALLS ASSESS-DOCD LE1/YR: CPT | Mod: CPTII,S$GLB,, | Performed by: NURSE PRACTITIONER

## 2022-09-29 PROCEDURE — 1126F PR PAIN SEVERITY QUANTIFIED, NO PAIN PRESENT: ICD-10-PCS | Mod: CPTII,S$GLB,, | Performed by: NURSE PRACTITIONER

## 2022-09-29 PROCEDURE — 3072F LOW RISK FOR RETINOPATHY: CPT | Mod: CPTII,S$GLB,, | Performed by: NURSE PRACTITIONER

## 2022-09-29 PROCEDURE — 1159F PR MEDICATION LIST DOCUMENTED IN MEDICAL RECORD: ICD-10-PCS | Mod: CPTII,S$GLB,, | Performed by: NURSE PRACTITIONER

## 2022-09-29 PROCEDURE — 99999 PR PBB SHADOW E&M-EST. PATIENT-LVL V: CPT | Mod: PBBFAC,,, | Performed by: NURSE PRACTITIONER

## 2022-09-29 PROCEDURE — 3044F HG A1C LEVEL LT 7.0%: CPT | Mod: CPTII,S$GLB,, | Performed by: NURSE PRACTITIONER

## 2022-09-29 PROCEDURE — 3288F FALL RISK ASSESSMENT DOCD: CPT | Mod: CPTII,S$GLB,, | Performed by: NURSE PRACTITIONER

## 2022-09-29 PROCEDURE — 99214 OFFICE O/P EST MOD 30 MIN: CPT | Mod: S$GLB,,, | Performed by: NURSE PRACTITIONER

## 2022-09-29 RX ORDER — AMLODIPINE BESYLATE 10 MG/1
10 TABLET ORAL DAILY
Qty: 30 TABLET | Refills: 0 | Status: SHIPPED | OUTPATIENT
Start: 2022-09-29 | End: 2022-11-09 | Stop reason: SDUPTHER

## 2022-09-29 RX ORDER — VALSARTAN AND HYDROCHLOROTHIAZIDE 320; 25 MG/1; MG/1
1 TABLET, FILM COATED ORAL DAILY
Qty: 30 TABLET | Refills: 0 | Status: SHIPPED | OUTPATIENT
Start: 2022-09-29 | End: 2022-10-14

## 2022-09-29 RX ORDER — POTASSIUM CHLORIDE 750 MG/1
10 TABLET, EXTENDED RELEASE ORAL DAILY
COMMUNITY
Start: 2022-09-28 | End: 2022-09-29

## 2022-09-29 RX ORDER — CHOLECALCIFEROL (VITAMIN D3) 25 MCG
1000 TABLET ORAL DAILY
COMMUNITY

## 2022-09-29 NOTE — PROGRESS NOTES
OFFICE VISIT 9/29/22  9:20 AM CDT    CHIEF COMPLAINT: medical clearance for tooth extraction       HPI:      Laecy Calderón is a 67 y.o. female who presents to the office today for a preoperative consultation. Planning to have four teeth extracted by Dr. Robbin Umana D.D.S. on under local anesthesia. Dr. Umana is requesting medical clearance for patient to proceed with extractions and hold Eliquis prior to procedure. She is followed by Dr. Saldana with cardiology; last saw 10/2021 and advised to follow up in 3 months. Since her last visit, she had an ER presentation 8/23/22 for possible syncope.I reviewed these notes. Imaging, EKG, and labs other than CPK were unremarkable. She had her blood pressure medications adjusted five days prior to this episode and it was assumed the blood pressure medications caused hypotension. Her medications were not adjusted prior to discharge; she was continued on amlodipine 10 mg daily and Valsartan/hctz 320-25 mg daily. She states her BP at home mostly runs 130s/80s but will rise to 200/100 if she eats high salt foods. She has been out of her BP medications for 4 days.       DIAGNOSES ADDRESSED THIS VISIT   1. Preop exam for internal medicine  -     Ambulatory referral/consult to Cardiology    2. Essential hypertension  Assessment & Plan:  BP Readings from Last 3 Encounters:   08/18/22 (!) 152/92   07/22/22 (!) 156/98   02/02/22 120/70         Orders:  -     Basic Metabolic Panel  -     amLODIPine (NORVASC) 10 MG tablet  -     valsartan-hydrochlorothiazide (DIOVAN-HCT) 320-25 mg per tablet    3. Type 2 diabetes mellitus with hyperglycemia, with long-term current use of insulin  Assessment & Plan:  Diabetes Management Status    Statin: Taking  ACE/ARB: Taking    Screening or Prevention Patient's value Goal Complete/Controlled?   HgA1C Testing and Control   Lab Results   Component Value Date    HGBA1C 5.9 (H) 08/18/2022      Annually/Less than 8% Yes   Lipid profile : 08/18/2022  Annually Yes   LDL control Lab Results   Component Value Date    LDLCALC 93.6 08/18/2022    Annually/Less than 100 mg/dl  Yes   Nephropathy screening Lab Results   Component Value Date    LABMICR 18.0 08/18/2022     No results found for: PROTEINUA Annually Yes   Blood pressure BP Readings from Last 1 Encounters:   08/18/22 (!) 152/92    Less than 140/90 No   Dilated retinal exam : 08/03/2022 Annually Yes   Foot exam   : 07/22/2022 Annually Yes     Lab Results   Component Value Date    HGBA1C 5.9 (H) 08/18/2022    HGBA1C 6.3 (H) 01/31/2022    HGBA1C 15.6 (H) 05/22/2021    EGFRNORACEVR >60.0 08/18/2022    MICALBCREAT 6.0 08/18/2022    LDLCALC 93.6 08/18/2022     No results found for: GLUTAMICACID, CPEPTIDE   Last 6 Patient Entered Readings                                          Most Recent A1c:     There is no flowsheet data to display.        HEALTH MAINTENANCE: Diabetic health maintenance interventions reviewed and are up to date except for:  There are no preventive care reminders to display for this patient.        4. Pure hypercholesterolemia  Assessment & Plan:  Lab Results   Component Value Date    CHOL 169 08/18/2022    CHOL 196 01/31/2022    CHOL 255 (H) 10/19/2020     Lab Results   Component Value Date    HDL 60 08/18/2022    HDL 49 01/31/2022    HDL 66 10/19/2020     Lab Results   Component Value Date    LDLCALC 93.6 08/18/2022    LDLCALC 126.8 01/31/2022    LDLCALC 169.2 (H) 10/19/2020     Lab Results   Component Value Date    TRIG 77 08/18/2022    TRIG 101 01/31/2022    TRIG 99 10/19/2020     Lab Results   Component Value Date    CHOLHDL 35.5 08/18/2022    CHOLHDL 25.0 01/31/2022    CHOLHDL 25.9 10/19/2020           5. Paroxysmal atrial fibrillation  Assessment & Plan:  Followed by Cardiology, Dr. Saldana.   Discussed with patient that she needs cardiac clearance prior to procedure; she is chronically anticoagulated on eliquis       Orders:  -     Ambulatory referral/consult to Cardiology    6.  Diuretic-induced hypokalemia  Assessment & Plan:  Lab Results   Component Value Date    K 3.8 08/18/2022         Orders:  -     Basic Metabolic Panel    7. Class 2 severe obesity due to excess calories with serious comorbidity and body mass index (BMI) of 35.0 to 35.9 in adult  Assessment & Plan:  Wt Readings from Last 3 Encounters:   08/31/22 0902 101.6 kg (224 lb)   07/22/22 0929 102 kg (224 lb 13.9 oz)   02/02/22 0922 106.3 kg (234 lb 5.6 oz)           8. Type 2 diabetes mellitus with microalbuminuria, with long-term current use of insulin    9. Elevated CPK  -     CK         Plan:   - Clearance not provided at this time r/t hypertension and need for cardiac follow up. Patient must follow up with cardiology for clearance to hold Eliquis.   - BP elevated. Patient has been out of Valsartan/HCTZ and Amlodipine for 4 days. States she has been taking these medications consistently prior to running out. Check follow up BMP and CPK today. Reinitiate medications - take amlodipine at night and Valsartan/HCTZ in the morning. RTC in 1 week     Unless noted herein, any chronic conditions are represented as and appear compensated and stable. No other significant complaints or concerns were reported.     FOLLOW UP  Follow up in about 1 week (around 10/6/2022).  Future Appointments   Date Time Provider Department Center   9/29/2022 12:10 PM LABORATORY, [facility] LAB [facility]   10/17/2022 10:30 AM JAKE Merrill MD [facility] [place]   12/9/2022  9:45 AM Tanner Haque MD [facility]  [place]       REVIEW OF SYSTEMS  Review of Systems   Constitutional:  Negative for chills and fever.   HENT:  Negative for congestion, ear pain, hearing loss and sinus pain.    Eyes:  Negative for blurred vision, discharge and redness.   Respiratory:  Negative for cough, hemoptysis, sputum production, shortness of breath and wheezing.    Cardiovascular:  Negative for chest pain, palpitations, claudication and leg swelling.  "  Gastrointestinal:  Negative for abdominal pain, constipation, diarrhea, heartburn, nausea and vomiting.   Genitourinary:  Negative for dysuria, frequency and urgency.   Musculoskeletal:  Negative for back pain, falls, joint pain, myalgias and neck pain.   Skin:  Negative for itching and rash.   Neurological:  Negative for dizziness, tingling, sensory change, focal weakness, weakness and headaches.   Psychiatric/Behavioral:  Negative for depression.      PHYSICAL EXAM   Vitals:    09/29/22 0908 09/29/22 0921   BP: (!) 162/104 (!) 148/92   BP Location: Right arm    Patient Position: Sitting    BP Method: Large (Manual)    Temp: 98.4 °F (36.9 °C)    Weight: 102.7 kg (226 lb 6.6 oz)    Height: 5' 6" (1.676 m)        Physical Exam  Vitals and nursing note reviewed.   Constitutional:       Appearance: Normal appearance. She is obese.   HENT:      Head: Normocephalic.      Nose: Nose normal.   Eyes:      Conjunctiva/sclera: Conjunctivae normal.      Pupils: Pupils are equal, round, and reactive to light.   Cardiovascular:      Rate and Rhythm: Normal rate and regular rhythm.      Pulses: Normal pulses.   Pulmonary:      Effort: Pulmonary effort is normal.      Breath sounds: Normal breath sounds.   Abdominal:      General: Bowel sounds are normal.   Musculoskeletal:         General: Normal range of motion.      Cervical back: Normal range of motion and neck supple.   Skin:     General: Skin is warm and dry.   Neurological:      General: No focal deficit present.      Mental Status: She is alert. Mental status is at baseline.   Psychiatric:         Mood and Affect: Mood normal.        PRESCRIPTION DRUG MANAGEMENT  Outpatient Medications Prior to Visit   Medication Sig Dispense Refill    apixaban (ELIQUIS) 5 mg Tab Take 1 tablet (5 mg total) by mouth 2 (two) times daily. 180 tablet 0    BD JANE 2ND GEN PEN NEEDLE 32 gauge x 5/32" Ndle Use as directed to inject insulin [facility]. 200 each 11    blood " "sugar diagnostic Strp Check blood glucose 4 times daily (BEFORE breakfast or other meal) and as needed for symptoms of low or high blood glucose 200 each 11    blood-glucose meter kit Check blood glucose 4 times daily (BEFORE breakfast or other meal) and as needed for symptoms of low or high blood glucose 1 each 0    DIPH,PERTUSS,ACEL,TET-VAC,(ADACEL,TDAP ADOLESN/ADULT,PF)2 Lf-(2.5-5-3-5 mcg)-5Lf/0.5 mL Syrg       dorzolamide-timolol 2-0.5% (COSOPT) 22.3-6.8 mg/mL ophthalmic solution Place 1 drop into both eyes every 12 (twelve) hours. 10 mL 6    empty container (SHARPS CONTAINER) Misc Use to dispose sharps      insulin lispro 100 unit/mL pen Inject 9 Units into the skin 3 (three) times daily before meals. 15 mL 2    insulin syringe-needle U-100 0.5 mL 31 gauge x 5/16" Syrg Use as directed to inject insulin as prescribed. 100 each 0    lancets Misc Check blood glucose 4 times daily (BEFORE breakfast or other meal) and as needed for symptoms of low or high blood glucose 200 each 11    LANTUS SOLOSTAR U-100 INSULIN glargine 100 units/mL SubQ pen Inject 30 Units into the skin nightly. 30 mL 0    metFORMIN (GLUCOPHAGE-XR) 500 MG ER 24hr tablet Take 1 tablet (500 mg total) by mouth once daily. 90 tablet 3    metoprolol succinate (TOPROL-XL) 25 MG 24 hr tablet Take 1 tablet (25 mg total) by mouth every evening. 90 tablet 0    PNEUMOVAX-23 25 mcg/0.5 mL vaccine       potassium chloride SA (K-DUR,KLOR-CON) 10 MEQ tablet Take 1 tablet (10 mEq total) by mouth once daily. 90 tablet 3    rosuvastatin (CRESTOR) 20 MG tablet Take 1 tablet (20 mg total) by mouth every evening. 90 tablet 3    varicella-zoster gE-AS01B, PF, (SHINGRIX) 50 mcg/0.5 mL injection Inject 0.5 mL (one dose) into muscle now; give second dose at least 2 months later 1 each 1    vitamin D (VITAMIN D3) 1000 units Tab Take 1,000 Units by mouth once daily.      amLODIPine (NORVASC) 10 MG tablet Take 1 tablet (10 mg total) by mouth once daily. 30 tablet 0    " "valsartan-hydrochlorothiazide (DIOVAN-HCT) 320-25 mg per tablet Take 1 tablet by mouth once daily. 30 tablet 0    latanoprost 0.005 % ophthalmic solution Place into both eyes.      potassium chloride (KLOR-CON) 10 MEQ TbSR Take 10 mEq by mouth once daily.      traMADoL (ULTRAM) 50 mg tablet Take 50 mg by mouth every 6 (six) hours as needed for Pain.       No facility-administered medications prior to visit.     Medications Discontinued During This Encounter   Medication Reason    amLODIPine (NORVASC) 10 MG tablet Reorder    valsartan-hydrochlorothiazide (DIOVAN-HCT) 320-25 mg per tablet Reorder     Medications Ordered This Encounter   Medications    amLODIPine (NORVASC) 10 MG tablet     Sig: Take 1 tablet (10 mg total) by mouth once daily.     Dispense:  30 tablet     Refill:  0     NOTE DOSE CHANGE. DISCONTINUE any prescription for this drug issued prior to 08/18/2022. Dispense only 30-day QTY. Do not request 90-day QTY. Blood pressure check required for further refills.    valsartan-hydrochlorothiazide (DIOVAN-HCT) 320-25 mg per tablet     Sig: Take 1 tablet by mouth once daily.     Dispense:  30 tablet     Refill:  0     This REPLACES losartan-hydrochlorothiazide. DISCONTINUE any existing prescription for losartan-hydrochlorothiazide. Dispense only 30-day QTY. Do not request 90-day QTY. BP check required for refills.       Documentation entered by me for this encounter may have been done in part using speech-recognition technology. Although I have made an effort to ensure accuracy, "sound like" errors may exist and should be interpreted in context.        "

## 2022-09-29 NOTE — ASSESSMENT & PLAN NOTE
Followed by Cardiology, Dr. Saldana.   Discussed with patient that she needs cardiac clearance prior to procedure; she is chronically anticoagulated on eliquis

## 2022-09-29 NOTE — ASSESSMENT & PLAN NOTE
Wt Readings from Last 3 Encounters:   08/31/22 0902 101.6 kg (224 lb)   07/22/22 0929 102 kg (224 lb 13.9 oz)   02/02/22 0922 106.3 kg (234 lb 5.6 oz)

## 2022-09-29 NOTE — ASSESSMENT & PLAN NOTE
BP Readings from Last 3 Encounters:   08/18/22 (!) 152/92   07/22/22 (!) 156/98   02/02/22 120/70

## 2022-09-29 NOTE — ASSESSMENT & PLAN NOTE
Diabetes Management Status    Statin: Taking  ACE/ARB: Taking    Screening or Prevention Patient's value Goal Complete/Controlled?   HgA1C Testing and Control   Lab Results   Component Value Date    HGBA1C 5.9 (H) 08/18/2022      Annually/Less than 8% Yes   Lipid profile : 08/18/2022 Annually Yes   LDL control Lab Results   Component Value Date    LDLCALC 93.6 08/18/2022    Annually/Less than 100 mg/dl  Yes   Nephropathy screening Lab Results   Component Value Date    LABMICR 18.0 08/18/2022     No results found for: PROTEINUA Annually Yes   Blood pressure BP Readings from Last 1 Encounters:   08/18/22 (!) 152/92    Less than 140/90 No   Dilated retinal exam : 08/03/2022 Annually Yes   Foot exam   : 07/22/2022 Annually Yes     Lab Results   Component Value Date    HGBA1C 5.9 (H) 08/18/2022    HGBA1C 6.3 (H) 01/31/2022    HGBA1C 15.6 (H) 05/22/2021    EGFRNORACEVR >60.0 08/18/2022    MICALBCREAT 6.0 08/18/2022    LDLCALC 93.6 08/18/2022     No results found for: GLUTAMICACID, CPEPTIDE   Last 6 Patient Entered Readings                                          Most Recent A1c:     There is no flowsheet data to display.        HEALTH MAINTENANCE: Diabetic health maintenance interventions reviewed and are up to date except for:  There are no preventive care reminders to display for this patient.

## 2022-09-29 NOTE — ASSESSMENT & PLAN NOTE
Lab Results   Component Value Date    CHOL 169 08/18/2022    CHOL 196 01/31/2022    CHOL 255 (H) 10/19/2020     Lab Results   Component Value Date    HDL 60 08/18/2022    HDL 49 01/31/2022    HDL 66 10/19/2020     Lab Results   Component Value Date    LDLCALC 93.6 08/18/2022    LDLCALC 126.8 01/31/2022    LDLCALC 169.2 (H) 10/19/2020     Lab Results   Component Value Date    TRIG 77 08/18/2022    TRIG 101 01/31/2022    TRIG 99 10/19/2020     Lab Results   Component Value Date    CHOLHDL 35.5 08/18/2022    CHOLHDL 25.0 01/31/2022    CHOLHDL 25.9 10/19/2020

## 2022-10-06 ENCOUNTER — OFFICE VISIT (OUTPATIENT)
Dept: SPORTS MEDICINE | Facility: CLINIC | Age: 67
End: 2022-10-06
Payer: MEDICARE

## 2022-10-06 ENCOUNTER — HOSPITAL ENCOUNTER (OUTPATIENT)
Dept: RADIOLOGY | Facility: HOSPITAL | Age: 67
Discharge: HOME | End: 2022-10-06
Attending: STUDENT IN AN ORGANIZED HEALTH CARE EDUCATION/TRAINING PROGRAM
Payer: MEDICARE

## 2022-10-06 VITALS — SYSTOLIC BLOOD PRESSURE: 166 MMHG | HEART RATE: 74 BPM | DIASTOLIC BLOOD PRESSURE: 99 MMHG

## 2022-10-06 DIAGNOSIS — E66.9 OBESITY (BMI 35.0-39.9 WITHOUT COMORBIDITY): ICD-10-CM

## 2022-10-06 DIAGNOSIS — G89.29 BILATERAL CHRONIC KNEE PAIN: ICD-10-CM

## 2022-10-06 DIAGNOSIS — M25.561 PAIN IN BOTH KNEES, UNSPECIFIED CHRONICITY: Primary | ICD-10-CM

## 2022-10-06 DIAGNOSIS — M25.561 PAIN IN BOTH KNEES, UNSPECIFIED CHRONICITY: ICD-10-CM

## 2022-10-06 DIAGNOSIS — M17.0 PRIMARY LOCALIZED OSTEOARTHRITIS OF KNEES, BILATERAL: Primary | ICD-10-CM

## 2022-10-06 DIAGNOSIS — M25.561 BILATERAL CHRONIC KNEE PAIN: ICD-10-CM

## 2022-10-06 DIAGNOSIS — M25.562 BILATERAL CHRONIC KNEE PAIN: ICD-10-CM

## 2022-10-06 DIAGNOSIS — Z79.01 CHRONIC ANTICOAGULATION: ICD-10-CM

## 2022-10-06 DIAGNOSIS — M25.562 PAIN IN BOTH KNEES, UNSPECIFIED CHRONICITY: Primary | ICD-10-CM

## 2022-10-06 DIAGNOSIS — M25.562 PAIN IN BOTH KNEES, UNSPECIFIED CHRONICITY: ICD-10-CM

## 2022-10-06 PROCEDURE — 99999 PR PBB SHADOW E&M-EST. PATIENT-LVL V: ICD-10-PCS | Mod: PBBFAC,,, | Performed by: STUDENT IN AN ORGANIZED HEALTH CARE EDUCATION/TRAINING PROGRAM

## 2022-10-06 PROCEDURE — 1159F PR MEDICATION LIST DOCUMENTED IN MEDICAL RECORD: ICD-10-PCS | Mod: CPTII,S$GLB,, | Performed by: STUDENT IN AN ORGANIZED HEALTH CARE EDUCATION/TRAINING PROGRAM

## 2022-10-06 PROCEDURE — 99204 PR OFFICE/OUTPT VISIT, NEW, LEVL IV, 45-59 MIN: ICD-10-PCS | Mod: S$GLB,,, | Performed by: STUDENT IN AN ORGANIZED HEALTH CARE EDUCATION/TRAINING PROGRAM

## 2022-10-06 PROCEDURE — 1160F RVW MEDS BY RX/DR IN RCRD: CPT | Mod: CPTII,S$GLB,, | Performed by: STUDENT IN AN ORGANIZED HEALTH CARE EDUCATION/TRAINING PROGRAM

## 2022-10-06 PROCEDURE — 1125F AMNT PAIN NOTED PAIN PRSNT: CPT | Mod: CPTII,S$GLB,, | Performed by: STUDENT IN AN ORGANIZED HEALTH CARE EDUCATION/TRAINING PROGRAM

## 2022-10-06 PROCEDURE — 3288F PR FALLS RISK ASSESSMENT DOCUMENTED: ICD-10-PCS | Mod: CPTII,S$GLB,, | Performed by: STUDENT IN AN ORGANIZED HEALTH CARE EDUCATION/TRAINING PROGRAM

## 2022-10-06 PROCEDURE — 1160F PR REVIEW ALL MEDS BY PRESCRIBER/CLIN PHARMACIST DOCUMENTED: ICD-10-PCS | Mod: CPTII,S$GLB,, | Performed by: STUDENT IN AN ORGANIZED HEALTH CARE EDUCATION/TRAINING PROGRAM

## 2022-10-06 PROCEDURE — 3077F SYST BP >= 140 MM HG: CPT | Mod: CPTII,S$GLB,, | Performed by: STUDENT IN AN ORGANIZED HEALTH CARE EDUCATION/TRAINING PROGRAM

## 2022-10-06 PROCEDURE — 1101F PT FALLS ASSESS-DOCD LE1/YR: CPT | Mod: CPTII,S$GLB,, | Performed by: STUDENT IN AN ORGANIZED HEALTH CARE EDUCATION/TRAINING PROGRAM

## 2022-10-06 PROCEDURE — 73564 XR KNEE ORTHO BILAT WITH FLEXION: ICD-10-PCS | Mod: 26,50,, | Performed by: RADIOLOGY

## 2022-10-06 PROCEDURE — 3044F HG A1C LEVEL LT 7.0%: CPT | Mod: CPTII,S$GLB,, | Performed by: STUDENT IN AN ORGANIZED HEALTH CARE EDUCATION/TRAINING PROGRAM

## 2022-10-06 PROCEDURE — 3080F PR MOST RECENT DIASTOLIC BLOOD PRESSURE >= 90 MM HG: ICD-10-PCS | Mod: CPTII,S$GLB,, | Performed by: STUDENT IN AN ORGANIZED HEALTH CARE EDUCATION/TRAINING PROGRAM

## 2022-10-06 PROCEDURE — 99204 OFFICE O/P NEW MOD 45 MIN: CPT | Mod: S$GLB,,, | Performed by: STUDENT IN AN ORGANIZED HEALTH CARE EDUCATION/TRAINING PROGRAM

## 2022-10-06 PROCEDURE — 3044F PR MOST RECENT HEMOGLOBIN A1C LEVEL <7.0%: ICD-10-PCS | Mod: CPTII,S$GLB,, | Performed by: STUDENT IN AN ORGANIZED HEALTH CARE EDUCATION/TRAINING PROGRAM

## 2022-10-06 PROCEDURE — 3080F DIAST BP >= 90 MM HG: CPT | Mod: CPTII,S$GLB,, | Performed by: STUDENT IN AN ORGANIZED HEALTH CARE EDUCATION/TRAINING PROGRAM

## 2022-10-06 PROCEDURE — 1101F PR PT FALLS ASSESS DOC 0-1 FALLS W/OUT INJ PAST YR: ICD-10-PCS | Mod: CPTII,S$GLB,, | Performed by: STUDENT IN AN ORGANIZED HEALTH CARE EDUCATION/TRAINING PROGRAM

## 2022-10-06 PROCEDURE — 3288F FALL RISK ASSESSMENT DOCD: CPT | Mod: CPTII,S$GLB,, | Performed by: STUDENT IN AN ORGANIZED HEALTH CARE EDUCATION/TRAINING PROGRAM

## 2022-10-06 PROCEDURE — 1125F PR PAIN SEVERITY QUANTIFIED, PAIN PRESENT: ICD-10-PCS | Mod: CPTII,S$GLB,, | Performed by: STUDENT IN AN ORGANIZED HEALTH CARE EDUCATION/TRAINING PROGRAM

## 2022-10-06 PROCEDURE — 73564 X-RAY EXAM KNEE 4 OR MORE: CPT | Mod: TC,50

## 2022-10-06 PROCEDURE — 73564 X-RAY EXAM KNEE 4 OR MORE: CPT | Mod: 26,50,, | Performed by: RADIOLOGY

## 2022-10-06 PROCEDURE — 3077F PR MOST RECENT SYSTOLIC BLOOD PRESSURE >= 140 MM HG: ICD-10-PCS | Mod: CPTII,S$GLB,, | Performed by: STUDENT IN AN ORGANIZED HEALTH CARE EDUCATION/TRAINING PROGRAM

## 2022-10-06 PROCEDURE — 3072F PR LOW RISK FOR RETINOPATHY: ICD-10-PCS | Mod: CPTII,S$GLB,, | Performed by: STUDENT IN AN ORGANIZED HEALTH CARE EDUCATION/TRAINING PROGRAM

## 2022-10-06 PROCEDURE — 99999 PR PBB SHADOW E&M-EST. PATIENT-LVL V: CPT | Mod: PBBFAC,,, | Performed by: STUDENT IN AN ORGANIZED HEALTH CARE EDUCATION/TRAINING PROGRAM

## 2022-10-06 PROCEDURE — 3061F PR NEG MICROALBUMINURIA RESULT DOCUMENTED/REVIEW: ICD-10-PCS | Mod: CPTII,S$GLB,, | Performed by: STUDENT IN AN ORGANIZED HEALTH CARE EDUCATION/TRAINING PROGRAM

## 2022-10-06 PROCEDURE — 3066F PR DOCUMENTATION OF TREATMENT FOR NEPHROPATHY: ICD-10-PCS | Mod: CPTII,S$GLB,, | Performed by: STUDENT IN AN ORGANIZED HEALTH CARE EDUCATION/TRAINING PROGRAM

## 2022-10-06 PROCEDURE — 3066F NEPHROPATHY DOC TX: CPT | Mod: CPTII,S$GLB,, | Performed by: STUDENT IN AN ORGANIZED HEALTH CARE EDUCATION/TRAINING PROGRAM

## 2022-10-06 PROCEDURE — 1159F MED LIST DOCD IN RCRD: CPT | Mod: CPTII,S$GLB,, | Performed by: STUDENT IN AN ORGANIZED HEALTH CARE EDUCATION/TRAINING PROGRAM

## 2022-10-06 PROCEDURE — 3072F LOW RISK FOR RETINOPATHY: CPT | Mod: CPTII,S$GLB,, | Performed by: STUDENT IN AN ORGANIZED HEALTH CARE EDUCATION/TRAINING PROGRAM

## 2022-10-06 PROCEDURE — 3061F NEG MICROALBUMINURIA REV: CPT | Mod: CPTII,S$GLB,, | Performed by: STUDENT IN AN ORGANIZED HEALTH CARE EDUCATION/TRAINING PROGRAM

## 2022-10-06 NOTE — PROGRESS NOTES
Patient ID: Lacey Calderón  YOB: 1955  MRN: [id number]    Chief Complaint: Pain of the Right Knee      Referred By: Clarita Bhatti NP    ***School/Grade/Sport: ***    ***Occupation: Retired      History of Present Illness: Lacey Calderón is a left-hand dominant 67 y.o. female who presents today with 8/10 pain c/o right knee pain. She states that the pain in June 2022. She has a history of rheumatoid arthritis. She describes the pain as intermittent sharp pain. She has used biofreeze, elevation, and tylenol for relief. Her pain is made worse by prolonged sitting.      The patient is active in none.      ***    Past Medical History:   Past Medical History:   Diagnosis Date    Arthritis     Cardiac arrhythmia 11/13/2020    Cataract     Class 2 severe obesity due to excess calories with serious comorbidity and body mass index (BMI) of 37.0 to 37.9 in adult 10/19/2020    Essential hypertension 10/19/2020    Hypertension     Paroxysmal atrial fibrillation 11/13/2020    Primary localized osteoarthritis of knees, bilateral 10/19/2020    Pure hypercholesterolemia 10/24/2020    Pure hypercholesterolemia 10/24/2020    Type 2 diabetes mellitus with hyperglycemia, with long-term current use of insulin 6/3/2021     History reviewed. No pertinent surgical history.  Family History   Problem Relation Age of Onset    Arthritis Mother     Heart disease Mother     Miscarriages / Stillbirths Mother     Dementia Mother     Glaucoma Mother     Cataracts Mother     No Known Problems Father      Social History     Socioeconomic History    Marital status: Unknown   Tobacco Use    Smoking status: Never    Smokeless tobacco: Never   Substance and Sexual Activity    Alcohol use: Never    Drug use: Never    Sexual activity: Not Currently     Partners: Male     Medication List with Changes/Refills   Current Medications    AMLODIPINE (NORVASC) 10 MG TABLET    Take 1 tablet (10 mg total) by mouth once daily.    APIXABAN  "(ELIQUIS) 5 MG TAB    Take 1 tablet (5 mg total) by mouth 2 (two) times daily.    BD JANE 2ND GEN PEN NEEDLE 32 GAUGE X 5/32" NDLE    Use as directed to inject insulin as prescribed by [facility].    BLOOD SUGAR DIAGNOSTIC STRP    Check blood glucose 4 times daily (BEFORE breakfast or other meal) and as needed for symptoms of low or high blood glucose    BLOOD-GLUCOSE METER KIT    Check blood glucose 4 times daily (BEFORE breakfast or other meal) and as needed for symptoms of low or high blood glucose    DIPH,PERTUSS,ACEL,TET-VAC,(ADACEL,TDAP ADOLESN/ADULT,PF)2 LF-(2.5-5-3-5 MCG)-5LF/0.5 ML SYRG        DORZOLAMIDE-TIMOLOL 2-0.5% (COSOPT) 22.3-6.8 MG/ML OPHTHALMIC SOLUTION    Place 1 drop into both eyes every 12 (twelve) hours.    EMPTY CONTAINER (SHARPS CONTAINER) MISC    Use to dispose sharps    INSULIN LISPRO 100 UNIT/ML PEN    Inject 9 Units into the skin 3 (three) times daily before meals.    INSULIN SYRINGE-NEEDLE U-100 0.5 ML 31 GAUGE X 5/16" SYRG    Use as directed to inject insulin as prescribed.    LANCETS MISC    Check blood glucose 4 times daily (BEFORE breakfast or other meal) and as needed for symptoms of low or high blood glucose    LANTUS SOLOSTAR U-100 INSULIN GLARGINE 100 UNITS/ML SUBQ PEN    Inject 30 Units into the skin nightly.    LATANOPROST 0.005 % OPHTHALMIC SOLUTION    Place into both eyes.    METFORMIN (GLUCOPHAGE-XR) 500 MG ER 24HR TABLET    Take 1 tablet (500 mg total) by mouth once daily.    METOPROLOL SUCCINATE (TOPROL-XL) 25 MG 24 HR TABLET    Take 1 tablet (25 mg total) by mouth every evening.    PNEUMOVAX-23 25 MCG/0.5 ML VACCINE        POTASSIUM CHLORIDE SA (K-DUR,KLOR-CON) 10 MEQ TABLET    Take 1 tablet (10 mEq total) by mouth once daily.    ROSUVASTATIN (CRESTOR) 20 MG TABLET    Take 1 tablet (20 mg total) by mouth every evening.    TRAMADOL (ULTRAM) 50 MG TABLET    Take 50 mg by mouth every 6 (six) hours as needed for Pain.    VALSARTAN-HYDROCHLOROTHIAZIDE (DIOVAN-HCT) " 320-25 MG PER TABLET    Take 1 tablet by mouth once daily.    VARICELLA-ZOSTER GE-AS01B, PF, (SHINGRIX) 50 MCG/0.5 ML INJECTION    Inject 0.5 mL (one dose) into muscle now; give second dose at least 2 months later    VITAMIN D (VITAMIN D3) 1000 UNITS TAB    Take 1,000 Units by mouth once daily.     Review of patient's allergies indicates:  No Known Allergies    Physical Exam:   There is no height or weight on file to calculate BMI.    Physical Exam      Detailed MSK exam:   Ortho/SPM Exam       Imaging:  X-ray Knee Ortho Bilateral with Flexion  Narrative: EXAMINATION:  XR KNEE ORTHO BILAT WITH FLEXION    CLINICAL HISTORY:  Pain in right knee    TECHNIQUE:  AP standing of both knees, PA flexion standing views of both knees, and Merchant views of both knees were performed.  Lateral views of both knees were also performed.    COMPARISON:  Radiographs from 10/19/2020    FINDINGS:  Bilateral tricompartmental degenerative changes are seen, right greater than left most severe in the medial compartments with high-grade joint space narrowing adjacent marginal spurring, right greater than left.  Right knee joint effusion is seen.  No left knee joint effusion.  Similar appearing calcific densities along the posterior aspect of the knee joints bilaterally.  Bones appear demineralized.  Soft tissues are normal.  No significant change from prior exam.  Impression: Please see above    Electronically signed by: Raúl Esteban MD  Date:    10/06/2022  Time:    11:02    ***    Relevant imaging results were reviewed and interpreted by me and per my read: ***    This was discussed with the patient and / or family today.       There are no Patient Instructions on file for this visit.        A copy of today's visit note has been sent to the referring provider.       Braydon Root MD  Primary Care Sports Medicine        Disclaimer: This note was prepared using a voice recognition system and is likely to have sound alike errors within the  text.     I spent a total of *** minutes on the day of the visit.This includes face to face time and non-face to face time preparing to see the patient (eg, review of tests), obtaining and/or reviewing separately obtained history, documenting clinical information in the electronic or other health record, independently interpreting results and communicating results to the patient/family/caregiver, or care coordinator.

## 2022-10-06 NOTE — PATIENT INSTRUCTIONS
Assessment:  Lacey Calderón is a 67 y.o. female with a chief complaint of Pain of the Right Knee      Encounter Diagnoses   Name Primary?    Primary localized osteoarthritis of knees, bilateral Yes    Bilateral chronic knee pain     Obesity (BMI 35.0-39.9 without comorbidity)     Chronic anticoagulation         Plan:  XR reviewed today, severe medial joint space narrowing of R knee, moderate medial narrowing of L  The patient's history, clinical exam, and imaging findings are consistent with primary osteoarthritis, R knee more painful than left  Labs reviewed: A1c 5.9%, GFR wnl, LFTs wnl  Measured for medial  brace today right knee  Defer cortisone injections today due to high blood pressure reading, recommend to follow up with PCP for continued control  NSAIDs contraindicated in setting of chronic anticoagulation on Eliquis  Continue OTC Tylenol as needed for pain  Encouraged weight loss, as able, low impact exercises such as walking, bike, elliptical, water aerobics  Consider CSI at follow up if BP improved    Although there is not a cure for arthritis, there are effective ways to improve symptoms.     Exercise & Activity:  I recommend low impact activities such as elliptical and bicycle   Walking is great for arthritis: https://www.EvolveMol.com/3-reasons-walking-with-knee-arthritis/  If walking long distances, I recommend good quality well-cushioned shoes. Varsity sports can help you find the right ones: https://www.Sandatas[employer]Pirate3Ding[employer]/  Aquatic and pool therapy is often helpful because it lessens the impact on the joint, strengthens the leg and thigh muscles, and helps to control swelling.   Knee motion is important to the health of the knee.     Knee Braces:  A compression knee sleeve can help limit swelling and provide proprioceptive feedback.     Prescriptions & Medications:  I do recommend formal physical therapy or at minimum a home exercise program.   Over the counter analgesic  (pain-relieving) medications can help. Examples are Tylenol, Ibuprofen, and Aleve. Check with your primary care physician to make sure you don't have contra-indications to taking those medicines.  Some over the counter supplement solutions such as glucosamine and chondroitin may help with symptoms, although the evidence is mixed.    Healthy Lifestyle:  Excess body weight can have a negative impact on joint health and on pain. I recommend healthy lifestyle choices including nutrition and exercise that help reach and maintain an ideal body weight. Tips for Exercise: https://www.[employer]ar/13-exercise-tips-for-a-healthier-you/  Some diets cause increased inflammation. I recommend a balanced wholesome diet including some foods such as olives that are shown to decrease inflammation. More diet information available here: https://www.Sourcebazaar/8-best-foods-for-knee-arthritis/     Follow-up: 3 weeks or sooner if there are any problems between now and then.    Thank you for choosing [facility] and Dr. Braydon Root for your orthopedic & sports medicine care. It is our goal to provide you with exceptional care that will help keep you healthy, active, and get you back in the game.    Please do not hesitate to reach out to us via email, phone, or MyChart with any questions, concerns, or feedback.    If you are experiencing pain/discomfort ,or have questions after 5pm and would like to be connected to the [facility] on-call team, please call this number and specify which Sports Medicine provider is treating you: [phone]

## 2022-10-06 NOTE — PROGRESS NOTES
Patient ID: Lacey Calderón  YOB: 1955  MRN: [id number]    Chief Complaint: Pain of the Right Knee      Referred By: Clarita Bhatti NP    Occupation: Retired      History of Present Illness: Lacey Calderón is a left-hand dominant 67 y.o. female who presents today with 8/10 pain c/o right knee pain. She states that the pain began in June 2022. She has a history of rheumatoid arthritis. She describes the pain as intermittent sharp pain. She has used biofreeze, elevation, and tylenol for relief. Her pain is made worse by prolonged sitting.  She has not had any formal treatment for this problem.      Past Medical History:   Past Medical History:   Diagnosis Date    Arthritis     Cardiac arrhythmia 11/13/2020    Cataract     Class 2 severe obesity due to excess calories with serious comorbidity and body mass index (BMI) of 37.0 to 37.9 in adult 10/19/2020    Essential hypertension 10/19/2020    Hypertension     Paroxysmal atrial fibrillation 11/13/2020    Primary localized osteoarthritis of knees, bilateral 10/19/2020    Pure hypercholesterolemia 10/24/2020    Pure hypercholesterolemia 10/24/2020    Type 2 diabetes mellitus with hyperglycemia, with long-term current use of insulin 6/3/2021     History reviewed. No pertinent surgical history.  Family History   Problem Relation Age of Onset    Arthritis Mother     Heart disease Mother     Miscarriages / Stillbirths Mother     Dementia Mother     Glaucoma Mother     Cataracts Mother     No Known Problems Father      Social History     Socioeconomic History    Marital status: Unknown   Tobacco Use    Smoking status: Never    Smokeless tobacco: Never   Substance and Sexual Activity    Alcohol use: Never    Drug use: Never    Sexual activity: Not Currently     Partners: Male     Medication List with Changes/Refills   Current Medications    AMLODIPINE (NORVASC) 10 MG TABLET    Take 1 tablet (10 mg total) by mouth once daily.    APIXABAN (ELIQUIS) 5 MG TAB   "  Take 1 tablet (5 mg total) by mouth 2 (two) times daily.    BD JANE 2ND GEN PEN NEEDLE 32 GAUGE X 5/32" NDLE    Use as directed to inject insulin [facility].    BLOOD SUGAR DIAGNOSTIC STRP    Check blood glucose 4 times daily (BEFORE breakfast or other meal) and as needed for symptoms of low or high blood glucose    BLOOD-GLUCOSE METER KIT    Check blood glucose 4 times daily (BEFORE breakfast or other meal) and as needed for symptoms of low or high blood glucose    DIPH,PERTUSS,ACEL,TET-VAC,(ADACEL,TDAP ADOLESN/ADULT,PF)2 LF-(2.5-5-3-5 MCG)-5LF/0.5 ML SYRG        DORZOLAMIDE-TIMOLOL 2-0.5% (COSOPT) 22.3-6.8 MG/ML OPHTHALMIC SOLUTION    Place 1 drop into both eyes every 12 (twelve) hours.    EMPTY CONTAINER (SHARPS CONTAINER) MISC    Use to dispose sharps    INSULIN LISPRO 100 UNIT/ML PEN    Inject 9 Units into the skin 3 (three) times daily before meals.    INSULIN SYRINGE-NEEDLE U-100 0.5 ML 31 GAUGE X 5/16" SYRG    Use as directed to inject insulin as prescribed.    LANCETS MISC    Check blood glucose 4 times daily (BEFORE breakfast or other meal) and as needed for symptoms of low or high blood glucose    LANTUS SOLOSTAR U-100 INSULIN GLARGINE 100 UNITS/ML SUBQ PEN    Inject 30 Units into the skin nightly.    LATANOPROST 0.005 % OPHTHALMIC SOLUTION    Place into both eyes.    METFORMIN (GLUCOPHAGE-XR) 500 MG ER 24HR TABLET    Take 1 tablet (500 mg total) by mouth once daily.    METOPROLOL SUCCINATE (TOPROL-XL) 25 MG 24 HR TABLET    Take 1 tablet (25 mg total) by mouth every evening.    PNEUMOVAX-23 25 MCG/0.5 ML VACCINE        POTASSIUM CHLORIDE SA (K-DUR,KLOR-CON) 10 MEQ TABLET    Take 1 tablet (10 mEq total) by mouth once daily.    ROSUVASTATIN (CRESTOR) 20 MG TABLET    Take 1 tablet (20 mg total) by mouth every evening.    TRAMADOL (ULTRAM) 50 MG TABLET    Take 50 mg by mouth every 6 (six) hours as needed for Pain.    VALSARTAN-HYDROCHLOROTHIAZIDE (DIOVAN-HCT) 320-25 MG PER TABLET    " Take 1 tablet by mouth once daily.    VARICELLA-ZOSTER GE-AS01B, PF, (SHINGRIX) 50 MCG/0.5 ML INJECTION    Inject 0.5 mL (one dose) into muscle now; give second dose at least 2 months later    VITAMIN D (VITAMIN D3) 1000 UNITS TAB    Take 1,000 Units by mouth once daily.     Review of patient's allergies indicates:  No Known Allergies    Physical Exam:   There is no height or weight on file to calculate BMI.    Physical Exam  Constitutional:       General: She is not in acute distress.     Appearance: Normal appearance.   HENT:      Head: Normocephalic and atraumatic.   Eyes:      Extraocular Movements: Extraocular movements intact.      Conjunctiva/sclera: Conjunctivae normal.   Pulmonary:      Effort: Pulmonary effort is normal. No respiratory distress.   Skin:     General: Skin is warm and dry.   Neurological:      General: No focal deficit present.      Mental Status: She is alert and oriented to person, place, and time.   Psychiatric:         Mood and Affect: Mood normal.         Detailed MSK exam:     Right Knee:    Inspection: No joint effusion, erythema, or ecchymosis  Palpation tenderness: Medial, patellofemoral joint line  Range of motion: 0 deg extension - 120 deg flexion  Strength:  5/5 Extension    5/5 Flexion  Stability: Stable ACL/Lachman      Stable Posterior Drawer      Stable MCL/Valgus Stress      Stable LCL/Varus Stress   Patella Exam: Negative J-sign   Negative Patellar apprehension   + Patellar grind   N/V Exam:  Tibial:    Normal sensory (plantar foot)  Normal motor (FHL)    Sup Peroneal:   Normal sensory (dorsal foot)  Normal motor (Peroneals)            Deep Peroneal:   Normal sensory (1st web space)  Normal motor (EHL)    Sural:   Normal sensory (lateral foot)   Saphenous:   Normal sensory (medial lower leg)   Normal pedal pulses, warm and well perfused with capillary refill < 2 sec       Left Knee:    Inspection: No joint effusion, erythema, or ecchymosis  Palpation tenderness: Medial,  patellofemoral joint line  Range of motion: 0 deg extension - 120 deg flexion  Strength:  5/5 Extension    5/5 Flexion  Stability: Stable ACL/Lachman      Stable Posterior Drawer      Stable MCL/Valgus Stress      Stable LCL/Varus Stress   Patella Exam: Negative J-sign   Negative Patellar apprehension   + Patellar grind   N/V Exam:  Tibial:    Normal sensory (plantar foot)  Normal motor (FHL)    Sup Peroneal:   Normal sensory (dorsal foot)  Normal motor (Peroneals)            Deep Peroneal:   Normal sensory (1st web space)  Normal motor (EHL)    Sural:   Normal sensory (lateral foot)   Saphenous:   Normal sensory (medial lower leg)   Normal pedal pulses, warm and well perfused with capillary refill < 2 sec      Imaging:  X-ray Knee Ortho Bilateral with Flexion  Narrative: EXAMINATION:  XR KNEE ORTHO BILAT WITH FLEXION    CLINICAL HISTORY:  Pain in right knee    TECHNIQUE:  AP standing of both knees, PA flexion standing views of both knees, and Merchant views of both knees were performed.  Lateral views of both knees were also performed.    COMPARISON:  Radiographs from 10/19/2020    FINDINGS:  Bilateral tricompartmental degenerative changes are seen, right greater than left most severe in the medial compartments with high-grade joint space narrowing adjacent marginal spurring, right greater than left.  Right knee joint effusion is seen.  No left knee joint effusion.  Similar appearing calcific densities along the posterior aspect of the knee joints bilaterally.  Bones appear demineralized.  Soft tissues are normal.  No significant change from prior exam.  Impression: Please see above    Electronically signed by: Raúl Esteban MD  Date:    10/06/2022  Time:    11:02    Relevant imaging results were reviewed and interpreted by me and per my read:  Severe medial joint space narrowing of the right knee, with degenerative changes and osteophytic changes of the lateral and patellofemoral compartments.  There is more  moderate medial joint space narrowing, in the setting of tricompartmental degenerative changes of the left knee.  No fractures or other acute abnormalities.    This was discussed with the patient and / or family today.       Patient Instructions   Assessment:  Lacey Calderón is a 67 y.o. female with a chief complaint of Pain of the Right Knee      Encounter Diagnoses   Name Primary?    Primary localized osteoarthritis of knees, bilateral Yes    Bilateral chronic knee pain     Obesity (BMI 35.0-39.9 without comorbidity)     Chronic anticoagulation         Plan:  XR reviewed today, severe medial joint space narrowing of R knee, moderate medial narrowing of L  The patient's history, clinical exam, and imaging findings are consistent with primary osteoarthritis, R knee more painful than left  Labs reviewed: A1c 5.9%, GFR wnl, LFTs wnl  Measured for medial  brace today right knee  Defer cortisone injections today due to high blood pressure reading, recommend to follow up with PCP for continued control  NSAIDs contraindicated in setting of chronic anticoagulation on Eliquis  Continue OTC Tylenol as needed for pain  Encouraged weight loss, as able, low impact exercises such as walking, bike, elliptical, water aerobics  Consider CSI at follow up if BP improved    Although there is not a cure for arthritis, there are effective ways to improve symptoms.     Exercise & Activity:  I recommend low impact activities such as elliptical and bicycle   Walking is great for arthritis: https://www.[employer].com/3-reasons-walking-with-knee-arthritis/  If walking long distances, I recommend good quality well-cushioned shoes. Varsity sports can help you find the right ones: https://www.[employer].com/  Aquatic and pool therapy is often helpful because it lessens the impact on the joint, strengthens the leg and thigh muscles, and helps to control swelling.   Knee motion is important to the health of the knee.     Knee  Braces:  A compression knee sleeve can help limit swelling and provide proprioceptive feedback.     Prescriptions & Medications:  I do recommend formal physical therapy or at minimum a home exercise program.   Over the counter analgesic (pain-relieving) medications can help. Examples are Tylenol, Ibuprofen, and Aleve. Check with your primary care physician to make sure you don't have contra-indications to taking those medicines.  Some over the counter supplement solutions such as glucosamine and chondroitin may help with symptoms, although the evidence is mixed.    Healthy Lifestyle:  Excess body weight can have a negative impact on joint health and on pain. I recommend healthy lifestyle choices including nutrition and exercise that help reach and maintain an ideal body weight. Tips for Exercise: https://www.[employer]/13-exercise-tips-for-a-healthier-you/  Some diets cause increased inflammation. I recommend a balanced wholesome diet including some foods such as olives that are shown to decrease inflammation. More diet information available here: https://www.[employer]/8-best-foods-for-knee-arthritis/     Follow-up: 3 weeks or sooner if there are any problems between now and then.    Thank you for choosing [facility] and Dr. Braydon Root for your orthopedic & sports medicine care. It is our goal to provide you with exceptional care that will help keep you healthy, active, and get you back in the game.    Please do not hesitate to reach out to us via email, phone, or MyChart with any questions, concerns, or feedback.    If you are experiencing pain/discomfort ,or have questions after 5pm and would like to be connected to the [facility] on-call team, please call this number and specify which Sports Medicine provider is treating you: [phone]          A copy of today's visit note has been sent to the referring provider.       Braydon  MD Dk  Primary Care Sports Medicine        Disclaimer: This note was prepared using a voice recognition system and is likely to have sound alike errors within the text.

## 2022-10-13 ENCOUNTER — CLINICAL SUPPORT (OUTPATIENT)
Dept: INTERNAL MEDICINE | Facility: CLINIC | Age: 67
End: 2022-10-13
Payer: MEDICARE

## 2022-10-13 ENCOUNTER — TELEPHONE (OUTPATIENT)
Dept: INTERNAL MEDICINE | Facility: CLINIC | Age: 67
End: 2022-10-13

## 2022-10-13 VITALS — OXYGEN SATURATION: 98 % | HEART RATE: 76 BPM | SYSTOLIC BLOOD PRESSURE: 146 MMHG | DIASTOLIC BLOOD PRESSURE: 88 MMHG

## 2022-10-13 PROCEDURE — 99999 PR PBB SHADOW E&M-EST. PATIENT-LVL III: ICD-10-PCS | Mod: PBBFAC,,,

## 2022-10-13 PROCEDURE — 99999 PR PBB SHADOW E&M-EST. PATIENT-LVL III: CPT | Mod: PBBFAC,,,

## 2022-10-13 NOTE — PROGRESS NOTES
Pt was here for blood pressure check. Oxygen is 98. Pulse is 76. Blood pressue 146/88. Is there anything else needed?DJ

## 2022-10-14 ENCOUNTER — DOCUMENTATION ONLY (OUTPATIENT)
Dept: INTERNAL MEDICINE | Facility: CLINIC | Age: 67
End: 2022-10-14
Payer: MEDICARE

## 2022-10-14 RX ORDER — VALSARTAN 320 MG/1
320 TABLET ORAL DAILY
Qty: 30 TABLET | Refills: 0 | Status: SHIPPED | OUTPATIENT
Start: 2022-10-14 | End: 2022-11-09 | Stop reason: SDUPTHER

## 2022-10-14 RX ORDER — CHLORTHALIDONE 25 MG/1
25 TABLET ORAL DAILY
Qty: 30 TABLET | Refills: 0 | Status: SHIPPED | OUTPATIENT
Start: 2022-10-14 | End: 2022-11-09 | Stop reason: SDUPTHER

## 2022-10-14 NOTE — PROGRESS NOTES
Informed pt of changes to blood pressure medication and instruction from Clarita Bhatti NP moving forward. Pt verbalized understanding.

## 2022-10-17 ENCOUNTER — OFFICE VISIT (OUTPATIENT)
Dept: INTERNAL MEDICINE | Facility: CLINIC | Age: 67
End: 2022-10-17
Payer: MEDICARE

## 2022-10-17 ENCOUNTER — TELEPHONE (OUTPATIENT)
Dept: CARDIOLOGY | Facility: CLINIC | Age: 67
End: 2022-10-17
Payer: MEDICARE

## 2022-10-17 ENCOUNTER — LAB VISIT (OUTPATIENT)
Dept: LAB | Facility: HOSPITAL | Age: 67
End: 2022-10-17
Attending: FAMILY MEDICINE
Payer: MEDICARE

## 2022-10-17 VITALS
HEART RATE: 62 BPM | SYSTOLIC BLOOD PRESSURE: 138 MMHG | DIASTOLIC BLOOD PRESSURE: 88 MMHG | WEIGHT: 230.63 LBS | HEIGHT: 66 IN | TEMPERATURE: 98 F | BODY MASS INDEX: 37.06 KG/M2 | OXYGEN SATURATION: 100 %

## 2022-10-17 DIAGNOSIS — I10 ESSENTIAL HYPERTENSION: Chronic | ICD-10-CM

## 2022-10-17 DIAGNOSIS — R80.9 TYPE 2 DIABETES MELLITUS WITH MICROALBUMINURIA, WITH LONG-TERM CURRENT USE OF INSULIN: Chronic | ICD-10-CM

## 2022-10-17 DIAGNOSIS — E78.00 PURE HYPERCHOLESTEROLEMIA: Chronic | ICD-10-CM

## 2022-10-17 DIAGNOSIS — I48.0 PAROXYSMAL ATRIAL FIBRILLATION: Chronic | ICD-10-CM

## 2022-10-17 DIAGNOSIS — Z79.4 TYPE 2 DIABETES MELLITUS WITH MICROALBUMINURIA, WITH LONG-TERM CURRENT USE OF INSULIN: Chronic | ICD-10-CM

## 2022-10-17 DIAGNOSIS — E66.01 CLASS 2 SEVERE OBESITY DUE TO EXCESS CALORIES WITH SERIOUS COMORBIDITY AND BODY MASS INDEX (BMI) OF 37.0 TO 37.9 IN ADULT: Chronic | ICD-10-CM

## 2022-10-17 DIAGNOSIS — E11.29 TYPE 2 DIABETES MELLITUS WITH MICROALBUMINURIA, WITH LONG-TERM CURRENT USE OF INSULIN: Chronic | ICD-10-CM

## 2022-10-17 DIAGNOSIS — I10 ESSENTIAL HYPERTENSION: Primary | ICD-10-CM

## 2022-10-17 DIAGNOSIS — I10 ESSENTIAL HYPERTENSION: Primary | Chronic | ICD-10-CM

## 2022-10-17 DIAGNOSIS — Z23 NEED FOR COVID-19 VACCINE: ICD-10-CM

## 2022-10-17 PROCEDURE — 3066F NEPHROPATHY DOC TX: CPT | Mod: CPTII,S$GLB,, | Performed by: FAMILY MEDICINE

## 2022-10-17 PROCEDURE — 4010F PR ACE/ARB THEARPY RXD/TAKEN: ICD-10-PCS | Mod: CPTII,S$GLB,, | Performed by: FAMILY MEDICINE

## 2022-10-17 PROCEDURE — 99999 PR PBB SHADOW E&M-EST. PATIENT-LVL V: CPT | Mod: PBBFAC,,, | Performed by: FAMILY MEDICINE

## 2022-10-17 PROCEDURE — 3288F PR FALLS RISK ASSESSMENT DOCUMENTED: ICD-10-PCS | Mod: CPTII,S$GLB,, | Performed by: FAMILY MEDICINE

## 2022-10-17 PROCEDURE — 99214 PR OFFICE/OUTPT VISIT, EST, LEVL IV, 30-39 MIN: ICD-10-PCS | Mod: S$GLB,,, | Performed by: FAMILY MEDICINE

## 2022-10-17 PROCEDURE — 99999 PR PBB SHADOW E&M-EST. PATIENT-LVL V: ICD-10-PCS | Mod: PBBFAC,,, | Performed by: FAMILY MEDICINE

## 2022-10-17 PROCEDURE — 1159F MED LIST DOCD IN RCRD: CPT | Mod: CPTII,S$GLB,, | Performed by: FAMILY MEDICINE

## 2022-10-17 PROCEDURE — 1125F AMNT PAIN NOTED PAIN PRSNT: CPT | Mod: CPTII,S$GLB,, | Performed by: FAMILY MEDICINE

## 2022-10-17 PROCEDURE — 84244 ASSAY OF RENIN: CPT | Performed by: FAMILY MEDICINE

## 2022-10-17 PROCEDURE — 3288F FALL RISK ASSESSMENT DOCD: CPT | Mod: CPTII,S$GLB,, | Performed by: FAMILY MEDICINE

## 2022-10-17 PROCEDURE — 3061F PR NEG MICROALBUMINURIA RESULT DOCUMENTED/REVIEW: ICD-10-PCS | Mod: CPTII,S$GLB,, | Performed by: FAMILY MEDICINE

## 2022-10-17 PROCEDURE — 3066F PR DOCUMENTATION OF TREATMENT FOR NEPHROPATHY: ICD-10-PCS | Mod: CPTII,S$GLB,, | Performed by: FAMILY MEDICINE

## 2022-10-17 PROCEDURE — 3072F LOW RISK FOR RETINOPATHY: CPT | Mod: CPTII,S$GLB,, | Performed by: FAMILY MEDICINE

## 2022-10-17 PROCEDURE — 3072F PR LOW RISK FOR RETINOPATHY: ICD-10-PCS | Mod: CPTII,S$GLB,, | Performed by: FAMILY MEDICINE

## 2022-10-17 PROCEDURE — 1125F PR PAIN SEVERITY QUANTIFIED, PAIN PRESENT: ICD-10-PCS | Mod: CPTII,S$GLB,, | Performed by: FAMILY MEDICINE

## 2022-10-17 PROCEDURE — 3079F PR MOST RECENT DIASTOLIC BLOOD PRESSURE 80-89 MM HG: ICD-10-PCS | Mod: CPTII,S$GLB,, | Performed by: FAMILY MEDICINE

## 2022-10-17 PROCEDURE — 3075F SYST BP GE 130 - 139MM HG: CPT | Mod: CPTII,S$GLB,, | Performed by: FAMILY MEDICINE

## 2022-10-17 PROCEDURE — 3061F NEG MICROALBUMINURIA REV: CPT | Mod: CPTII,S$GLB,, | Performed by: FAMILY MEDICINE

## 2022-10-17 PROCEDURE — 36415 COLL VENOUS BLD VENIPUNCTURE: CPT | Performed by: FAMILY MEDICINE

## 2022-10-17 PROCEDURE — 1160F RVW MEDS BY RX/DR IN RCRD: CPT | Mod: CPTII,S$GLB,, | Performed by: FAMILY MEDICINE

## 2022-10-17 PROCEDURE — 1160F PR REVIEW ALL MEDS BY PRESCRIBER/CLIN PHARMACIST DOCUMENTED: ICD-10-PCS | Mod: CPTII,S$GLB,, | Performed by: FAMILY MEDICINE

## 2022-10-17 PROCEDURE — 1101F PT FALLS ASSESS-DOCD LE1/YR: CPT | Mod: CPTII,S$GLB,, | Performed by: FAMILY MEDICINE

## 2022-10-17 PROCEDURE — 4010F ACE/ARB THERAPY RXD/TAKEN: CPT | Mod: CPTII,S$GLB,, | Performed by: FAMILY MEDICINE

## 2022-10-17 PROCEDURE — 3044F HG A1C LEVEL LT 7.0%: CPT | Mod: CPTII,S$GLB,, | Performed by: FAMILY MEDICINE

## 2022-10-17 PROCEDURE — 3075F PR MOST RECENT SYSTOLIC BLOOD PRESS GE 130-139MM HG: ICD-10-PCS | Mod: CPTII,S$GLB,, | Performed by: FAMILY MEDICINE

## 2022-10-17 PROCEDURE — 1101F PR PT FALLS ASSESS DOC 0-1 FALLS W/OUT INJ PAST YR: ICD-10-PCS | Mod: CPTII,S$GLB,, | Performed by: FAMILY MEDICINE

## 2022-10-17 PROCEDURE — 99214 OFFICE O/P EST MOD 30 MIN: CPT | Mod: S$GLB,,, | Performed by: FAMILY MEDICINE

## 2022-10-17 PROCEDURE — 3044F PR MOST RECENT HEMOGLOBIN A1C LEVEL <7.0%: ICD-10-PCS | Mod: CPTII,S$GLB,, | Performed by: FAMILY MEDICINE

## 2022-10-17 PROCEDURE — 3079F DIAST BP 80-89 MM HG: CPT | Mod: CPTII,S$GLB,, | Performed by: FAMILY MEDICINE

## 2022-10-17 PROCEDURE — 1159F PR MEDICATION LIST DOCUMENTED IN MEDICAL RECORD: ICD-10-PCS | Mod: CPTII,S$GLB,, | Performed by: FAMILY MEDICINE

## 2022-10-17 RX ORDER — METFORMIN HYDROCHLORIDE 500 MG/1
500 TABLET, EXTENDED RELEASE ORAL DAILY
Qty: 90 TABLET | Refills: 3 | Status: SHIPPED | OUTPATIENT
Start: 2022-10-17 | End: 2023-08-17 | Stop reason: SDUPTHER

## 2022-10-17 RX ORDER — INSULIN GLARGINE 100 [IU]/ML
30 INJECTION, SOLUTION SUBCUTANEOUS NIGHTLY
Qty: 30 ML | Refills: 1 | Status: SHIPPED | OUTPATIENT
Start: 2022-10-17 | End: 2023-05-18

## 2022-10-17 RX ORDER — POTASSIUM CHLORIDE 750 MG/1
10 TABLET, EXTENDED RELEASE ORAL DAILY
Qty: 90 TABLET | Refills: 3 | Status: SHIPPED | OUTPATIENT
Start: 2022-10-17 | End: 2023-08-17 | Stop reason: SDUPTHER

## 2022-10-17 RX ORDER — ROSUVASTATIN CALCIUM 20 MG/1
20 TABLET, COATED ORAL NIGHTLY
Qty: 90 TABLET | Refills: 3 | Status: SHIPPED | OUTPATIENT
Start: 2022-10-17 | End: 2023-08-17 | Stop reason: SDUPTHER

## 2022-10-17 NOTE — PATIENT INSTRUCTIONS
I recommend that you get the new bi-valent COVID-19 vaccine booster that protects against the Omicron sub-variants of COVID-19.    People who are fully vaccinated are much better protected from severe illness (hospitalization and death) from COVID-19 than people who are unvaccinated or not fully vaccinated.    You can learn more about the COVID-19 vaccine and booster shot options at https://www.[facility].org/coronavirus/vaccine-faqs.    The quickest and easiest way to schedule an appointment for your COVID-19 booster vaccination is by using [facility] or by calling [phone].    Please take care of yourself. You are important to me.

## 2022-10-17 NOTE — ASSESSMENT & PLAN NOTE
Diabetes Management Status    Statin: Taking  ACE/ARB: Taking    Screening or Prevention Patient's value Goal Complete/Controlled?   HgA1C Testing and Control   Lab Results   Component Value Date    HGBA1C 5.9 (H) 08/18/2022      Annually/Less than 8% Yes   Lipid profile : 08/18/2022 Annually Yes   LDL control Lab Results   Component Value Date    LDLCALC 93.6 08/18/2022    Annually/Less than 100 mg/dl  Yes   Nephropathy screening Lab Results   Component Value Date    LABMICR 18.0 08/18/2022     No results found for: PROTEINUA Annually Yes   Blood pressure BP Readings from Last 1 Encounters:   10/17/22 (!) 140/70    Less than 140/90 Yes   Dilated retinal exam : 08/03/2022 Annually Yes   Foot exam   : 07/22/2022 Annually Yes     Lab Results   Component Value Date    HGBA1C 5.9 (H) 08/18/2022    HGBA1C 6.3 (H) 01/31/2022    HGBA1C 15.6 (H) 05/22/2021    EGFRNORACEVR >60.0 09/29/2022    MICALBCREAT 6.0 08/18/2022    LDLCALC 93.6 08/18/2022     No results found for: GLUTAMICACID, CPEPTIDE   Last 6 Patient Entered Readings                                          Most Recent A1c:     There is no flowsheet data to display.        HEALTH MAINTENANCE: Diabetic health maintenance interventions reviewed and are up to date except for:  There are no preventive care reminders to display for this patient.

## 2022-10-17 NOTE — ASSESSMENT & PLAN NOTE
Lab Results   Component Value Date    CHOL 169 08/18/2022    CHOL 196 01/31/2022    TRIG 77 08/18/2022    TRIG 101 01/31/2022    HDL 60 08/18/2022    HDL 49 01/31/2022    LDLCALC 93.6 08/18/2022    LDLCALC 126.8 01/31/2022    NONHDLCHOL 109 08/18/2022    NONHDLCHOL 147 01/31/2022    AST 16 08/18/2022    ALT 14 08/18/2022     The 10-year ASCVD risk score (Sharmila CHENEY, et al., 2019) is: 23.4%    Values used to calculate the score:      Age: 67 years      Sex: Female      Is Non- : Yes      Diabetic: Yes      Tobacco smoker: No      Systolic Blood Pressure: 140 mmHg      Is BP treated: Yes      HDL Cholesterol: 60 mg/dL      Total Cholesterol: 169 mg/dL

## 2022-10-17 NOTE — ASSESSMENT & PLAN NOTE
"  Wt Readings from Last 6 Encounters:   10/17/22 104.6 kg (230 lb 9.6 oz)   10/06/22 103.8 kg (228 lb 13.4 oz)   09/29/22 102.7 kg (226 lb 6.6 oz)   08/31/22 101.6 kg (224 lb)   07/22/22 102 kg (224 lb 13.9 oz)   02/02/22 106.3 kg (234 lb 5.6 oz)     Estimated body mass index is 37.22 kg/m² as calculated from the following:    Height as of this encounter: 5' 6" (1.676 m).    Weight as of this encounter: 104.6 kg (230 lb 9.6 oz).   Therapeutic lifestyle changes encouraged. Recommended referral to [facility] clinic.   "

## 2022-10-17 NOTE — Clinical Note
Dr. Sherman Fischer.  Lacey has an appointment with you scheduled for this Thursday.  Her BP is not well-controlled. I'm ordering an aldosterone/renin ratio to be done today.  REQUEST: Would you please re-order her BP meds with the changes you recommend? (You can assign the subsequent refills to me.)  I have her returning to my office in early December to re-evaluate her BP, and we can handle subsequent refills then.  Thank you for your help caring for our patients!  -KATHLEEN

## 2022-10-17 NOTE — PROGRESS NOTES
OFFICE VISIT 10/17/22 10:30 AM CDT    CHIEF COMPLAINT: Follow-up    Lacey reports that her chronic conditions are stable, and she reports no new complaints or concerns. She says she is doing well on her current medicines, she reports preceiving no adverse side-effects.    She was seen in ED in August for syncopal episode. No recurrence since then. Has cardiology appointment later this week. PLAN: Check renin/angiotensin lab. Ask Dr. Whitaker for recommendations for next step in BP Tx.    BP not optimally controlled, but appears to be tolerating current BP med regimen well.    DM much improved, well controlled.    DIAGNOSES SPECIFICALLY EVALUATED AND TREATED THIS ENCOUNTER  1. Essential hypertension  - Aldosterone/Renin Activity Ratio; Future  - Comprehensive Metabolic Panel; Future  - potassium chloride SA (K-DUR,KLOR-CON M) 10 MEQ tablet; Take 1 tablet (10 mEq total) by mouth once daily.  Dispense: 90 tablet; Refill: 3    2. Pure hypercholesterolemia  - Comprehensive Metabolic Panel; Future  - rosuvastatin (CRESTOR) 20 MG tablet; Take 1 tablet (20 mg total) by mouth every evening.  Dispense: 90 tablet; Refill: 3    3. Type 2 diabetes mellitus with microalbuminuria, with long-term current use of insulin  - Hemoglobin A1C; Future  - Comprehensive Metabolic Panel; Future  - CBC Without Differential; Future  - LANTUS SOLOSTAR U-100 INSULIN glargine 100 units/mL SubQ pen; Inject 30 Units into the skin nightly.  Dispense: 30 mL; Refill: 1  - metFORMIN (GLUCOPHAGE-XR) 500 MG ER 24hr tablet; Take 1 tablet (500 mg total) by mouth once daily.  Dispense: 90 tablet; Refill: 3    4. Paroxysmal atrial fibrillation  - Comprehensive Metabolic Panel; Future  - CBC Without Differential; Future  - apixaban (ELIQUIS) 5 mg Tab; Take 1 tablet (5 mg total) by mouth 2 (two) times daily.  Dispense: 180 tablet; Refill: 0    5. Class 2 severe obesity due to excess calories with serious comorbidity and body mass index (BMI) of 37.0 to 37.9 in  "adult  - Ambulatory referral/consult to [facility]; Future    6. Need for COVID-19 vaccine     --------------------------------------------------------------------------------   JUANCHO ORTEGA (MRN [id number], APPT: 10/17/22 10:30 AM CDT)  --------------------------------  Ready to check out. See orders.  Send to lab today for aldosterone/renin activity ratio.  Schedule other labs to be done in mid-February and F/U with me a few days later.  Schedule appointment with Clayton Fonseca NP in early December for   Highlight patient instructions on AVS.  Thanks.  --------------------------------------------------------------------------------        Follow up in about 4 months (around 2/23/2023) for re-evaluate problem(s) discussed today, wellness and preventive services.   Future Appointments   Date Time Provider Department Center   10/20/2022  8:20 AM David Whitaker MD [facility] CARDIO [place]   10/27/2022  9:00 AM NURSE, [facility] INTERNAL MEDICINE [facility] IM [place]   11/3/2022 10:30 AM Braydon Root MD [facility] SPMED[facility] [place]   12/9/2022  9:45 AM Tanner Haque MD [facility] [place]     Problem List Items Addressed This Visit       Class 2 severe obesity due to excess calories with serious comorbidity and body mass index (BMI) of 37.0 to 37.9 in adult (Chronic)    Current Assessment & Plan       Wt Readings from Last 6 Encounters:   10/17/22 104.6 kg (230 lb 9.6 oz)   10/06/22 103.8 kg (228 lb 13.4 oz)   09/29/22 102.7 kg (226 lb 6.6 oz)   08/31/22 101.6 kg (224 lb)   07/22/22 102 kg (224 lb 13.9 oz)   02/02/22 106.3 kg (234 lb 5.6 oz)     Estimated body mass index is 37.22 kg/m² as calculated from the following:    Height as of this encounter: 5' 6" (1.676 m).    Weight as of this encounter: 104.6 kg (230 lb 9.6 oz).   Therapeutic lifestyle changes encouraged. Recommended referral to Lifestyle & Wellness clinic.          Relevant Orders    Ambulatory referral/consult to [facility]    " Essential hypertension - Primary (Chronic)    Current Assessment & Plan       BP Readings from Last 6 Encounters:   10/17/22 138/88   10/13/22 (!) 146/88   10/06/22 (!) 166/99   10/06/22 (!) 158/92   09/29/22 (!) 148/92   08/18/22 (!) 152/92      Last 5 Patient Entered Readings                Current 30 Day Average:   Recent Readings        SBP (mmHg)        DBP (mmHg)        Pulse                      Lab Results   Component Value Date    EGFRNORACEVR >60.0 09/29/2022    CREATININE 0.8 09/29/2022    BUN 17 09/29/2022    K 4.4 09/29/2022     09/29/2022     09/29/2022     Results for orders placed or performed during the hospital encounter of 07/15/21   EKG 12-lead    Collection Time: 07/15/21  8:14 AM    Narrative    Test Reason : I48.91,    Vent. Rate : 093 BPM     Atrial Rate : 093 BPM     P-R Int : 166 ms          QRS Dur : 072 ms      QT Int : 352 ms       P-R-T Axes : 089 032 004 degrees     QTc Int : 437 ms    Normal sinus rhythm  Nonspecific ST and T wave abnormality  Abnormal ECG  When compared with ECG of 13-NOV-2020 09:41,  Premature ventricular complexes are no longer Present  Confirmed by DARCIE LIMA MD ([id number]) on 7/16/2021 5:26:38 PM    Referred By: CHARLENE ZHENG           Confirmed By:DARCIE LIMA MD              Relevant Medications    potassium chloride SA (K-DUR,KLOR-CON M) 10 MEQ tablet    Other Relevant Orders    Aldosterone/Renin Activity Ratio    Comprehensive Metabolic Panel    Paroxysmal atrial fibrillation (Chronic)    Current Assessment & Plan     Rate controlled. Anticoagulated  With apixaban.         Relevant Medications    apixaban (ELIQUIS) 5 mg Tab    Other Relevant Orders    Comprehensive Metabolic Panel    CBC Without Differential    Pure hypercholesterolemia (Chronic)    Current Assessment & Plan     Lab Results   Component Value Date    CHOL 169 08/18/2022    CHOL 196 01/31/2022    TRIG 77 08/18/2022    TRIG 101 01/31/2022    HDL 60 08/18/2022    HDL 49 01/31/2022     LDLCALC 93.6 08/18/2022    LDLCALC 126.8 01/31/2022    NONHDLCHOL 109 08/18/2022    NONHDLCHOL 147 01/31/2022    AST 16 08/18/2022    ALT 14 08/18/2022   The 10-year ASCVD risk score (Sharmila CHENEY, et al., 2019) is: 23.4%    Values used to calculate the score:      Age: 67 years      Sex: Female      Is Non- : Yes      Diabetic: Yes      Tobacco smoker: No      Systolic Blood Pressure: 140 mmHg      Is BP treated: Yes      HDL Cholesterol: 60 mg/dL      Total Cholesterol: 169 mg/dL          Relevant Medications    rosuvastatin (CRESTOR) 20 MG tablet    Other Relevant Orders    Comprehensive Metabolic Panel    Type 2 diabetes mellitus with microalbuminuria, with long-term current use of insulin (Chronic)    Current Assessment & Plan     Diabetes Management Status    Statin: Taking  ACE/ARB: Taking    Screening or Prevention Patient's value Goal Complete/Controlled?   HgA1C Testing and Control   Lab Results   Component Value Date    HGBA1C 5.9 (H) 08/18/2022      Annually/Less than 8% Yes   Lipid profile : 08/18/2022 Annually Yes   LDL control Lab Results   Component Value Date    LDLCALC 93.6 08/18/2022    Annually/Less than 100 mg/dl  Yes   Nephropathy screening Lab Results   Component Value Date    LABMICR 18.0 08/18/2022     No results found for: PROTEINUA Annually Yes   Blood pressure BP Readings from Last 1 Encounters:   10/17/22 (!) 140/70    Less than 140/90 Yes   Dilated retinal exam : 08/03/2022 Annually Yes   Foot exam   : 07/22/2022 Annually Yes     Lab Results   Component Value Date    HGBA1C 5.9 (H) 08/18/2022    HGBA1C 6.3 (H) 01/31/2022    HGBA1C 15.6 (H) 05/22/2021    EGFRNORACEVR >60.0 09/29/2022    MICALBCREAT 6.0 08/18/2022    LDLCALC 93.6 08/18/2022   No results found for: GLUTAMICACID, CPEPTIDE   Last 6 Patient Entered Readings                                          Most Recent A1c:     There is no flowsheet data to display.        HEALTH MAINTENANCE: Diabetic health  "maintenance interventions reviewed and are up to date except for:  There are no preventive care reminders to display for this patient.          Relevant Medications    LANTUS SOLOSTAR U-100 INSULIN glargine 100 units/mL SubQ pen    metFORMIN (GLUCOPHAGE-XR) 500 MG ER 24hr tablet    Other Relevant Orders    Hemoglobin A1C    Comprehensive Metabolic Panel    CBC Without Differential     Other Visit Diagnoses       Need for COVID-19 vaccine            Unless noted herein, any chronic conditions are represented as and appear compensated/controlled and stable, and no other significant complaints or concerns were reported.    PRESCRIPTION DRUG MANAGEMENT  Outpatient Medications Prior to Visit   Medication Sig Dispense Refill    amLODIPine (NORVASC) 10 MG tablet Take 1 tablet (10 mg total) by mouth once daily. 30 tablet 0    BD JANE 2ND GEN PEN NEEDLE 32 gauge x 5/32" Ndle Use as directed to inject insulin as prescribed by [facility] provider. 200 each 11    blood sugar diagnostic Strp Check blood glucose 4 times daily (BEFORE breakfast or other meal) and as needed for symptoms of low or high blood glucose 200 each 11    blood-glucose meter kit Check blood glucose 4 times daily (BEFORE breakfast or other meal) and as needed for symptoms of low or high blood glucose 1 each 0    chlorthalidone (HYGROTEN) 25 MG Tab Take 1 tablet (25 mg total) by mouth once daily. 30 tablet 0    dorzolamide-timolol 2-0.5% (COSOPT) 22.3-6.8 mg/mL ophthalmic solution Place 1 drop into both eyes every 12 (twelve) hours. 10 mL 6    empty container (SHARPS CONTAINER) Misc Use to dispose sharps      insulin lispro 100 unit/mL pen Inject 9 Units into the skin 3 (three) times daily before meals. 15 mL 2    lancets Misc Check blood glucose 4 times daily (BEFORE breakfast or other meal) and as needed for symptoms of low or high blood glucose 200 each 11    metoprolol succinate (TOPROL-XL) 25 MG 24 hr tablet Take 1 tablet (25 mg total) by mouth every " "evening. 90 tablet 0    PNEUMOVAX-23 25 mcg/0.5 mL vaccine       valsartan (DIOVAN) 320 MG tablet Take 1 tablet (320 mg total) by mouth once daily. 30 tablet 0    apixaban (ELIQUIS) 5 mg Tab Take 1 tablet (5 mg total) by mouth 2 (two) times daily. 180 tablet 0    LANTUS SOLOSTAR U-100 INSULIN glargine 100 units/mL SubQ pen Inject 30 Units into the skin nightly. 30 mL 0    metFORMIN (GLUCOPHAGE-XR) 500 MG ER 24hr tablet Take 1 tablet (500 mg total) by mouth once daily. 90 tablet 3    potassium chloride SA (K-DUR,KLOR-CON) 10 MEQ tablet Take 1 tablet (10 mEq total) by mouth once daily. 90 tablet 3    rosuvastatin (CRESTOR) 20 MG tablet Take 1 tablet (20 mg total) by mouth every evening. 90 tablet 3    DIPH,PERTUSS,ACEL,TET-VAC,(ADACEL,TDAP ADOLESN/ADULT,PF)2 Lf-(2.5-5-3-5 mcg)-5Lf/0.5 mL Syrg       latanoprost 0.005 % ophthalmic solution Place into both eyes.      traMADoL (ULTRAM) 50 mg tablet Take 50 mg by mouth every 6 (six) hours as needed for Pain.      vitamin D (VITAMIN D3) 1000 units Tab Take 1,000 Units by mouth once daily.      insulin syringe-needle U-100 0.5 mL 31 gauge x 5/16" Syrg Use as directed to inject insulin as prescribed. 100 each 0    varicella-zoster gE-AS01B, PF, (SHINGRIX) 50 mcg/0.5 mL injection Inject 0.5 mL (one dose) into muscle now; give second dose at least 2 months later 1 each 1     No facility-administered medications prior to visit.     Medications Discontinued During This Encounter   Medication Reason    insulin syringe-needle U-100 0.5 mL 31 gauge x 5/16" Syrg Patient no longer taking    varicella-zoster gE-AS01B, PF, (SHINGRIX) 50 mcg/0.5 mL injection Patient no longer taking    rosuvastatin (CRESTOR) 20 MG tablet Reorder    potassium chloride SA (K-DUR,KLOR-CON) 10 MEQ tablet Reorder    metFORMIN (GLUCOPHAGE-XR) 500 MG ER 24hr tablet Reorder    apixaban (ELIQUIS) 5 mg Tab Reorder    LANTUS SOLOSTAR U-100 INSULIN glargine 100 units/mL SubQ pen Reorder     Medications Ordered This " "Encounter   Medications    apixaban (ELIQUIS) 5 mg Tab     Sig: Take 1 tablet (5 mg total) by mouth 2 (two) times daily.     Dispense:  180 tablet     Refill:  0     -    LANTUS SOLOSTAR U-100 INSULIN glargine 100 units/mL SubQ pen     Sig: Inject 30 Units into the skin nightly.     Dispense:  30 mL     Refill:  1     -    metFORMIN (GLUCOPHAGE-XR) 500 MG ER 24hr tablet     Sig: Take 1 tablet (500 mg total) by mouth once daily.     Dispense:  90 tablet     Refill:  3     -    potassium chloride SA (K-DUR,KLOR-CON M) 10 MEQ tablet     Sig: Take 1 tablet (10 mEq total) by mouth once daily.     Dispense:  90 tablet     Refill:  3     -    rosuvastatin (CRESTOR) 20 MG tablet     Sig: Take 1 tablet (20 mg total) by mouth every evening.     Dispense:  90 tablet     Refill:  3     -     PHYSICAL EXAM  Vitals:    10/17/22 1111 10/17/22 1134   BP: (!) 140/70 138/88   BP Location: Left arm Left arm   Patient Position: Sitting Sitting   BP Method: X-Large (Manual) Thigh Cuff (Manual)   Pulse: 62    Temp: 98.1 °F (36.7 °C)    TempSrc: Tympanic    SpO2: 100%    Weight: 104.6 kg (230 lb 9.6 oz)    Height: 5' 6" (1.676 m)    CONSTITUTIONAL: Vital signs noted. Appears well.  PSYCH: Alert and conversant. Mood is grossly neutral. Affect appropriate.  PULM: Breathing unlabored.  HEART: Regular.     Documentation entered by me for this encounter may have been done in part using speech-recognition technology. Although I have made an effort to ensure accuracy, "sound like" errors may exist and should be interpreted in context.  "

## 2022-10-17 NOTE — ASSESSMENT & PLAN NOTE
BP Readings from Last 6 Encounters:   10/17/22 138/88   10/13/22 (!) 146/88   10/06/22 (!) 166/99   10/06/22 (!) 158/92   09/29/22 (!) 148/92   08/18/22 (!) 152/92      Last 5 Patient Entered Readings                Current 30 Day Average:   Recent Readings        SBP (mmHg)        DBP (mmHg)        Pulse                    Lab Results   Component Value Date    EGFRNORACEVR >60.0 09/29/2022    CREATININE 0.8 09/29/2022    BUN 17 09/29/2022    K 4.4 09/29/2022     09/29/2022     09/29/2022     Results for orders placed or performed during the hospital encounter of 07/15/21   EKG 12-lead    Collection Time: 07/15/21  8:14 AM    Narrative    Test Reason : I48.91,    Vent. Rate : 093 BPM     Atrial Rate : 093 BPM     P-R Int : 166 ms          QRS Dur : 072 ms      QT Int : 352 ms       P-R-T Axes : 089 032 004 degrees     QTc Int : 437 ms    Normal sinus rhythm  Nonspecific ST and T wave abnormality  Abnormal ECG  When compared with ECG of 13-NOV-2020 09:41,  Premature ventricular complexes are no longer Present  Confirmed by DARCIE LIMA MD ([id number]) on 7/16/2021 5:26:38 PM    Referred By: CHARLENE ZHENG           Confirmed By:DARCIE LIMA MD

## 2022-10-18 NOTE — TELEPHONE ENCOUNTER
Dr. Sherman Fischer.     Lacey has an appointment with you scheduled for this Thursday.     Her BP is not well-controlled. I'm ordering an aldosterone/renin ratio to be done today.     REQUEST: Would you please re-order her BP meds with the changes you recommend? (You can assign the subsequent refills to me.)     I have her returning to my office in early December to re-evaluate her BP, and we can handle subsequent refills then.     Thank you for your help caring for our patients!

## 2022-10-20 ENCOUNTER — HOSPITAL ENCOUNTER (OUTPATIENT)
Dept: CARDIOLOGY | Facility: HOSPITAL | Age: 67
Discharge: HOME | End: 2022-10-20
Attending: INTERNAL MEDICINE
Payer: MEDICARE

## 2022-10-20 ENCOUNTER — OFFICE VISIT (OUTPATIENT)
Dept: CARDIOLOGY | Facility: CLINIC | Age: 67
End: 2022-10-20
Payer: MEDICARE

## 2022-10-20 VITALS
DIASTOLIC BLOOD PRESSURE: 92 MMHG | OXYGEN SATURATION: 99 % | BODY MASS INDEX: 36.15 KG/M2 | HEART RATE: 77 BPM | WEIGHT: 224 LBS | SYSTOLIC BLOOD PRESSURE: 146 MMHG

## 2022-10-20 DIAGNOSIS — R80.9 TYPE 2 DIABETES MELLITUS WITH MICROALBUMINURIA, WITH LONG-TERM CURRENT USE OF INSULIN: Chronic | ICD-10-CM

## 2022-10-20 DIAGNOSIS — I48.0 PAROXYSMAL ATRIAL FIBRILLATION: Chronic | ICD-10-CM

## 2022-10-20 DIAGNOSIS — Z01.818 PREOP EXAM FOR INTERNAL MEDICINE: Primary | ICD-10-CM

## 2022-10-20 DIAGNOSIS — E11.29 TYPE 2 DIABETES MELLITUS WITH MICROALBUMINURIA, WITH LONG-TERM CURRENT USE OF INSULIN: Chronic | ICD-10-CM

## 2022-10-20 DIAGNOSIS — I10 ESSENTIAL HYPERTENSION: Chronic | ICD-10-CM

## 2022-10-20 DIAGNOSIS — E78.00 PURE HYPERCHOLESTEROLEMIA: Chronic | ICD-10-CM

## 2022-10-20 DIAGNOSIS — Z79.4 TYPE 2 DIABETES MELLITUS WITH MICROALBUMINURIA, WITH LONG-TERM CURRENT USE OF INSULIN: Chronic | ICD-10-CM

## 2022-10-20 DIAGNOSIS — I10 ESSENTIAL HYPERTENSION: ICD-10-CM

## 2022-10-20 DIAGNOSIS — E11.65 TYPE 2 DIABETES MELLITUS WITH HYPERGLYCEMIA, WITH LONG-TERM CURRENT USE OF INSULIN: ICD-10-CM

## 2022-10-20 DIAGNOSIS — Z79.4 TYPE 2 DIABETES MELLITUS WITH HYPERGLYCEMIA, WITH LONG-TERM CURRENT USE OF INSULIN: ICD-10-CM

## 2022-10-20 PROBLEM — Z01.810 PREOP CARDIOVASCULAR EXAM: Status: ACTIVE | Noted: 2022-09-29

## 2022-10-20 LAB
ALDOST SERPL-MCNC: 15.7 NG/DL
ALDOST/RENIN PLAS-RTO: 78.5 RATIO
RENIN PLAS-CCNC: 0.2 NG/ML/HR

## 2022-10-20 PROCEDURE — 3044F PR MOST RECENT HEMOGLOBIN A1C LEVEL <7.0%: ICD-10-PCS | Mod: CPTII,S$GLB,, | Performed by: INTERNAL MEDICINE

## 2022-10-20 PROCEDURE — 3080F DIAST BP >= 90 MM HG: CPT | Mod: CPTII,S$GLB,, | Performed by: INTERNAL MEDICINE

## 2022-10-20 PROCEDURE — 3061F PR NEG MICROALBUMINURIA RESULT DOCUMENTED/REVIEW: ICD-10-PCS | Mod: CPTII,S$GLB,, | Performed by: INTERNAL MEDICINE

## 2022-10-20 PROCEDURE — 1159F PR MEDICATION LIST DOCUMENTED IN MEDICAL RECORD: ICD-10-PCS | Mod: CPTII,S$GLB,, | Performed by: INTERNAL MEDICINE

## 2022-10-20 PROCEDURE — 99214 OFFICE O/P EST MOD 30 MIN: CPT | Mod: S$GLB,,, | Performed by: INTERNAL MEDICINE

## 2022-10-20 PROCEDURE — 93010 ELECTROCARDIOGRAM REPORT: CPT | Mod: ,,, | Performed by: INTERNAL MEDICINE

## 2022-10-20 PROCEDURE — 3044F HG A1C LEVEL LT 7.0%: CPT | Mod: CPTII,S$GLB,, | Performed by: INTERNAL MEDICINE

## 2022-10-20 PROCEDURE — 3077F PR MOST RECENT SYSTOLIC BLOOD PRESSURE >= 140 MM HG: ICD-10-PCS | Mod: CPTII,S$GLB,, | Performed by: INTERNAL MEDICINE

## 2022-10-20 PROCEDURE — 1159F MED LIST DOCD IN RCRD: CPT | Mod: CPTII,S$GLB,, | Performed by: INTERNAL MEDICINE

## 2022-10-20 PROCEDURE — 1160F RVW MEDS BY RX/DR IN RCRD: CPT | Mod: CPTII,S$GLB,, | Performed by: INTERNAL MEDICINE

## 2022-10-20 PROCEDURE — 99999 PR PBB SHADOW E&M-EST. PATIENT-LVL IV: ICD-10-PCS | Mod: PBBFAC,,, | Performed by: INTERNAL MEDICINE

## 2022-10-20 PROCEDURE — 3061F NEG MICROALBUMINURIA REV: CPT | Mod: CPTII,S$GLB,, | Performed by: INTERNAL MEDICINE

## 2022-10-20 PROCEDURE — 4010F PR ACE/ARB THEARPY RXD/TAKEN: ICD-10-PCS | Mod: CPTII,S$GLB,, | Performed by: INTERNAL MEDICINE

## 2022-10-20 PROCEDURE — 3072F PR LOW RISK FOR RETINOPATHY: ICD-10-PCS | Mod: CPTII,S$GLB,, | Performed by: INTERNAL MEDICINE

## 2022-10-20 PROCEDURE — 3077F SYST BP >= 140 MM HG: CPT | Mod: CPTII,S$GLB,, | Performed by: INTERNAL MEDICINE

## 2022-10-20 PROCEDURE — 1160F PR REVIEW ALL MEDS BY PRESCRIBER/CLIN PHARMACIST DOCUMENTED: ICD-10-PCS | Mod: CPTII,S$GLB,, | Performed by: INTERNAL MEDICINE

## 2022-10-20 PROCEDURE — 3066F NEPHROPATHY DOC TX: CPT | Mod: CPTII,S$GLB,, | Performed by: INTERNAL MEDICINE

## 2022-10-20 PROCEDURE — 3066F PR DOCUMENTATION OF TREATMENT FOR NEPHROPATHY: ICD-10-PCS | Mod: CPTII,S$GLB,, | Performed by: INTERNAL MEDICINE

## 2022-10-20 PROCEDURE — 3072F LOW RISK FOR RETINOPATHY: CPT | Mod: CPTII,S$GLB,, | Performed by: INTERNAL MEDICINE

## 2022-10-20 PROCEDURE — 99999 PR PBB SHADOW E&M-EST. PATIENT-LVL IV: CPT | Mod: PBBFAC,,, | Performed by: INTERNAL MEDICINE

## 2022-10-20 PROCEDURE — 93010 EKG 12-LEAD: ICD-10-PCS | Mod: ,,, | Performed by: INTERNAL MEDICINE

## 2022-10-20 PROCEDURE — 4010F ACE/ARB THERAPY RXD/TAKEN: CPT | Mod: CPTII,S$GLB,, | Performed by: INTERNAL MEDICINE

## 2022-10-20 PROCEDURE — 3080F PR MOST RECENT DIASTOLIC BLOOD PRESSURE >= 90 MM HG: ICD-10-PCS | Mod: CPTII,S$GLB,, | Performed by: INTERNAL MEDICINE

## 2022-10-20 PROCEDURE — 99214 PR OFFICE/OUTPT VISIT, EST, LEVL IV, 30-39 MIN: ICD-10-PCS | Mod: S$GLB,,, | Performed by: INTERNAL MEDICINE

## 2022-10-20 PROCEDURE — 93005 ELECTROCARDIOGRAM TRACING: CPT

## 2022-10-20 NOTE — PROGRESS NOTES
Subjective:   Patient ID:  Lacey Calderón is a 67 y.o. female who presents for follow up of No chief complaint on file.        66 yo female, came in for HTN  PMH PAF HTn HLD DM Dx in 2022, f/u Dr. Saldana at cardiology [facility]  Today ekg NSR   echo at [facility] normal EF and LA size  BP fluctuated and med compliance  Added Valsartan and chlorthalidone one week ago. Dizziness improved.   Will have the dental work      Past Medical History:   Diagnosis Date    Arthritis     Cardiac arrhythmia 11/13/2020    Cataract     Class 2 severe obesity due to excess calories with serious comorbidity and body mass index (BMI) of 37.0 to 37.9 in adult 10/19/2020    Essential hypertension 10/19/2020    Hypertension     Paroxysmal atrial fibrillation 11/13/2020    Primary localized osteoarthritis of knees, bilateral 10/19/2020    Pure hypercholesterolemia 10/24/2020    Pure hypercholesterolemia 10/24/2020    Type 2 diabetes mellitus with hyperglycemia, with long-term current use of insulin 6/3/2021       History reviewed. No pertinent surgical history.    Social History     Tobacco Use    Smoking status: Never    Smokeless tobacco: Never   Substance Use Topics    Alcohol use: Never    Drug use: Never       Family History   Problem Relation Age of Onset    Arthritis Mother     Heart disease Mother     Miscarriages / Stillbirths Mother     Dementia Mother     Glaucoma Mother     Cataracts Mother     No Known Problems Father          Review of Systems   Constitutional: Negative for decreased appetite, diaphoresis, fever, malaise/fatigue and night sweats.   HENT:  Negative for nosebleeds.    Eyes:  Negative for blurred vision and double vision.   Cardiovascular:  Negative for chest pain, claudication, dyspnea on exertion, irregular heartbeat, leg swelling, near-syncope, orthopnea, palpitations, paroxysmal nocturnal dyspnea and syncope.   Respiratory:  Negative for cough, shortness of breath, sleep disturbances due to breathing, snoring,  sputum production and wheezing.    Endocrine: Negative for cold intolerance and polyuria.   Hematologic/Lymphatic: Does not bruise/bleed easily.   Skin:  Negative for rash.   Musculoskeletal:  Positive for arthritis and back pain. Negative for falls, joint pain, joint swelling and neck pain.   Gastrointestinal:  Negative for abdominal pain, heartburn, nausea and vomiting.   Genitourinary:  Negative for dysuria, frequency and hematuria.   Neurological:  Negative for difficulty with concentration, dizziness, focal weakness, headaches, light-headedness, numbness, seizures and weakness.   Psychiatric/Behavioral:  Negative for depression, memory loss and substance abuse. The patient does not have insomnia.    Allergic/Immunologic: Negative for HIV exposure and hives.     Objective:   Physical Exam  HENT:      Head: Normocephalic.   Eyes:      Pupils: Pupils are equal, round, and reactive to light.   Neck:      Thyroid: No thyromegaly.      Vascular: Normal carotid pulses. No carotid bruit or JVD.   Cardiovascular:      Rate and Rhythm: Normal rate and regular rhythm. No extrasystoles are present.     Chest Wall: PMI is not displaced.      Pulses: Normal pulses.      Heart sounds: Normal heart sounds. No murmur heard.    No gallop. No S3 sounds.   Pulmonary:      Effort: No respiratory distress.      Breath sounds: Normal breath sounds. No stridor.   Abdominal:      General: Bowel sounds are normal.      Palpations: Abdomen is soft.      Tenderness: There is no abdominal tenderness. There is no rebound.   Skin:     General: Skin is warm and dry.      Findings: No rash.   Neurological:      Mental Status: She is alert and oriented to person, place, and time.   Psychiatric:         Behavior: Behavior normal.       Lab Results   Component Value Date    CHOL 169 08/18/2022    CHOL 196 01/31/2022    CHOL 255 (H) 10/19/2020     Lab Results   Component Value Date    HDL 60 08/18/2022    HDL 49 01/31/2022    HDL 66 10/19/2020      Lab Results   Component Value Date    LDLCALC 93.6 08/18/2022    LDLCALC 126.8 01/31/2022    LDLCALC 169.2 (H) 10/19/2020     Lab Results   Component Value Date    TRIG 77 08/18/2022    TRIG 101 01/31/2022    TRIG 99 10/19/2020     Lab Results   Component Value Date    CHOLHDL 35.5 08/18/2022    CHOLHDL 25.0 01/31/2022    CHOLHDL 25.9 10/19/2020       Chemistry        Component Value Date/Time     09/29/2022 1040    K 4.4 09/29/2022 1040     09/29/2022 1040    CO2 26 09/29/2022 1040    BUN 17 09/29/2022 1040    CREATININE 0.8 09/29/2022 1040    GLU 94 09/29/2022 1040        Component Value Date/Time    CALCIUM 10.5 09/29/2022 1040    ALKPHOS 77 08/18/2022 0735    AST 16 08/18/2022 0735    ALT 14 08/18/2022 0735    BILITOT 0.5 08/18/2022 0735    ESTGFRAFRICA >60.0 01/31/2022 1039    EGFRNONAA >60.0 01/31/2022 1039          Lab Results   Component Value Date    HGBA1C 5.9 (H) 08/18/2022     Lab Results   Component Value Date    TSH 2.253 10/19/2020     No results found for: INR, PROTIME  No results found for: WBC, HGB, HCT, MCV, PLT  BMP  Sodium   Date Value Ref Range Status   09/29/2022 140 136 - 145 mmol/L Final     Potassium   Date Value Ref Range Status   09/29/2022 4.4 3.5 - 5.1 mmol/L Final     Chloride   Date Value Ref Range Status   09/29/2022 105 95 - 110 mmol/L Final     CO2   Date Value Ref Range Status   09/29/2022 26 23 - 29 mmol/L Final     BUN   Date Value Ref Range Status   09/29/2022 17 8 - 23 mg/dL Final     Creatinine   Date Value Ref Range Status   09/29/2022 0.8 0.5 - 1.4 mg/dL Final     Calcium   Date Value Ref Range Status   09/29/2022 10.5 8.7 - 10.5 mg/dL Final     Anion Gap   Date Value Ref Range Status   09/29/2022 9 8 - 16 mmol/L Final     eGFR if    Date Value Ref Range Status   01/31/2022 >60.0 >60 mL/min/1.73 m^2 Final     eGFR if non    Date Value Ref Range Status   01/31/2022 >60.0 >60 mL/min/1.73 m^2 Final     Comment:     Calculation  used to obtain the estimated glomerular filtration  rate (eGFR) is the CKD-EPI equation.        BNP  @LABRCNTIP(BNP,BNPTRIAGEBLO)@  @LABRCNTIP(troponini)@  CrCl cannot be calculated (Patient's most recent lab result is older than the maximum 7 days allowed.).  No results found in the last 24 hours.  No results found in the last 24 hours.  No results found in the last 24 hours.    Assessment:      1. Preop exam for internal medicine    2. Paroxysmal atrial fibrillation    3. Essential hypertension    4. Type 2 diabetes mellitus with microalbuminuria, with long-term current use of insulin    5. Type 2 diabetes mellitus with hyperglycemia, with long-term current use of insulin    6. Pure hypercholesterolemia      BP high  LDL and A1c controlled  Preop for dental work  Plan:   Ok to hold eliquis 2 days before the dental work and resume once hemodynamically stable      D/c toprolXL and add coreg 6.25 mg bid  Continue amlodipine vlasartan and Chlorthalidone  Check BP at home and KeeP BP< 140 mmHg    DM Rx per PCP  Counseled DASH  Check Lipid profile in 6 months  Recommend heart-healthy diet, weight control and regular exercise.  Bladimir. Risk modification.   I have reviewed all pertinent labs and cardiac studies independently. Plans and recommendations have been formulated under my direct supervision. All questions answered and patient voiced understanding.   If symptoms persist go to the ED  RTC in 6 months

## 2022-10-27 ENCOUNTER — TELEPHONE (OUTPATIENT)
Dept: INTERNAL MEDICINE | Facility: CLINIC | Age: 67
End: 2022-10-27

## 2022-10-27 ENCOUNTER — CLINICAL SUPPORT (OUTPATIENT)
Dept: INTERNAL MEDICINE | Facility: CLINIC | Age: 67
End: 2022-10-27
Payer: MEDICARE

## 2022-10-27 ENCOUNTER — TELEPHONE (OUTPATIENT)
Dept: SPORTS MEDICINE | Facility: CLINIC | Age: 67
End: 2022-10-27
Payer: MEDICARE

## 2022-10-27 VITALS — SYSTOLIC BLOOD PRESSURE: 132 MMHG | DIASTOLIC BLOOD PRESSURE: 98 MMHG

## 2022-10-27 DIAGNOSIS — I10 ESSENTIAL HYPERTENSION: Primary | Chronic | ICD-10-CM

## 2022-10-27 NOTE — TELEPHONE ENCOUNTER
"TO MY TEAM:   Please contact Lacey and relay message below.  Let me know her response to my recommended cardiology E-consult.  If she agrees to cardiology E-consult, you can cancel Nurse Visit blood pressure check scheduled for 03 NOV.  Thanks.   --------------------------------------------------------------------------------   Lacey Fischer.    Thanks for coming in and letting my nurse re-check your blood pressure.    Your blood pressure is a little improved, but still not well-controlled, even though you are on four good blood pressure medicines.    We know that people whose blood pressure is well-controlled are less likely to have heart attacks and strokes, so with your permission, I would like to consult your cardiologist for their assessment and recommendations.    This type of consultation is called an "E-consult," and we typically get a response within 5 business days. E-consults are generally paid for by health insurance, with your co-pay being the same as it would be if you had seen the specialist in the office.    Thanks for letting me care for you, and thanks for trusting [facility] with your healthcare needs.    All the best,    JAKE Merrill MD     Future Appointments   Date Time Provider Department Center   11/3/2022 10:15 AM NURSE, HG INTERNAL MEDICINE [facility]   11/3/2022 10:30 AM Braydon Root MD HG [facility]   11/10/2022  9:00 AM TANA Vela [facility] LIFE [facility]   12/9/2022  9:45 AM Tanner Haque MD [facility] OPHTHAL [facility]   12/15/2022 11:00 AM Clayton Fonseca NP [facility] IM [facility]   2/9/2023 10:15 AM LABORATORY, HGV [facility] LAB [facility]   4/20/2023  8:20 AM David Whitaker MD HG CARDIO [facility]          "

## 2022-10-27 NOTE — TELEPHONE ENCOUNTER
Spoke with pt to r/s 11/3 appt with Dr. Root as he will not be in clinic that day. Pt agreed to 11/10 appt at 10:30. Pt verbalized understanding of appt date, time, and location.

## 2022-10-27 NOTE — TELEPHONE ENCOUNTER
Patient in office for a blood pressure check per Dr. Merrill. Her first reading was: 140/88. After resting in room for 10 minutes her second reading was: 132/98. Notified Dr. Merrill.

## 2022-10-28 ENCOUNTER — TELEPHONE (OUTPATIENT)
Dept: INTERNAL MEDICINE | Facility: CLINIC | Age: 67
End: 2022-10-28

## 2022-10-28 NOTE — TELEPHONE ENCOUNTER
Recommend: Cardiology e-consult for BP and anticoagulation management for dental procedure.  Let me know if/when she agrees. I'll complete dental form based on cardiology recommendation.    BP Readings from Last 6 Encounters:   10/27/22 (!) 132/98   10/20/22 (!) 146/92   10/17/22 138/88   10/13/22 (!) 146/88   10/06/22 (!) 166/99   10/06/22 (!) 158/92     Future Appointments   Date Time Provider Department Center   11/3/2022 10:15 AM NURSE, HG INTERNAL MEDICINE [facility]   11/10/2022  9:00 AM TANA Vela VC LIFE [facility]   11/10/2022 10:30 AM Braydon Root MD [facility] [place]   12/9/2022  9:45 AM Tanner Haque MD HG OPHTHAL [facility]   12/15/2022 11:00 AM Clayton Fonseca NP [facility] IM [facility]   2/9/2023 10:15 AM LABORATORY, [facility] LAB [facility]   4/20/2023  8:20 AM David Whitaker MD [facility] CARDIO [facility]

## 2022-11-01 ENCOUNTER — PATIENT MESSAGE (OUTPATIENT)
Dept: ADMINISTRATIVE | Facility: HOSPITAL | Age: 67
End: 2022-11-01
Payer: MEDICARE

## 2022-11-02 NOTE — TELEPHONE ENCOUNTER
Spoke with the pt regarding giving permission for a e consent for  to speak with a cardio.The pt states yes she give permission.//LB

## 2022-11-03 ENCOUNTER — PATIENT MESSAGE (OUTPATIENT)
Dept: SPORTS MEDICINE | Facility: CLINIC | Age: 67
End: 2022-11-03
Payer: MEDICARE

## 2022-11-04 ENCOUNTER — TELEPHONE (OUTPATIENT)
Dept: SPORTS MEDICINE | Facility: CLINIC | Age: 67
End: 2022-11-04
Payer: MEDICARE

## 2022-11-04 NOTE — TELEPHONE ENCOUNTER
Spoke with pt to r/s appt with Dr. Root on 11/10. Pt expressed frustration as she had already needed to reschedule this appt. She agreed to 11/15 at 11am. Pt verbalized understanding of appt date, time, and location.      ----- Message from Jackie Jones sent at 11/4/2022 10:13 AM CDT -----  Contact: self/[phone]  Type:  Patient Returning Call    Who Called:Lacey Calderón  Who Left Message for Patient:Yumiko Gibson  Does the patient know what this is regarding?:no  Would the patient rather a call back or a response via [facility]? Call back   Best Call Back Number:[phone]  Additional Information:

## 2022-11-05 RX ORDER — CARVEDILOL 6.25 MG/1
6.25 TABLET ORAL 2 TIMES DAILY
Qty: 60 TABLET | Refills: 1 | Status: SHIPPED | OUTPATIENT
Start: 2022-11-05 | End: 2023-01-24

## 2022-11-05 NOTE — TELEPHONE ENCOUNTER
Cardiology recommendations from 10/20/22: D/c toprolXL and add coreg 6.25 mg bid. Continue amlodipine vlasartan and Chlorthalidone. Check BP at home and KeeP BP< 140 mmHg.    NEW MEDICATION STARTED  Medications Ordered This Encounter    carvediloL (COREG) 6.25 MG tablet     Sig: Take 1 tablet (6.25 mg total) by mouth 2 (two) times daily.     Dispense:  60 tablet     Refill:  1      MEDICATION STOPPED  Medications Discontinued During This Encounter    metoprolol succinate (TOPROL-XL) 25 MG 24 hr tablet Alternate therapy       TO MY TEAM:   Notify her of med change noted above. New eRx sent to [facility] pharmacy.  Schedule her for Nurse Visit blood pressure check on 11/15/2022 and 12/9/2022 (around same time as other appointments).    Future Appointments   Date Time Provider Department Center   11/10/2022  9:00 AM TANA Vela [facility] LIFE [facility]   11/15/2022 11:00 AM Braydon Root MD [facility] [place]   12/9/2022  9:45 AM Tanner Haque MD [facility] OPHTHAL [facility]   12/15/2022 11:00 AM Clayton Fonseca NP [facility]   2/9/2023 10:15 AM LABORATORY, HGV HGVH LAB [facility]   4/20/2023  8:20 AM David Whitaker MD [facility] CARDIO [facility]      BP Readings from Last 6 Encounters:   10/27/22 (!) 132/98   10/20/22 (!) 146/92   10/17/22 138/88   10/13/22 (!) 146/88   10/06/22 (!) 166/99   10/06/22 (!) 158/92

## 2022-11-06 PROBLEM — E26.9 HYPERALDOSTERONISM: Status: ACTIVE | Noted: 2022-11-06

## 2022-11-06 NOTE — PROGRESS NOTES
Relative hyperaldosteronism.   Consider spironolactone if BP not controlled.    Future Appointments  12/15/2022 11:00 AM   Clayton Fonseca NP          [facility]              [place]    Thank you for your help caring for our patients!    -LM

## 2022-11-07 NOTE — TELEPHONE ENCOUNTER
Spoke with patient and she stated that she had already given consent for the e-consult with cardiology to Dr. Merrill and has already had her dental clearance and procedure done.

## 2022-11-08 ENCOUNTER — TELEPHONE (OUTPATIENT)
Dept: INTERNAL MEDICINE | Facility: CLINIC | Age: 67
End: 2022-11-08
Payer: MEDICARE

## 2022-11-09 DIAGNOSIS — R80.9 TYPE 2 DIABETES MELLITUS WITH MICROALBUMINURIA, WITH LONG-TERM CURRENT USE OF INSULIN: ICD-10-CM

## 2022-11-09 DIAGNOSIS — Z79.4 TYPE 2 DIABETES MELLITUS WITH MICROALBUMINURIA, WITH LONG-TERM CURRENT USE OF INSULIN: ICD-10-CM

## 2022-11-09 DIAGNOSIS — E11.29 TYPE 2 DIABETES MELLITUS WITH MICROALBUMINURIA, WITH LONG-TERM CURRENT USE OF INSULIN: ICD-10-CM

## 2022-11-09 DIAGNOSIS — I10 ESSENTIAL HYPERTENSION: Chronic | ICD-10-CM

## 2022-11-09 RX ORDER — INSULIN LISPRO 100 [IU]/ML
9 INJECTION, SOLUTION INTRAVENOUS; SUBCUTANEOUS
Qty: 45 ML | Refills: 0 | Status: SHIPPED | OUTPATIENT
Start: 2022-11-09 | End: 2023-05-18

## 2022-11-09 NOTE — TELEPHONE ENCOUNTER
----- Message from Sheridan Matt sent at 11/9/2022  2:45 PM CST -----  Type:  RX Refill Request    Who Called: pt  Refill or New Rx:refill  RX Name and Strength:valsartan, chlorthalidone, amlodipine and insulin lispro 100 unit/mL pen 9 units, 3XDay  How is the patient currently taking it? (ex. 1XDay):1XDay  Is this a 30 day or 90 day RX:30 days  Preferred Pharmacy with phone number:.  [facility] [address] [facility]  [address] [address]  Phone: [phone] Fax: [phone]  Local or Mail Order:local  Ordering Provider:Dr Merrill   Would the patient rather a call back or a response via [facility]? Call  Best Call Back Number:[phone]  Additional Information: .    Thank you

## 2022-11-09 NOTE — TELEPHONE ENCOUNTER
No new care gaps identified.  [facility] Embedded Care Gaps. Reference number: [id number]. 11/09/2022   3:41:39 PM CST

## 2022-11-09 NOTE — TELEPHONE ENCOUNTER
Refill Routing Note   Medication(s) are not appropriate for processing by [facility] for the following reason(s):      - Required vitals are abnormal    [facility] action(s):  Defer       Medication Therapy Plan: Last BP abnormal: 10/27/22 (!) 132/98; Deferring to PCP and NP, appears NP has a therapy plan in place for blood pressure regulation  Medication reconciliation completed: No     Appointments  past 12m or future 3m with PCP    Date Provider   Last Visit   10/17/2022 JAKE Merrill MD   Next Visit   Visit date not found JAKE Merrill MD   ED visits in past 90 days: 0        Note composed:3:56 PM 11/09/2022

## 2022-11-09 NOTE — TELEPHONE ENCOUNTER
----- Message from Sheridan Matt sent at 11/9/2022  2:45 PM CST -----  Type:  RX Refill Request    Who Called: pt  Refill or New Rx:refill  RX Name and Strength:valsartan, chlorthalidone, amlodipine and insulin lispro 100 unit/mL pen 9 units, 3XDay  How is the patient currently taking it? (ex. 1XDay):1XDay  Is this a 30 day or 90 day RX:30 days  Preferred Pharmacy with phone number:.  [facility] [address] [place] [address] [address] [facility] [address]  [address] [facility] [address]  Phone: [phone] Fax: [phone]  Local or Mail Order:local  Ordering Provider:Dr Merrill   Would the patient rather a call back or a response via [facility]? Call  Best Call Back Number:[phone]  Additional Information: .    Thank you

## 2022-11-09 NOTE — TELEPHONE ENCOUNTER
----- Message from Sheridan Matt sent at 11/9/2022  2:45 PM CST -----  Type:  RX Refill Request    Who Called: pt  Refill or New Rx:refill  RX Name and Strength:valsartan, chlorthalidone, amlodipine and insulin lispro 100 unit/mL pen 9 units, 3XDay  How is the patient currently taking it? (ex. 1XDay):1XDay  Is this a 30 day or 90 day RX:30 days  Preferred Pharmacy with phone number:.  [facility] [address] [facility] [address] [facility]  [address] [facility] [address]  Phone: [phone] Fax: [phone]  Local or Mail Order:local  Ordering Provider:Dr Merrill   Would the patient rather a call back or a response via [facility]? Call  Best Call Back Number:[phone]  Additional Information: .    Thank you

## 2022-11-10 ENCOUNTER — TELEPHONE (OUTPATIENT)
Dept: DIABETES | Facility: CLINIC | Age: 67
End: 2022-11-10
Payer: MEDICARE

## 2022-11-10 ENCOUNTER — OFFICE VISIT (OUTPATIENT)
Dept: PRIMARY CARE CLINIC | Facility: CLINIC | Age: 67
End: 2022-11-10
Payer: MEDICARE

## 2022-11-10 VITALS
DIASTOLIC BLOOD PRESSURE: 80 MMHG | HEART RATE: 73 BPM | BODY MASS INDEX: 34.76 KG/M2 | RESPIRATION RATE: 18 BRPM | WEIGHT: 215.38 LBS | SYSTOLIC BLOOD PRESSURE: 130 MMHG | OXYGEN SATURATION: 98 %

## 2022-11-10 DIAGNOSIS — Z79.4 TYPE 2 DIABETES MELLITUS WITH HYPERGLYCEMIA, WITH LONG-TERM CURRENT USE OF INSULIN: ICD-10-CM

## 2022-11-10 DIAGNOSIS — E66.01 CLASS 2 SEVERE OBESITY DUE TO EXCESS CALORIES WITH SERIOUS COMORBIDITY AND BODY MASS INDEX (BMI) OF 37.0 TO 37.9 IN ADULT: Chronic | ICD-10-CM

## 2022-11-10 DIAGNOSIS — I10 ESSENTIAL HYPERTENSION: Primary | Chronic | ICD-10-CM

## 2022-11-10 DIAGNOSIS — E11.65 TYPE 2 DIABETES MELLITUS WITH HYPERGLYCEMIA, WITH LONG-TERM CURRENT USE OF INSULIN: ICD-10-CM

## 2022-11-10 DIAGNOSIS — I10 UNCONTROLLED HYPERTENSION: ICD-10-CM

## 2022-11-10 PROCEDURE — 3075F PR MOST RECENT SYSTOLIC BLOOD PRESS GE 130-139MM HG: ICD-10-PCS | Mod: CPTII,S$GLB,, | Performed by: PHYSICIAN ASSISTANT

## 2022-11-10 PROCEDURE — 99999 PR PBB SHADOW E&M-EST. PATIENT-LVL V: ICD-10-PCS | Mod: PBBFAC,,, | Performed by: PHYSICIAN ASSISTANT

## 2022-11-10 PROCEDURE — 1101F PT FALLS ASSESS-DOCD LE1/YR: CPT | Mod: CPTII,S$GLB,, | Performed by: PHYSICIAN ASSISTANT

## 2022-11-10 PROCEDURE — 3288F FALL RISK ASSESSMENT DOCD: CPT | Mod: CPTII,S$GLB,, | Performed by: PHYSICIAN ASSISTANT

## 2022-11-10 PROCEDURE — 3061F PR NEG MICROALBUMINURIA RESULT DOCUMENTED/REVIEW: ICD-10-PCS | Mod: CPTII,S$GLB,, | Performed by: PHYSICIAN ASSISTANT

## 2022-11-10 PROCEDURE — 3072F PR LOW RISK FOR RETINOPATHY: ICD-10-PCS | Mod: CPTII,S$GLB,, | Performed by: PHYSICIAN ASSISTANT

## 2022-11-10 PROCEDURE — 3044F HG A1C LEVEL LT 7.0%: CPT | Mod: CPTII,S$GLB,, | Performed by: PHYSICIAN ASSISTANT

## 2022-11-10 PROCEDURE — 3044F PR MOST RECENT HEMOGLOBIN A1C LEVEL <7.0%: ICD-10-PCS | Mod: CPTII,S$GLB,, | Performed by: PHYSICIAN ASSISTANT

## 2022-11-10 PROCEDURE — 3008F PR BODY MASS INDEX (BMI) DOCUMENTED: ICD-10-PCS | Mod: CPTII,S$GLB,, | Performed by: PHYSICIAN ASSISTANT

## 2022-11-10 PROCEDURE — 3061F NEG MICROALBUMINURIA REV: CPT | Mod: CPTII,S$GLB,, | Performed by: PHYSICIAN ASSISTANT

## 2022-11-10 PROCEDURE — 4010F ACE/ARB THERAPY RXD/TAKEN: CPT | Mod: CPTII,S$GLB,, | Performed by: PHYSICIAN ASSISTANT

## 2022-11-10 PROCEDURE — 3066F PR DOCUMENTATION OF TREATMENT FOR NEPHROPATHY: ICD-10-PCS | Mod: CPTII,S$GLB,, | Performed by: PHYSICIAN ASSISTANT

## 2022-11-10 PROCEDURE — 1126F PR PAIN SEVERITY QUANTIFIED, NO PAIN PRESENT: ICD-10-PCS | Mod: CPTII,S$GLB,, | Performed by: PHYSICIAN ASSISTANT

## 2022-11-10 PROCEDURE — 3079F PR MOST RECENT DIASTOLIC BLOOD PRESSURE 80-89 MM HG: ICD-10-PCS | Mod: CPTII,S$GLB,, | Performed by: PHYSICIAN ASSISTANT

## 2022-11-10 PROCEDURE — 3066F NEPHROPATHY DOC TX: CPT | Mod: CPTII,S$GLB,, | Performed by: PHYSICIAN ASSISTANT

## 2022-11-10 PROCEDURE — 3288F PR FALLS RISK ASSESSMENT DOCUMENTED: ICD-10-PCS | Mod: CPTII,S$GLB,, | Performed by: PHYSICIAN ASSISTANT

## 2022-11-10 PROCEDURE — 99214 PR OFFICE/OUTPT VISIT, EST, LEVL IV, 30-39 MIN: ICD-10-PCS | Mod: S$GLB,,, | Performed by: PHYSICIAN ASSISTANT

## 2022-11-10 PROCEDURE — 1126F AMNT PAIN NOTED NONE PRSNT: CPT | Mod: CPTII,S$GLB,, | Performed by: PHYSICIAN ASSISTANT

## 2022-11-10 PROCEDURE — 3072F LOW RISK FOR RETINOPATHY: CPT | Mod: CPTII,S$GLB,, | Performed by: PHYSICIAN ASSISTANT

## 2022-11-10 PROCEDURE — 3008F BODY MASS INDEX DOCD: CPT | Mod: CPTII,S$GLB,, | Performed by: PHYSICIAN ASSISTANT

## 2022-11-10 PROCEDURE — 1101F PR PT FALLS ASSESS DOC 0-1 FALLS W/OUT INJ PAST YR: ICD-10-PCS | Mod: CPTII,S$GLB,, | Performed by: PHYSICIAN ASSISTANT

## 2022-11-10 PROCEDURE — 4010F PR ACE/ARB THEARPY RXD/TAKEN: ICD-10-PCS | Mod: CPTII,S$GLB,, | Performed by: PHYSICIAN ASSISTANT

## 2022-11-10 PROCEDURE — 99999 PR PBB SHADOW E&M-EST. PATIENT-LVL V: CPT | Mod: PBBFAC,,, | Performed by: PHYSICIAN ASSISTANT

## 2022-11-10 PROCEDURE — 3079F DIAST BP 80-89 MM HG: CPT | Mod: CPTII,S$GLB,, | Performed by: PHYSICIAN ASSISTANT

## 2022-11-10 PROCEDURE — 99214 OFFICE O/P EST MOD 30 MIN: CPT | Mod: S$GLB,,, | Performed by: PHYSICIAN ASSISTANT

## 2022-11-10 PROCEDURE — 3075F SYST BP GE 130 - 139MM HG: CPT | Mod: CPTII,S$GLB,, | Performed by: PHYSICIAN ASSISTANT

## 2022-11-10 NOTE — PROGRESS NOTES
"Subjective:       Patient ID: Lacey Calderón is a 67 y.o. female.    HPI    Lifestyle related medical issues:     DM II  OA  HLD  Hypertension       Past Medical History:   Diagnosis Date    Arthritis     Cardiac arrhythmia 11/13/2020    Cataract     Class 2 severe obesity due to excess calories with serious comorbidity and body mass index (BMI) of 37.0 to 37.9 in adult 10/19/2020    Essential hypertension 10/19/2020    Hypertension     Paroxysmal atrial fibrillation 11/13/2020    Primary localized osteoarthritis of knees, bilateral 10/19/2020    Pure hypercholesterolemia 10/24/2020    Pure hypercholesterolemia 10/24/2020    Type 2 diabetes mellitus with hyperglycemia, with long-term current use of insulin 6/3/2021     Social Determinants of Health with Concerns     Alcohol Use: Not on file   Financial Resource Strain: Not on file   Food Insecurity: Not on file   Transportation Needs: Not on file   Physical Activity: Not on file   Stress: Not on file   Social Connections: Not on file   Housing Stability: Not on file     History reviewed. No pertinent surgical history.  Family History   Problem Relation Age of Onset    Arthritis Mother     Heart disease Mother     Miscarriages / Stillbirths Mother     Dementia Mother     Glaucoma Mother     Cataracts Mother     No Known Problems Father          Physical activity:  walking some times. But has knee pain   Diet: poor.   Eats processed foods and sugars   Some sodas   Some friend foods         Wt Readings from Last 3 Encounters:   11/10/22 97.7 kg (215 lb 6.2 oz)   10/20/22 101.6 kg (224 lb)   10/17/22 104.6 kg (230 lb 9.6 oz)       Current stage of change precontemplation   Next stage of change contemplation       Current Outpatient Medications   Medication Instructions    amLODIPine (NORVASC) 10 mg, Oral, Daily    apixaban (ELIQUIS) 5 mg, Oral, 2 times daily    BD JANE 2ND GEN PEN NEEDLE 32 gauge x 5/32" Ndle Use as directed to inject insulin as prescribed by [facility] " provider.    blood sugar diagnostic Strp Check blood glucose 4 times daily (BEFORE breakfast or other meal) and as needed for symptoms of low or high blood glucose    blood-glucose meter kit Check blood glucose 4 times daily (BEFORE breakfast or other meal) and as needed for symptoms of low or high blood glucose    carvediloL (COREG) 6.25 mg, Oral, 2 times daily    chlorthalidone (HYGROTEN) 25 mg, Oral, Daily    DIPH,PERTUSS,ACEL,TET-VAC,(ADACEL,TDAP ADOLESN/ADULT,PF)2 Lf-(2.5-5-3-5 mcg)-5Lf/0.5 mL Syrg No dose, route, or frequency recorded.    dorzolamide-timolol 2-0.5% (COSOPT) 22.3-6.8 mg/mL ophthalmic solution 1 drop, Both Eyes, Every 12 hours    empty container (SHARPS CONTAINER) Misc Use to dispose sharps    insulin lispro 9 Units, Subcutaneous, 3 times daily before meals    lancets Misc Check blood glucose 4 times daily (BEFORE breakfast or other meal) and as needed for symptoms of low or high blood glucose    LANTUS SOLOSTAR U-100 INSULIN 30 Units, Subcutaneous, Nightly    latanoprost 0.005 % ophthalmic solution Both Eyes    metFORMIN (GLUCOPHAGE-XR) 500 mg, Oral, Daily    PNEUMOVAX-23 25 mcg/0.5 mL vaccine No dose, route, or frequency recorded.    potassium chloride SA (K-DUR,KLOR-CON M) 10 MEQ tablet 10 mEq, Oral, Daily    rosuvastatin (CRESTOR) 20 mg, Oral, Nightly    traMADoL (ULTRAM) 50 mg, Oral, Every 6 hours PRN    valsartan (DIOVAN) 320 mg, Oral, Daily    vitamin D (VITAMIN D3) 1,000 Units, Oral, Daily            Review of Systems   Constitutional:  Negative for fatigue, fever and unexpected weight change.   HENT:  Negative for sore throat and trouble swallowing.    Respiratory:  Negative for cough and shortness of breath.    Cardiovascular:  Negative for chest pain.   Gastrointestinal:  Negative for abdominal pain.   Skin:  Negative for color change, pallor, rash and wound.   Hematological:  Negative for adenopathy. Does not bruise/bleed easily.   All other systems reviewed and are negative.     Objective:   /80   Pulse 73   Resp 18   Wt 97.7 kg (215 lb 6.2 oz)   SpO2 98%   BMI 34.76 kg/m²      Physical Exam  Constitutional:       Appearance: Normal appearance. She is obese.   HENT:      Head: Normocephalic and atraumatic.   Cardiovascular:      Pulses: Normal pulses.   Musculoskeletal:      Comments: Using cane to ambulate    Neurological:      General: No focal deficit present.      Mental Status: She is alert and oriented to person, place, and time.         Lab Results   Component Value Date    CHOL 169 08/18/2022    TRIG 77 08/18/2022    HDL 60 08/18/2022    ALT 14 08/18/2022    AST 16 08/18/2022     09/29/2022    K 4.4 09/29/2022     09/29/2022    CREATININE 0.8 09/29/2022    BUN 17 09/29/2022    CO2 26 09/29/2022    TSH 2.253 10/19/2020    HGBA1C 5.9 (H) 08/18/2022       Assessment:       1. Essential hypertension    2. Class 2 severe obesity due to excess calories with serious comorbidity and body mass index (BMI) of 37.0 to 37.9 in adult    3. Type 2 diabetes mellitus with hyperglycemia, with long-term current use of insulin    4. Uncontrolled hypertension          Plan:   Essential hypertension    Class 2 severe obesity due to excess calories with serious comorbidity and body mass index (BMI) of 37.0 to 37.9 in adult  -     Ambulatory referral/consult to [facility]    Type 2 diabetes mellitus with hyperglycemia, with long-term current use of insulin  -     Ambulatory referral/consult to Diabetes Education; Future; Expected date: 11/17/2022    Uncontrolled hypertension        Patient on a good bit of insulin   Would like her to see CDEs   It is my view if she cleared some processed carbs/sugar from her diet then she likely can get insulin reduced      Physical Activity  - increase if possible. Falls risks with knee pain    Diet  - focus on veggies, whole grains, fresh fruits, lean meats       Orders Placed This Encounter   Procedures    Ambulatory  referral/consult to Diabetes Education

## 2022-11-10 NOTE — PATIENT INSTRUCTIONS
Low-Salt Diet  This diet removes foods that are high in salt. It also limits the amount of salt you use when cooking. It is most often used for people with high blood pressure, edema (fluid retention), and kidney, liver, or heart disease.  Table salt contains the mineral sodium. Your body needs sodium to work normally. But too much sodium can make your health problems worse. Your healthcare provider is recommending a low-salt (also called low-sodium) diet for you. Your total daily allowance of salt is 1,500 to 2,300 milligrams (mg). It is less than 1 teaspoon of table salt. This means you can have only about 500 to 700 mg of sodium at each meal. People with certain health problems should limit salt intake to the lower end of the recommended range.    When you cook, dont add much salt. If you can cook without using salt, even better. Dont add salt to your food at the table.  When shopping, read food labels. Salt is often called sodium on the label. Choose foods that are salt-free, low salt, or very low salt. Note that foods with reduced salt may not lower your salt intake enough.    Beans, potatoes, and pasta  Ok: Dry beans, split peas, lentils, potatoes, rice, macaroni, pasta, spaghetti without added salt  Avoid: Potato chips, tortilla chips, and similar products  Breads and cereals  Ok: Low-sodium breads, rolls, cereals, and cakes; low-salt crackers, matzo crackers  Avoid: Salted crackers, pretzels, popcorn, Georgian toast, pancakes, muffins  Dairy  Ok: Milk, chocolate milk, hot chocolate mix, low-salt cheeses, and yogurt  Avoid: Processed cheese and cheese spreads; Roquefort, Camembert, and cottage cheese; buttermilk, instant breakfast drink  Desserts  Ok: Ice cream, frozen yogurt, juice bars, gelatin, cookies and pies, sugar, honey, jelly, hard candy  Avoid: Most pies, cakes and cookies prepared or processed with salt; instant pudding  Drinks  Ok: Tea, coffee, fizzy (carbonated) drinks, juices  Avoid: Flavored  coffees, electrolyte replacement drinks, sports drinks  Meats  Ok: All fresh meat, fish, poultry, low-salt tuna, eggs, egg substitute  Avoid: Smoked, pickled, brine-cured, or salted meats and fish. This includes sánchez, chipped beef, corned beef, hot dogs, deli meats, ham, kosher meats, salt pork, sausage, canned tuna, salted codfish, smoked salmon, herring, sardines, or anchovies.  Seasonings and spices  Ok: Most seasonings are okay. Good substitutes for salt include: fresh herb blends, hot sauce, lemon, garlic, magana, vinegar, dry mustard, parsley, cilantro, horseradish, tomato paste, regular margarine, mayonnaise, unsalted butter, cream cheese, vegetable oil, cream, low-salt salad dressing and gravy.  Avoid: Regular ketchup, relishes, pickles, soy sauce, teriyaki sauce, Worcestershire sauce, BBQ sauce, tartar sauce, meat tenderizer, chili sauce, regular gravy, regular salad dressing, salted butter  Soups  Ok: Low-salt soups and broths made with allowed foods  Avoid: Bouillon cubes, soups with smoked or salted meats, regular soup and broth  Vegetables  Ok: Most vegetables are okay; also low-salt tomato and vegetable juices  Avoid: Sauerkraut and other brine-soaked vegetables; pickles and other pickled vegetables; tomato juice, olives  Date Last Reviewed: 8/1/2016 © 2000-2017 [employer]. [address] [place] [id number]. All rights reserved. This information is not intended as a substitute for professional medical care. Always follow your healthcare professional's instructions.           PRODUCE  [] All fresh fruit   [] All fresh vegetables   [] All fresh herbs  [] All herb purees + pastes  [] Pre-spiralized vegetable noodles   [] Steam-In-The-Bag begetables  [] Riced cauliflower  [] Jicama sticks  [] Love Beets  all varieties  [] Wholly Guacamole  all varieties  [] Hummus  all varieties, chickpea + vegetable  [] Tofu Shirataki noodles    [] Tofu  all varieties  [] Lovely  all  "varieties    PROTEIN  CHICKEN   [] Boneless, skinless breasts  [] Boneless, skinless thighs  [] Ground chicken breast, at least 93% lean  [] Chicken breast cutlet  [] Aidell's  Chicken Sausage + Chicken Meatballs    TURKEY   [] Turkey breast tenderloin   [] Ground turkey breast, at least 93% lean  [] Vaishnavi Naturals  Turkey Sausage    BEEF  [] Tenderloin  [] Sirloin  [] Top Loin  [] Flank Steak  [] Round Steak  [] Filet  [] Lean ground beef, at least 93% lean + grass-fed preferable    PORK  [] Tenderloin  [] Pork Chop  [] Center Cut  [] Tyrone Naturals  No-Sugar [place]    BISON  [] [place]  90 - 95% lean    SEAFOOD  [] All fresh fish + seafood; locally sourced when possible  [] Smoked salmon    HEAT + EAT ENTREES   [] Trell's Natural Foods  Chicken, Pork, Beef  [] Eze  "All Natural" Grilled Chicken Breast + Strips, all varieties    SAUCES SPREADS + DIPS  [] Bitchin Sauce  Original, Chipotle, Cilantro [place]  [] Namita's Kitchen  Tzatziki Yogurt Dip, Babaganoush, Hummus  [] Wholly Guacamole  all varieties  [] Hummus  all varieties  [] Alva Gringo Salsa  all varieties  [] Mrs. Tulio's Salsa  all varieties  [] Stubb's All Natural BBQ Sauce  [] Primal Kitchen  Taylor, Ketchup, BBQ Sauce  [] Primal Kitchen Pasta Sauce  Roasted Garlic, Tomato Basil, no-dairy Vodka Sauce  [] Sal & Clair's  HeartSmart Pasta Sauce    DAIRY/DAIRY SUBSTITUTES/EGGS  EGGS   [] All eggs  cage-free, pasture-raise preferable  [] Crepini  egg wraps  [] Vital Farms  Pasture-Raised Egg Bites  [] JUST Egg [vegan]     CREAMERS   [] Califia  Better Half, original + vanilla unsweetened  [] NutPods  all varieties    MILK   [] Horizon Organic  all varieties except chocolate  [] Organic Valley  all varieties except chocolate  [] Organic Valley  ultra-filtered, reduced fat milk     PLANT_BASED MILK ALTERNATIVES  [] All unsweetened almond milks  original, vanilla + chocolate  [] Ripple  unsweetened   [] Milkadamia  original " +_ vanilla, unsweetened   [] Forager  original + vanilla, unsweetened   [] Silk Organic  soy milk, unsweetened  [] Oatly  unsweetened  [] Califia  regular + protein-fortified oat milk, unsweetened     CHEESES  [] Regular or reduced fat cheeses  [] BelGioso  Fresh Mozzarella Snack Packs, Parmesan Power-full Snack   [] Goat cheese  [] Fresh mozzarella  [] String cheese  all varieties  [] Yamini Cottage Cheese  [] Radha's Cultured Cottage Cheese  [] Reny Life 'Just Like Mozzarella'  plant-based shreds and other varieties  [] Parmela Creamery  plant-based shredded cheese    YOGURT  [] Fage  2% low-fat, plain  [] Siggi's  plain, vanilla  [] Chobani Greek  nonfat + whole milk yogurt, plain   [] Chobani Less Sugar  all flavored varieties   [] Oikos Greek  nonfat, plain  [] Two Good  all varieties   [] [facility] [place] [facility] Provisions  plain  [] [place]aby Organic  low-fat + nonfat, plain  [] [facility] Farm  goat milk yogurt, plain  [] Kefit  unsweetened, plain  [] Forager  Greek style unsweetened, plain [dairy-free]  [] [place]  unsweetened Greek style, plain [dairy-free]  [] [facility]  almond milk yogurt, vanilla or plain, unsweetened [dairy-free]    FREEZER SECTION  FROZEN VEGGIES  [] All plain frozen veggies + greens [e.g. broccoli, brussels, carrots, okra, mushrooms, zucchini, yellow squash, butternut squash, kale, spinach, kristen greens]  [] Riced veggies [e.g. cauliflower, broccoli, butternut squash]  [] Edamame  all varieties  [] Green Giant  [] Veggie Spirals  [] Marinated Veggies [e.g. eggplant, peppers, zucchini]  [] Simply Steam Fulshear Sprouts  [] Birds Eye  [] Power Blend Italian Style  lentils, broccoli, kale, zucchini  []  Fulshear Sprouts & Carrots  [] Oven Roasters Broccoli & Cauliflower  [] [place] Blend  [] Tattooed   [] Green Bean Blend  [] Farmer's Market Ratatouille  [] Butter Balsamic Glazed Vegetables  [] Riced Cauliflower & Quinoa Mediterranean Style  [] Pat's Good  Life  Southern Style Greens [sauteed kale + onion]    FROZEN FRUITS  [] All unsweetened frozen fruits  all varieties  [] Dole Fruits & Veggie Blends  Berries 'n Kale  [] Dole Mix-ins  Triple Berry     FROZEN ENTREES  [] The Good Kitchen meals  all varieties [ e.g. Chili Lime Chicken Over Riced Cauliflower]  [] Premium Paleo  Not Earl Momma's Meatloaf  [] Primal Kitchen  Chicken Pesto + Steak Fajitas w/ Peppers & Onions  [] Eating Well Frozen Entrees  Butter Chicken Masala, Steak Carne Asada, Creamy Pesto Chicken, Chicken + Wild Rice Stroganoff, Yellow Fraga Chicken, Sun-dried Tomato Chicken, Chicken Lo Mein  [] Realgood Entree Bowls  Occitan Inspired Beef Bowl over Riced Cauliflower, Chicken Burrito Bowl   [] Great Karma Coconut Fraga  [] Daysi's  Tamale Margarita with Black Beans, Vegetable Lasagna  [] Fipeohi Mayan [place] Bake  [] Healthy Choice  Simply Steamers Chicken Fried Rice  [] Basil Pesto Chicken & Mauritian Style Pork Power Bowls  [] Tattooed   Enchilada Bowl  [] Danielito Farms  Spicy Black Bean Burgers    FROZEN PIZZAS  [] Cauli'flour Foods  Cauliflower Pizza Crusts  [] Outer Aisle  Cauliflower Crust  [] Daysi's  Veggie Crust Cheese Pizza  [] Quest Pizza     VEGETARIAN PRODUCTS  [] Beyond Meat  ground 'meat' + grilled 'chicken' strips  [] Tofurkey  Original Italian Sausage + Original Tempeh  [] Gardein  Beefless Ground + Meatless Meatballs  [] Danielito Farms Grillers  Original Burger, Crumbles, Meatballs    ICE CREAMS + FROZEN DESSERTS  [] Halo Top  regular + keto series, pops  [] Rebel  ice cream + dessert sandwiches  [] Enlightened  ice cream + bars  [] Nightfood  ice cream  [] Realgood  ice cream  [] Arctic Zero Fit  frozen pint  [] The Frozen Farmer  sorbets  [] Wholly Rollies  Protein Balls, all varieties  [] Dream Pops  Coconut Latte    FROZEN BREAKFASTS  [] Realgood  Breakfast Sandwiches on Cauliflower Cheesy Bread  [] Rebel  ice cream + dessert sandwiches  []  Enlightened  ice cream + bars  [] Nightfood  ice cream  [] Realgood  ice cream  [] Arctic Zero Fit  frozen pint  [] The Frozen Farmer  sorbets  [] Wholly Rollies  Protein Balls, all varieties  [] Dream Pops  Coconut Latte    BREADS/BUNS/WRAPS  [] Gold Bread: All Types - In Freezer Section   [] Flat Out Light Wraps - All Varieties   [] Flat Out Protein Up Carb Down Flat Bread   [] Kontos Whole Wheat Pocket [place]   [] Marvel HOME 100% Whole Wheat Tortillas   [] LaTortilla Factory Tortillas - Smart & Delicious; 50 or 80-calorie   [] Nature's Own 100% Whole Wheat Bread   [] Orowheat Healthful - 100% Whole Wheat Slice Bread and [place] Thins   [] Orowheat Healthful - Whole Wheat Nuts & Grain Bread; Flax & Seed Bread   [] Pepperidge Farm Natural Whole Grain 15 Bread   [] Pepperidge Farm Natural Whole Grain English Muffin - 100% Whole Wheat   [] PepperAxial Biotech Farm Very Thin 100% Whole Wheat   [] Yelitza Raymundo 45 Calories and Delightful   [] Franky' 100% Whole Wheat Thin-Sliced Bagels and English Muffins   [] Western Bagel: Perfect 10     GLUTEN FREE  [] Leif's Gluten Free Bread   [] Kerr Bakehouse 7-Grain Gluten Free Bread     LEGUME PASTA   [] Explore Asian Organic Black Bean Spaghetti   [] Modern Table   [] Tolerant Foods       NUT BUTTERS & JELLIES    NUT BUTTERS   [] Better'n Chocolate: Coconut Chocolate Peanut Butter Spread   [] Better'n Peanut Butter - All Types   [] Earth Balance Coconut and Peanut Spread   [] Zackery's Nut [place]   [] MaraNatha: All Natural Roasted Cashew Butter - [place] or Creamy   [] MaraNatha: Roasted Peanut Butter   [] Nuts 'N More Peanut [place] - All Flavors   [] PB2 Powder - Original or Chocolate   [] Skippy Natural - Creamy, Super Chunk   [] Smart Balance Peanut Butter - [place] or Creamy   [] Peanut Butter & Company:   [] Smooth , Crunch Time, The Heat Is On, Old Fashioned Smooth, Mighty Nut- Powdered Peanut Butter, Squeeze Pack   [] Eugenia's Natural Peanut Butter - Marianela or  Creamy   [] Sunbutter Nut Butter   [] Wild Friends Protein Peanut Butter/Roselle o Butter - Vanilla or Chocolate     JELLIES  o Polaner's All Fruit   o Clearly Organic Best Choice: Strawberry Fruit Spread       SNACKS    BARS  [] Kashi Bars - Chewy or Crunchy; Honey [place] o Flax or Peanut Butter   [] KIND Bars - 5 Grams of Sugar or Less   [] KIND Protein Bars - Strong and KIND   [] [facility] Protein Bar - All Varieties   [] Nature Valley Roasted Nut Crunch - Roselle Crunch; Peanut Crunch   [] [facility] Simple Nut Bar - Roasted Peanut & Honey   [] [facility] Simple Nut Bar - [place], Cashew & Sea Salt   [] [facility] Nut [facility] Bar - Salted Caramel Peanut   [] Think Thin Protein Bars   [] Quest Bars, Power Crunch Bars, Pure Protein Bars     BEEF JERKY - NITRATE FREE   [] Game On   [] Grass Luxury Fashion Trade Farms   [] Krave   [] Ostrim   [] Perky Jerky   [] Primal Strips Meatless Vegan Jerky   [] [place]     CHIPS   [] Beanitos Chips   [] Fruit Crisps - e.g. Brother's-All-Natural, Bare Fruit, Yoga Chips   [] Merly's Soy Crisps: 1.3 ounce bag   [] Quest Protein Chips   [] Wasa Whole Wheat Crisp Bread     CRACKERS  [] Nathalia's Gone Crackers   [] Nabisco Triscuit: Regular and Thin Crisp Crackers   [] Vans Say Cheese Crackers (G-F)     POPCORN/NUTS   [] Elbert Blackwood's Smart Pop Popcorn - Single Serving   [] 100-Calorie Pack of Nuts - All Varieties     PROTEIN POWDERS & DRINKS  []  Protein -  Whey Protein Powder   [] Garden of Life Raw Protein Powder   [] Iconic Ready-To-Drink Protein Drink   [] Villalobos One Protein Powder   [] VegaSport Protein Powder     SALSA/HUMMUS/DIPS   [] Eat Well Embrace Life: Zesty [place] Carrot o Hummus   [] Pre-Portioned Guacamole Packs   [] Jennifer's   [] Tostitos Restaurant Style Salsa       SOUPS   [] Daysi's Organic Soups - Lentil, Vegetable, Split Pea, Low-Sodium     CANNED GOODS   [] 100% Pure Pumpkin   [] BlueRunner Creole Cream-Style Red Beans or Navy Beans   [] Cajun  Power Chicken Gumbo Base   [] Chicken of the Sea [place]   [] Callie Fresh Cut Sliced Beets   [] Hormel Breast of Chicken in Water   [] LeSuer Tender Baby Whole Carrots   [] Darling Tabasco [place] Starter   [] StarKist: Chunk Lite Tuna in Water, Gourmet Select Pouches   [] StarKist: Yellowfin Tuna Fillets   [] Trappey's: Kidney, Butter, Webb, Black Eye, Field, and Black Beans   [] JONATHAN Alejandra's Turnip Greens or Reymundo Spinach     CONDIMENTS/ SAUCES/SPREADS/ SPICES  [] Quirino Pascual's Magic Seasonings - Regular or Salt Free   [] Annette Donahue's Sauces - All Flavors   [] Laughing Cow Light - All Flavors   [] Dash Salt-Free Marinade - All Flavors   [] Jerel & Clair's: Heart Smart Pasta Sauces   [] Tabasco     SALAD DRESSINGS  [] Chelsea's Naturals: Lite Honey Mustard   [] Marcellus's Own: Lighten Up Salad Dressing - All Varieties   [] OPA Greek Yogurt Dressings - Ranch, Blue o Cheese, Caesar, Feta Dill     SWEETENERS  [] Sweet Hagan Sweetener   [] Swerve   [] Truvia     BEVERAGES  [] Coconut Water   [] Crystal Light PURE - All Flavors   [] Honest Tea: Just Green Tea, Unsweetened   [] Kombucha Tea   [] [place]   [] [facility] Bloody Nathalia Mix   [] Metromint - Zero-Sugar; All Natural Flavored   [] Enrique - Plain or Flavored   [] Spindriff [place]   [] Steaz - Zero-Sugar, All-Natural, Sparkling Tea   [] Tea Bags: Any Brand - e.g. Shant, Yogi, Tazzo, Celestial   [] V8 100% Vegetable Juice   [] Vitamin Water Zero   [] Water   [] Zevia - Stevia Sweetened Soft Drink     BEER/ROSELINE/LIQUORS  []Raines's Premier Light 64 Calories   [] Bud Select - 55 Calories   [] [facility] Bloody Nathalia Mix   [] Glez Genuine Draft - 64 Calories   [] Red or White Wine - All Varieties     CEREALS: HOT/COLD   [] Lacey's Puffin's Original Cereal  [] Yesi's Mill Oat Bran Hot Cereal - Cracked Wheat, Multi-Grain  [] Kashi GoLean Cereal  [] Kashi GoLean Hot Cereal packets - Vanilla; Honey Cinnamon  [] Angela's Special K Protein  Cereal  [] Nalini's Steel Cut Edwige Oatmeal  [] Nature's Path Smart Bran  [] Mormonism Instant Oatmeal packet, Original  [] Mormonism Old Fashioned Mormonism Oats  [] Uncle Yuri's Whole Wheat & Flaxseed Original Cereal

## 2022-11-14 RX ORDER — CHLORTHALIDONE 25 MG/1
25 TABLET ORAL DAILY
Qty: 30 TABLET | Refills: 0 | Status: SHIPPED | OUTPATIENT
Start: 2022-11-14 | End: 2022-12-30

## 2022-11-14 RX ORDER — AMLODIPINE BESYLATE 10 MG/1
10 TABLET ORAL DAILY
Qty: 30 TABLET | Refills: 0 | Status: SHIPPED | OUTPATIENT
Start: 2022-11-14 | End: 2022-12-19

## 2022-11-14 RX ORDER — VALSARTAN 320 MG/1
320 TABLET ORAL DAILY
Qty: 30 TABLET | Refills: 0 | Status: SHIPPED | OUTPATIENT
Start: 2022-11-14 | End: 2022-12-28

## 2022-11-14 NOTE — TELEPHONE ENCOUNTER
REFILL REQUEST APPROVED.  Requested Prescriptions   Pending Prescriptions Disp Refills    valsartan (DIOVAN) 320 MG tablet 30 tablet 0     Sig: Take 1 tablet (320 mg total) by mouth once daily.    chlorthalidone (HYGROTEN) 25 MG Tab 30 tablet 0     Sig: Take 1 tablet (25 mg total) by mouth once daily.    amLODIPine (NORVASC) 10 MG tablet 30 tablet 0     Sig: Take 1 tablet (10 mg total) by mouth once daily.   Signed Prescriptions Disp Refills    insulin lispro 100 unit/mL pen 45 mL 0     Sig: Inject 9 Units into the skin 3 (three) times daily before meals.     Authorizing Provider: JAKE PEARSON     Ordering User: MONDOR, LACEY J

## 2022-11-15 ENCOUNTER — OFFICE VISIT (OUTPATIENT)
Dept: SPORTS MEDICINE | Facility: CLINIC | Age: 67
End: 2022-11-15
Payer: MEDICARE

## 2022-11-15 DIAGNOSIS — R80.9 TYPE 2 DIABETES MELLITUS WITH MICROALBUMINURIA, WITH LONG-TERM CURRENT USE OF INSULIN: Chronic | ICD-10-CM

## 2022-11-15 DIAGNOSIS — Z79.01 CHRONIC ANTICOAGULATION: ICD-10-CM

## 2022-11-15 DIAGNOSIS — M17.0 PRIMARY LOCALIZED OSTEOARTHRITIS OF KNEES, BILATERAL: Primary | ICD-10-CM

## 2022-11-15 DIAGNOSIS — M25.561 BILATERAL CHRONIC KNEE PAIN: ICD-10-CM

## 2022-11-15 DIAGNOSIS — M25.562 BILATERAL CHRONIC KNEE PAIN: ICD-10-CM

## 2022-11-15 DIAGNOSIS — G89.29 BILATERAL CHRONIC KNEE PAIN: ICD-10-CM

## 2022-11-15 DIAGNOSIS — Z79.4 TYPE 2 DIABETES MELLITUS WITH MICROALBUMINURIA, WITH LONG-TERM CURRENT USE OF INSULIN: Chronic | ICD-10-CM

## 2022-11-15 DIAGNOSIS — E66.9 OBESITY (BMI 35.0-39.9 WITHOUT COMORBIDITY): ICD-10-CM

## 2022-11-15 DIAGNOSIS — E11.29 TYPE 2 DIABETES MELLITUS WITH MICROALBUMINURIA, WITH LONG-TERM CURRENT USE OF INSULIN: Chronic | ICD-10-CM

## 2022-11-15 DIAGNOSIS — I10 ESSENTIAL HYPERTENSION: Chronic | ICD-10-CM

## 2022-11-15 PROCEDURE — 3072F LOW RISK FOR RETINOPATHY: CPT | Mod: CPTII,S$GLB,, | Performed by: STUDENT IN AN ORGANIZED HEALTH CARE EDUCATION/TRAINING PROGRAM

## 2022-11-15 PROCEDURE — 1101F PT FALLS ASSESS-DOCD LE1/YR: CPT | Mod: CPTII,S$GLB,, | Performed by: STUDENT IN AN ORGANIZED HEALTH CARE EDUCATION/TRAINING PROGRAM

## 2022-11-15 PROCEDURE — 3288F PR FALLS RISK ASSESSMENT DOCUMENTED: ICD-10-PCS | Mod: CPTII,S$GLB,, | Performed by: STUDENT IN AN ORGANIZED HEALTH CARE EDUCATION/TRAINING PROGRAM

## 2022-11-15 PROCEDURE — 20610 DRAIN/INJ JOINT/BURSA W/O US: CPT | Mod: RT,S$GLB,, | Performed by: STUDENT IN AN ORGANIZED HEALTH CARE EDUCATION/TRAINING PROGRAM

## 2022-11-15 PROCEDURE — 99499 UNLISTED E&M SERVICE: CPT | Mod: S$GLB,,, | Performed by: STUDENT IN AN ORGANIZED HEALTH CARE EDUCATION/TRAINING PROGRAM

## 2022-11-15 PROCEDURE — 3066F PR DOCUMENTATION OF TREATMENT FOR NEPHROPATHY: ICD-10-PCS | Mod: CPTII,S$GLB,, | Performed by: STUDENT IN AN ORGANIZED HEALTH CARE EDUCATION/TRAINING PROGRAM

## 2022-11-15 PROCEDURE — 1125F PR PAIN SEVERITY QUANTIFIED, PAIN PRESENT: ICD-10-PCS | Mod: CPTII,S$GLB,, | Performed by: STUDENT IN AN ORGANIZED HEALTH CARE EDUCATION/TRAINING PROGRAM

## 2022-11-15 PROCEDURE — 3061F PR NEG MICROALBUMINURIA RESULT DOCUMENTED/REVIEW: ICD-10-PCS | Mod: CPTII,S$GLB,, | Performed by: STUDENT IN AN ORGANIZED HEALTH CARE EDUCATION/TRAINING PROGRAM

## 2022-11-15 PROCEDURE — 1159F MED LIST DOCD IN RCRD: CPT | Mod: CPTII,S$GLB,, | Performed by: STUDENT IN AN ORGANIZED HEALTH CARE EDUCATION/TRAINING PROGRAM

## 2022-11-15 PROCEDURE — 1159F PR MEDICATION LIST DOCUMENTED IN MEDICAL RECORD: ICD-10-PCS | Mod: CPTII,S$GLB,, | Performed by: STUDENT IN AN ORGANIZED HEALTH CARE EDUCATION/TRAINING PROGRAM

## 2022-11-15 PROCEDURE — 20610 LARGE JOINT ASPIRATION/INJECTION: R KNEE: ICD-10-PCS | Mod: RT,S$GLB,, | Performed by: STUDENT IN AN ORGANIZED HEALTH CARE EDUCATION/TRAINING PROGRAM

## 2022-11-15 PROCEDURE — 3066F NEPHROPATHY DOC TX: CPT | Mod: CPTII,S$GLB,, | Performed by: STUDENT IN AN ORGANIZED HEALTH CARE EDUCATION/TRAINING PROGRAM

## 2022-11-15 PROCEDURE — 1160F PR REVIEW ALL MEDS BY PRESCRIBER/CLIN PHARMACIST DOCUMENTED: ICD-10-PCS | Mod: CPTII,S$GLB,, | Performed by: STUDENT IN AN ORGANIZED HEALTH CARE EDUCATION/TRAINING PROGRAM

## 2022-11-15 PROCEDURE — 1101F PR PT FALLS ASSESS DOC 0-1 FALLS W/OUT INJ PAST YR: ICD-10-PCS | Mod: CPTII,S$GLB,, | Performed by: STUDENT IN AN ORGANIZED HEALTH CARE EDUCATION/TRAINING PROGRAM

## 2022-11-15 PROCEDURE — 3044F PR MOST RECENT HEMOGLOBIN A1C LEVEL <7.0%: ICD-10-PCS | Mod: CPTII,S$GLB,, | Performed by: STUDENT IN AN ORGANIZED HEALTH CARE EDUCATION/TRAINING PROGRAM

## 2022-11-15 PROCEDURE — 99999 PR PBB SHADOW E&M-EST. PATIENT-LVL IV: ICD-10-PCS | Mod: PBBFAC,,, | Performed by: STUDENT IN AN ORGANIZED HEALTH CARE EDUCATION/TRAINING PROGRAM

## 2022-11-15 PROCEDURE — 4010F ACE/ARB THERAPY RXD/TAKEN: CPT | Mod: CPTII,S$GLB,, | Performed by: STUDENT IN AN ORGANIZED HEALTH CARE EDUCATION/TRAINING PROGRAM

## 2022-11-15 PROCEDURE — 4010F PR ACE/ARB THEARPY RXD/TAKEN: ICD-10-PCS | Mod: CPTII,S$GLB,, | Performed by: STUDENT IN AN ORGANIZED HEALTH CARE EDUCATION/TRAINING PROGRAM

## 2022-11-15 PROCEDURE — 3288F FALL RISK ASSESSMENT DOCD: CPT | Mod: CPTII,S$GLB,, | Performed by: STUDENT IN AN ORGANIZED HEALTH CARE EDUCATION/TRAINING PROGRAM

## 2022-11-15 PROCEDURE — 99499 NO LOS: ICD-10-PCS | Mod: S$GLB,,, | Performed by: STUDENT IN AN ORGANIZED HEALTH CARE EDUCATION/TRAINING PROGRAM

## 2022-11-15 PROCEDURE — 1125F AMNT PAIN NOTED PAIN PRSNT: CPT | Mod: CPTII,S$GLB,, | Performed by: STUDENT IN AN ORGANIZED HEALTH CARE EDUCATION/TRAINING PROGRAM

## 2022-11-15 PROCEDURE — 99999 PR PBB SHADOW E&M-EST. PATIENT-LVL IV: CPT | Mod: PBBFAC,,, | Performed by: STUDENT IN AN ORGANIZED HEALTH CARE EDUCATION/TRAINING PROGRAM

## 2022-11-15 PROCEDURE — 3061F NEG MICROALBUMINURIA REV: CPT | Mod: CPTII,S$GLB,, | Performed by: STUDENT IN AN ORGANIZED HEALTH CARE EDUCATION/TRAINING PROGRAM

## 2022-11-15 PROCEDURE — 3072F PR LOW RISK FOR RETINOPATHY: ICD-10-PCS | Mod: CPTII,S$GLB,, | Performed by: STUDENT IN AN ORGANIZED HEALTH CARE EDUCATION/TRAINING PROGRAM

## 2022-11-15 PROCEDURE — 3044F HG A1C LEVEL LT 7.0%: CPT | Mod: CPTII,S$GLB,, | Performed by: STUDENT IN AN ORGANIZED HEALTH CARE EDUCATION/TRAINING PROGRAM

## 2022-11-15 PROCEDURE — 1160F RVW MEDS BY RX/DR IN RCRD: CPT | Mod: CPTII,S$GLB,, | Performed by: STUDENT IN AN ORGANIZED HEALTH CARE EDUCATION/TRAINING PROGRAM

## 2022-11-15 RX ORDER — TRIAMCINOLONE ACETONIDE 40 MG/ML
40 INJECTION, SUSPENSION INTRA-ARTICULAR; INTRAMUSCULAR
Status: DISCONTINUED | OUTPATIENT
Start: 2022-11-15 | End: 2022-11-15 | Stop reason: HOSPADM

## 2022-11-15 RX ADMIN — TRIAMCINOLONE ACETONIDE 40 MG: 40 INJECTION, SUSPENSION INTRA-ARTICULAR; INTRAMUSCULAR at 11:11

## 2022-11-15 NOTE — PROGRESS NOTES
Patient ID: Lacey Calderón  YOB: 1955  MRN: [id number]    Chief Complaint: Follow-up (Bilateral knee pain, R > L)        Occupation: Retired    History of Present Illness: Lacey Calderón is a left-hand dominant 67 y.o. female who presents today with 6/10 left knee pain at her follow up visit.     She was first seen by Dr. Root on 10/6/22. She states that the pain began in June 2022. She has a history of rheumatoid arthritis. She describes the pain as intermittent sharp pain. She has used biofreeze, elevation, and tylenol for relief. Her pain is made worse by prolonged sitting.  She has not had any formal treatment for this problem.    Today, she states that she is having greater pain in her right knee than her left. She reports some swelling after walking. She states the pain is around her knee cap, posterior and medial on her right knee and it is a radiating pain on her left knee that goes up and down her leg. She describes the pain as intermittent aching. She uses tylenol extra strength for relief.    The patient is active in none.    ***    Past Medical History:   Past Medical History:   Diagnosis Date    Arthritis     Cardiac arrhythmia 11/13/2020    Cataract     Class 2 severe obesity due to excess calories with serious comorbidity and body mass index (BMI) of 37.0 to 37.9 in adult 10/19/2020    Essential hypertension 10/19/2020    Hypertension     Paroxysmal atrial fibrillation 11/13/2020    Primary localized osteoarthritis of knees, bilateral 10/19/2020    Pure hypercholesterolemia 10/24/2020    Pure hypercholesterolemia 10/24/2020    Type 2 diabetes mellitus with hyperglycemia, with long-term current use of insulin 6/3/2021     Past Surgical History:   Procedure Laterality Date    MULTIPLE TOOTH EXTRACTIONS       Family History   Problem Relation Age of Onset    Arthritis Mother     Heart disease Mother     Miscarriages / Stillbirths Mother     Dementia Mother     Glaucoma Mother      "Cataracts Mother     No Known Problems Father      Social History     Socioeconomic History    Marital status: Unknown   Tobacco Use    Smoking status: Never    Smokeless tobacco: Never   Substance and Sexual Activity    Alcohol use: Never    Drug use: Never    Sexual activity: Not Currently     Partners: Male     Medication List with Changes/Refills   Current Medications    AMLODIPINE (NORVASC) 10 MG TABLET    Take 1 tablet (10 mg total) by mouth once daily.    APIXABAN (ELIQUIS) 5 MG TAB    Take 1 tablet (5 mg total) by mouth 2 (two) times daily.    BD JANE 2ND GEN PEN NEEDLE 32 GAUGE X 5/32" NDLE    Use as directed to inject insulin as prescribed by [facility] provider.    BLOOD SUGAR DIAGNOSTIC STRP    Check blood glucose 4 times daily (BEFORE breakfast or other meal) and as needed for symptoms of low or high blood glucose    BLOOD-GLUCOSE METER KIT    Check blood glucose 4 times daily (BEFORE breakfast or other meal) and as needed for symptoms of low or high blood glucose    CARVEDILOL (COREG) 6.25 MG TABLET    Take 1 tablet (6.25 mg total) by mouth 2 (two) times daily.    CHLORTHALIDONE (HYGROTEN) 25 MG TAB    Take 1 tablet (25 mg total) by mouth once daily.    DIPH,PERTUSS,ACEL,TET-VAC,(ADACEL,TDAP ADOLESN/ADULT,PF)2 LF-(2.5-5-3-5 MCG)-5LF/0.5 ML SYRG        DORZOLAMIDE-TIMOLOL 2-0.5% (COSOPT) 22.3-6.8 MG/ML OPHTHALMIC SOLUTION    Place 1 drop into both eyes every 12 (twelve) hours.    EMPTY CONTAINER (SHARPS CONTAINER) MISC    Use to dispose sharps    INSULIN LISPRO 100 UNIT/ML PEN    Inject 9 Units into the skin 3 (three) times daily before meals.    LANCETS MISC    Check blood glucose 4 times daily (BEFORE breakfast or other meal) and as needed for symptoms of low or high blood glucose    LANTUS SOLOSTAR U-100 INSULIN GLARGINE 100 UNITS/ML SUBQ PEN    Inject 30 Units into the skin nightly.    LATANOPROST 0.005 % OPHTHALMIC SOLUTION    Place into both eyes.    METFORMIN (GLUCOPHAGE-XR) 500 MG ER 24HR TABLET    " Take 1 tablet (500 mg total) by mouth once daily.    PNEUMOVAX-23 25 MCG/0.5 ML VACCINE        POTASSIUM CHLORIDE SA (K-DUR,KLOR-CON M) 10 MEQ TABLET    Take 1 tablet (10 mEq total) by mouth once daily.    ROSUVASTATIN (CRESTOR) 20 MG TABLET    Take 1 tablet (20 mg total) by mouth every evening.    TRAMADOL (ULTRAM) 50 MG TABLET    Take 50 mg by mouth every 6 (six) hours as needed for Pain.    VALSARTAN (DIOVAN) 320 MG TABLET    Take 1 tablet (320 mg total) by mouth once daily.    VITAMIN D (VITAMIN D3) 1000 UNITS TAB    Take 1,000 Units by mouth once daily.     Review of patient's allergies indicates:  No Known Allergies    Physical Exam:   There is no height or weight on file to calculate BMI.    Physical Exam      Detailed MSK exam:   Ortho/SPM Exam     ***    Imaging:  X-ray Knee Ortho Bilateral with Flexion  Narrative: EXAMINATION:  XR KNEE ORTHO BILAT WITH FLEXION    CLINICAL HISTORY:  Pain in right knee    TECHNIQUE:  AP standing of both knees, PA flexion standing views of both knees, and Merchant views of both knees were performed.  Lateral views of both knees were also performed.    COMPARISON:  Radiographs from 10/19/2020    FINDINGS:  Bilateral tricompartmental degenerative changes are seen, right greater than left most severe in the medial compartments with high-grade joint space narrowing adjacent marginal spurring, right greater than left.  Right knee joint effusion is seen.  No left knee joint effusion.  Similar appearing calcific densities along the posterior aspect of the knee joints bilaterally.  Bones appear demineralized.  Soft tissues are normal.  No significant change from prior exam.  Impression: Please see above    Electronically signed by: Raúl Esteban MD  Date:    10/06/2022  Time:    11:02    ***    Relevant imaging results were reviewed and interpreted by me and per my read ***.      This was discussed with the patient and / or family today.       There are no Patient Instructions on  file for this visit.      Braydon Root MD  Primary Care Sports Medicine      Disclaimer: This note was prepared using a voice recognition system and is likely to have sound alike errors within the text.     I spent a total of *** minutes on the day of the visit.This includes face to face time and non-face to face time preparing to see the patient (eg, review of tests), obtaining and/or reviewing separately obtained history, documenting clinical information in the electronic or other health record, independently interpreting results and communicating results to the patient/family/caregiver, or care coordinator.

## 2022-11-15 NOTE — PROCEDURES
Large Joint Aspiration/Injection: R knee    Date/Time: 11/15/2022 11:00 AM  Performed by: Braydon Root MD  Authorized by: Braydon Root MD     Consent Done?:  Yes (Verbal)  Indications:  Arthritis and pain  Site marked: the procedure site was marked    Timeout: prior to procedure the correct patient, procedure, and site was verified      Local anesthesia used?: Yes    Anesthesia:  Local infiltration  Local anesthetic:  Bupivacaine 0.5% without epinephrine, lidocaine 1% without epinephrine and topical anesthetic  Anesthetic total (ml):  4      Details:  Needle Size:  22 G  Ultrasonic Guidance for needle placement?: No    Approach:  Anterolateral  Location:  Knee  Site:  R knee  Medications:  40 mg triamcinolone acetonide 40 mg/mL  Patient tolerance:  Patient tolerated the procedure well with no immediate complications     We discussed the proper protocols after the injection such as no submerging pools, baths tubs, or hot tubs for 48 hr.  Showering is okay today.  We also discussed that blood sugars can be elevated after an injection and asked patient to properly checked her sugars over the next few days and contact their PCP if there are any concerns.  We discussed red flags such as fevers, chills, red, warm, tender joint at the area of injection to please seek medical care immediately.

## 2022-11-15 NOTE — PATIENT INSTRUCTIONS
Assessment:  Lacey Calderón is a 67 y.o. female with a chief complaint of Follow-up (Bilateral knee pain, R > L)      Encounter Diagnoses   Name Primary?    Primary localized osteoarthritis of knees, bilateral Yes    Bilateral chronic knee pain     Obesity (BMI 35.0-39.9 without comorbidity)     Chronic anticoagulation     Essential hypertension     Type 2 diabetes mellitus with microalbuminuria, with long-term current use of insulin         Plan:  Blood pressure much better controlled at this time, appropriate to proceed with CSI  Right knee CSI today 2/2/1 kenalog  We discussed the proper protocols after the injection such as no submerging pools, baths tubs, or hot tubs for 48 hr.  Showering is okay today.  We also discussed that blood sugars can be elevated after an injection and asked patient to properly check their sugars over the next few days and contact their PCP if there are any concerns.  We discussed red flags such as fevers, chills, red, warm, tender joint at the area of injection to please seek medical care immediately.      Follow-up: 8 weeks or sooner if there are any problems between now and then.    Thank you for choosing [facility] and Dr. Braydon Root for your orthopedic & sports medicine care. It is our goal to provide you with exceptional care that will help keep you healthy, active, and get you back in the game.    Please do not hesitate to reach out to us via email, phone, or MyChart with any questions, concerns, or feedback.    If you are experiencing pain/discomfort ,or have questions after 5pm and would like to be connected to the [facility] on-call team, please call this number and specify which Sports Medicine provider is treating you: [phone]

## 2022-12-07 ENCOUNTER — PATIENT OUTREACH (OUTPATIENT)
Dept: ADMINISTRATIVE | Facility: HOSPITAL | Age: 67
End: 2022-12-07
Payer: MEDICARE

## 2022-12-07 NOTE — PROGRESS NOTES
PHN HTN Report: Per chart review, patient had an appointment with Primary care on 11/10/22, BP was 130/80.

## 2022-12-21 NOTE — PROGRESS NOTES
Lacey Fischer.    I am happy to report that your recent breast imaging did NOT show evidence of cancer.    An annual mammogram is the best test to screen for breast cancer, but it is not perfect, and it can miss some cancers. So, even though your mammogram was normal, if you notice any lump or change in one of your breasts, please schedule an appointment with me for a proper evaluation.    Thanks for letting me care for you, and thanks for trusting [facility] with your healthcare needs.    Sincerely,    JAKE Merrill MD
Statement Selected

## 2023-01-18 ENCOUNTER — PATIENT OUTREACH (OUTPATIENT)
Dept: ADMINISTRATIVE | Facility: HOSPITAL | Age: 68
End: 2023-01-18
Payer: MEDICARE

## 2023-01-18 NOTE — PROGRESS NOTES
BR Colon 2022 attestations:  Pt on both list, no action required, pt non compliant with Colon Ca screen.

## 2023-01-26 ENCOUNTER — TELEPHONE (OUTPATIENT)
Dept: OPHTHALMOLOGY | Facility: CLINIC | Age: 68
End: 2023-01-26
Payer: MEDICARE

## 2023-01-26 ENCOUNTER — DOCUMENTATION ONLY (OUTPATIENT)
Dept: SPORTS MEDICINE | Facility: CLINIC | Age: 68
End: 2023-01-26
Payer: MEDICARE

## 2023-01-26 ENCOUNTER — OFFICE VISIT (OUTPATIENT)
Dept: SPORTS MEDICINE | Facility: CLINIC | Age: 68
End: 2023-01-26
Payer: MEDICARE

## 2023-01-26 DIAGNOSIS — M25.562 BILATERAL CHRONIC KNEE PAIN: ICD-10-CM

## 2023-01-26 DIAGNOSIS — Z79.01 CHRONIC ANTICOAGULATION: ICD-10-CM

## 2023-01-26 DIAGNOSIS — E11.29 TYPE 2 DIABETES MELLITUS WITH MICROALBUMINURIA, WITH LONG-TERM CURRENT USE OF INSULIN: ICD-10-CM

## 2023-01-26 DIAGNOSIS — R80.9 TYPE 2 DIABETES MELLITUS WITH MICROALBUMINURIA, WITH LONG-TERM CURRENT USE OF INSULIN: ICD-10-CM

## 2023-01-26 DIAGNOSIS — G89.29 BILATERAL CHRONIC KNEE PAIN: ICD-10-CM

## 2023-01-26 DIAGNOSIS — M25.561 BILATERAL CHRONIC KNEE PAIN: ICD-10-CM

## 2023-01-26 DIAGNOSIS — E66.9 OBESITY (BMI 35.0-39.9 WITHOUT COMORBIDITY): ICD-10-CM

## 2023-01-26 DIAGNOSIS — M17.0 PRIMARY LOCALIZED OSTEOARTHRITIS OF KNEES, BILATERAL: Primary | ICD-10-CM

## 2023-01-26 DIAGNOSIS — I10 ESSENTIAL HYPERTENSION: ICD-10-CM

## 2023-01-26 DIAGNOSIS — Z79.4 TYPE 2 DIABETES MELLITUS WITH MICROALBUMINURIA, WITH LONG-TERM CURRENT USE OF INSULIN: ICD-10-CM

## 2023-01-26 PROCEDURE — 99999 PR PBB SHADOW E&M-EST. PATIENT-LVL IV: CPT | Mod: PBBFAC,,, | Performed by: STUDENT IN AN ORGANIZED HEALTH CARE EDUCATION/TRAINING PROGRAM

## 2023-01-26 PROCEDURE — 3072F PR LOW RISK FOR RETINOPATHY: ICD-10-PCS | Mod: CPTII,S$GLB,, | Performed by: STUDENT IN AN ORGANIZED HEALTH CARE EDUCATION/TRAINING PROGRAM

## 2023-01-26 PROCEDURE — 3288F PR FALLS RISK ASSESSMENT DOCUMENTED: ICD-10-PCS | Mod: CPTII,S$GLB,, | Performed by: STUDENT IN AN ORGANIZED HEALTH CARE EDUCATION/TRAINING PROGRAM

## 2023-01-26 PROCEDURE — 1159F PR MEDICATION LIST DOCUMENTED IN MEDICAL RECORD: ICD-10-PCS | Mod: CPTII,S$GLB,, | Performed by: STUDENT IN AN ORGANIZED HEALTH CARE EDUCATION/TRAINING PROGRAM

## 2023-01-26 PROCEDURE — 3072F LOW RISK FOR RETINOPATHY: CPT | Mod: CPTII,S$GLB,, | Performed by: STUDENT IN AN ORGANIZED HEALTH CARE EDUCATION/TRAINING PROGRAM

## 2023-01-26 PROCEDURE — 1101F PR PT FALLS ASSESS DOC 0-1 FALLS W/OUT INJ PAST YR: ICD-10-PCS | Mod: CPTII,S$GLB,, | Performed by: STUDENT IN AN ORGANIZED HEALTH CARE EDUCATION/TRAINING PROGRAM

## 2023-01-26 PROCEDURE — 1125F PR PAIN SEVERITY QUANTIFIED, PAIN PRESENT: ICD-10-PCS | Mod: CPTII,S$GLB,, | Performed by: STUDENT IN AN ORGANIZED HEALTH CARE EDUCATION/TRAINING PROGRAM

## 2023-01-26 PROCEDURE — 1125F AMNT PAIN NOTED PAIN PRSNT: CPT | Mod: CPTII,S$GLB,, | Performed by: STUDENT IN AN ORGANIZED HEALTH CARE EDUCATION/TRAINING PROGRAM

## 2023-01-26 PROCEDURE — 3288F FALL RISK ASSESSMENT DOCD: CPT | Mod: CPTII,S$GLB,, | Performed by: STUDENT IN AN ORGANIZED HEALTH CARE EDUCATION/TRAINING PROGRAM

## 2023-01-26 PROCEDURE — 20610 DRAIN/INJ JOINT/BURSA W/O US: CPT | Mod: LT,S$GLB,, | Performed by: STUDENT IN AN ORGANIZED HEALTH CARE EDUCATION/TRAINING PROGRAM

## 2023-01-26 PROCEDURE — 20610 LARGE JOINT ASPIRATION/INJECTION: L KNEE: ICD-10-PCS | Mod: LT,S$GLB,, | Performed by: STUDENT IN AN ORGANIZED HEALTH CARE EDUCATION/TRAINING PROGRAM

## 2023-01-26 PROCEDURE — 99214 PR OFFICE/OUTPT VISIT, EST, LEVL IV, 30-39 MIN: ICD-10-PCS | Mod: 25,S$GLB,, | Performed by: STUDENT IN AN ORGANIZED HEALTH CARE EDUCATION/TRAINING PROGRAM

## 2023-01-26 PROCEDURE — 1159F MED LIST DOCD IN RCRD: CPT | Mod: CPTII,S$GLB,, | Performed by: STUDENT IN AN ORGANIZED HEALTH CARE EDUCATION/TRAINING PROGRAM

## 2023-01-26 PROCEDURE — 1101F PT FALLS ASSESS-DOCD LE1/YR: CPT | Mod: CPTII,S$GLB,, | Performed by: STUDENT IN AN ORGANIZED HEALTH CARE EDUCATION/TRAINING PROGRAM

## 2023-01-26 PROCEDURE — 99214 OFFICE O/P EST MOD 30 MIN: CPT | Mod: 25,S$GLB,, | Performed by: STUDENT IN AN ORGANIZED HEALTH CARE EDUCATION/TRAINING PROGRAM

## 2023-01-26 PROCEDURE — 99999 PR PBB SHADOW E&M-EST. PATIENT-LVL IV: ICD-10-PCS | Mod: PBBFAC,,, | Performed by: STUDENT IN AN ORGANIZED HEALTH CARE EDUCATION/TRAINING PROGRAM

## 2023-01-26 RX ORDER — TRIAMCINOLONE ACETONIDE 40 MG/ML
40 INJECTION, SUSPENSION INTRA-ARTICULAR; INTRAMUSCULAR
Status: DISCONTINUED | OUTPATIENT
Start: 2023-01-26 | End: 2023-01-26 | Stop reason: HOSPADM

## 2023-01-26 RX ADMIN — TRIAMCINOLONE ACETONIDE 40 MG: 40 INJECTION, SUSPENSION INTRA-ARTICULAR; INTRAMUSCULAR at 08:01

## 2023-01-26 NOTE — PROGRESS NOTES
Patient ID: Lacey Calderón  YOB: 1955  MRN: [id number]    Chief Complaint: Follow-up (Armando knee pain s/p R knee CSI 11/15/22)    Referred By: Clarita Bhatti NP    Occupation: Retired    History of Present Illness: Lacey Calderón is a 68 y.o. female who presents today with 6/10 bilateral knee pain at her follow up visit. Her right knee pain is greater than left.    Patient was first seen on 10/6/22. She states that the pain began in June 2022. She has a history of rheumatoid arthritis. She describes the pain as intermittent sharp pain. She has used biofreeze, elevation, and tylenol for relief. Her pain is made worse by prolonged sitting.  She has not had any formal treatment for this problem.    Right knee CSI was administered on 11/15/22. Patient reports one month of relief. Pain has increased since [place]. She describes the pas as an intermittent aching, throbbing, and sharp pain. She is taking tylenol and alternating ice and heat for relief. She states prolonged sitting and stepping down makes the pain worse.    The patient is active in none.      Past Medical History:   Past Medical History:   Diagnosis Date    Arthritis     Cardiac arrhythmia 11/13/2020    Cataract     Class 2 severe obesity due to excess calories with serious comorbidity and body mass index (BMI) of 37.0 to 37.9 in adult 10/19/2020    Essential hypertension 10/19/2020    Hypertension     Paroxysmal atrial fibrillation 11/13/2020    Primary localized osteoarthritis of knees, bilateral 10/19/2020    Pure hypercholesterolemia 10/24/2020    Pure hypercholesterolemia 10/24/2020    Type 2 diabetes mellitus with hyperglycemia, with long-term current use of insulin 6/3/2021     Past Surgical History:   Procedure Laterality Date    MULTIPLE TOOTH EXTRACTIONS       Family History   Problem Relation Age of Onset    Arthritis Mother     Heart disease Mother     Miscarriages / Stillbirths Mother     Dementia Mother     Glaucoma Mother  "    Cataracts Mother     No Known Problems Father      Social History     Socioeconomic History    Marital status: Unknown   Tobacco Use    Smoking status: Never    Smokeless tobacco: Never   Substance and Sexual Activity    Alcohol use: Never    Drug use: Never    Sexual activity: Not Currently     Partners: Male     Medication List with Changes/Refills   Current Medications    AMLODIPINE (NORVASC) 10 MG TABLET    Take 1 tablet by mouth once daily    BD JANE 2ND GEN PEN NEEDLE 32 GAUGE X 5/32" NDLE    Use as directed to inject insulin as prescribed by [facility] provider.    BLOOD SUGAR DIAGNOSTIC STRP    Check blood glucose 4 times daily (BEFORE breakfast or other meal) and as needed for symptoms of low or high blood glucose    BLOOD-GLUCOSE METER KIT    Check blood glucose 4 times daily (BEFORE breakfast or other meal) and as needed for symptoms of low or high blood glucose    CARVEDILOL (COREG) 6.25 MG TABLET    Take 1 tablet by mouth twice daily    CHLORTHALIDONE (HYGROTEN) 25 MG TAB    Take 1 tablet (25 mg total) by mouth once daily.    DIPH,PERTUSS,ACEL,TET-VAC,(ADACEL,TDAP ADOLESN/ADULT,PF)2 LF-(2.5-5-3-5 MCG)-5LF/0.5 ML SYRG        DORZOLAMIDE-TIMOLOL 2-0.5% (COSOPT) 22.3-6.8 MG/ML OPHTHALMIC SOLUTION    Place 1 drop into both eyes every 12 (twelve) hours.    EMPTY CONTAINER (SHARPS CONTAINER) MISC    Use to dispose sharps    INSULIN LISPRO 100 UNIT/ML PEN    Inject 9 Units into the skin 3 (three) times daily before meals.    LANCETS MISC    Check blood glucose 4 times daily (BEFORE breakfast or other meal) and as needed for symptoms of low or high blood glucose    LANTUS SOLOSTAR U-100 INSULIN GLARGINE 100 UNITS/ML SUBQ PEN    Inject 30 Units into the skin nightly.    LATANOPROST 0.005 % OPHTHALMIC SOLUTION    Place into both eyes.    METFORMIN (GLUCOPHAGE-XR) 500 MG ER 24HR TABLET    Take 1 tablet (500 mg total) by mouth once daily.    PNEUMOVAX-23 25 MCG/0.5 ML VACCINE        POTASSIUM CHLORIDE SA " (K-DUR,KLOR-CON M) 10 MEQ TABLET    Take 1 tablet (10 mEq total) by mouth once daily.    ROSUVASTATIN (CRESTOR) 20 MG TABLET    Take 1 tablet (20 mg total) by mouth every evening.    TRAMADOL (ULTRAM) 50 MG TABLET    Take 50 mg by mouth every 6 (six) hours as needed for Pain.    VALSARTAN (DIOVAN) 320 MG TABLET    Take 1 tablet (320 mg total) by mouth once daily.    VITAMIN D (VITAMIN D3) 1000 UNITS TAB    Take 1,000 Units by mouth once daily.     Review of patient's allergies indicates:  No Known Allergies    Physical Exam:   There is no height or weight on file to calculate BMI.    Physical Exam  Constitutional:       General: She is not in acute distress.     Appearance: Normal appearance. She is obese.   HENT:      Head: Normocephalic and atraumatic.   Eyes:      Extraocular Movements: Extraocular movements intact.      Conjunctiva/sclera: Conjunctivae normal.   Pulmonary:      Effort: Pulmonary effort is normal. No respiratory distress.   Musculoskeletal:      Right knee: No swelling.      Instability Tests: Medial Rogelio test negative and lateral Rogelio test negative.      Left knee: No swelling or effusion.      Instability Tests: Medial Rogelio test negative and lateral Rogelio test negative.   Skin:     General: Skin is warm and dry.   Neurological:      General: No focal deficit present.      Mental Status: She is alert and oriented to person, place, and time.   Psychiatric:         Mood and Affect: Mood normal.         Detailed MSK exam:   General    Constitutional: She is oriented to person, place, and time. No distress.   HENT:   Head: Normocephalic and atraumatic.   Eyes: Conjunctivae are normal.   Pulmonary/Chest: Effort normal. No respiratory distress.   Abdominal: Normal appearance.   Neurological: She is alert and oriented to person, place, and time.   Psychiatric: Mood normal.           Right Knee Exam     Inspection   Swelling: absent  Bruising: absent    Tenderness   The patient is tender  to palpation of the condyle, medial joint line and lateral joint line.    Crepitus   The patient has crepitus of the lateral joint line and medial joint line.    Range of Motion   Extension:  normal   Flexion:  normal     Tests   Meniscus   Rogelio:  Medial - negative Lateral - negative    Left Knee Exam     Inspection   Swelling: absent  Effusion: absent    Tenderness   The patient tender to palpation of the condyle, medial joint line and lateral joint line.    Crepitus   The patient has crepitus of the lateral joint line and medial joint line.    Range of Motion   Extension:  normal   Flexion:  normal     Tests   Meniscus   Rogelio:  Medial - negative Lateral - negative    Muscle Strength   Right Lower Extremity   Hip Abduction: 4/5   Quadriceps:  4/5   Hamstrin/5   Left Lower Extremity   Hip Abduction: 4/5   Hamstrin/5        Imaging:  X-ray Knee Ortho Bilateral with Flexion  Narrative: EXAMINATION:  XR KNEE ORTHO BILAT WITH FLEXION    CLINICAL HISTORY:  Pain in right knee    TECHNIQUE:  AP standing of both knees, PA flexion standing views of both knees, and Merchant views of both knees were performed.  Lateral views of both knees were also performed.    COMPARISON:  Radiographs from 10/19/2020    FINDINGS:  Bilateral tricompartmental degenerative changes are seen, right greater than left most severe in the medial compartments with high-grade joint space narrowing adjacent marginal spurring, right greater than left.  Right knee joint effusion is seen.  No left knee joint effusion.  Similar appearing calcific densities along the posterior aspect of the knee joints bilaterally.  Bones appear demineralized.  Soft tissues are normal.  No significant change from prior exam.  Impression: Please see above    Electronically signed by: Raúl Esteban MD  Date:    10/06/2022  Time:    11:02      Relevant imaging results were reviewed and interpreted by me and per my read.      This was discussed with the patient  and / or family today.       Patient Instructions   Assessment:  Lacey Calderón is a 68 y.o. female with a chief complaint of Follow-up (Armando knee pain s/p R knee CSI 11/15/22)    Encounter Diagnoses   Name Primary?    Primary localized osteoarthritis of knees, bilateral Yes    Obesity (BMI 35.0-39.9 without comorbidity)     Type 2 diabetes mellitus with microalbuminuria, with long-term current use of insulin     Essential hypertension     Chronic anticoagulation     Bilateral chronic knee pain       Plan:  Ms. Calderón has not experienced significant relief with cortisone injection.    Med  brace ordered, awaiting status from DME office  She is now having similar symptoms in the left knee.  Cortisone injection 2/2/1 Kenalog left knee today  Order viscosupplementation for right knee today - Euflexxa  Order PT at [facility] - 2-3x per week for 6-8 weeks   Counseled the patient to resume her diabetes medications and discuss her side effects with Dr. Merrill    Follow-up: 3-4 weeks for visco or sooner if there are any problems between now and then.    Thank you for choosing [facility] and Dr. Braydon Root for your orthopedic & sports medicine care. It is our goal to provide you with exceptional care that will help keep you healthy, active, and get you back in the game.    Please do not hesitate to reach out to us via email, phone, or MyChart with any questions, concerns, or feedback.    If you are experiencing pain/discomfort ,or have questions after 5pm and would like to be connected to the [facility] [place] on-call team, please call this number and specify which Sports Medicine provider is treating you: [phone]      MEDICAL NECESSITY FOR VISCOSUPPLEMENTATION: After thorough evaluation of the patient, I have determined that visco-supplementation is medically necessary. The patient has painful DJD of the knee with failure of conservative therapy  including lifestyle modifications and rehabilitation exercises. Oral analgesis/NSAIDs have not adequately controlled symptoms and there is radiographic evidence of joint space narrowing, subchondral sclerosis, and some early osteophytic changes, or in lack of radiographic evidence, there is arthroscopic or other evidence of chondrosis.  Kellgren- Matt grade 2 or greater.            Braydon Root MD  Primary Care Sports Medicine      Disclaimer: This note was prepared using a voice recognition system and is likely to have sound alike errors within the text.

## 2023-01-26 NOTE — PATIENT INSTRUCTIONS
Assessment:  Lacey Calderón is a 68 y.o. female with a chief complaint of Follow-up (Armando knee pain s/p R knee CSI 11/15/22)    Encounter Diagnoses   Name Primary?    Primary localized osteoarthritis of knees, bilateral Yes    Obesity (BMI 35.0-39.9 without comorbidity)     Type 2 diabetes mellitus with microalbuminuria, with long-term current use of insulin     Essential hypertension     Chronic anticoagulation     Bilateral chronic knee pain       Plan:  Ms. Calderón has not experienced significant relief with cortisone injection.    Med  brace ordered, awaiting status from DME office  She is now having similar symptoms in the left knee.  Cortisone injection 2/2/1 Kenalog left knee today  Order viscosupplementation for right knee today - Euflexxa  Order PT at [facility] - 2-3x per week for 6-8 weeks   Counseled the patient to resume her diabetes medications and discuss her side effects with Dr. Merrill    Follow-up: 3-4 weeks for visco or sooner if there are any problems between now and then.    Thank you for choosing [facility] [place] [facility] and Dr. Braydon Root for your orthopedic & sports medicine care. It is our goal to provide you with exceptional care that will help keep you healthy, active, and get you back in the game.    Please do not hesitate to reach out to us via email, phone, or MyChart with any questions, concerns, or feedback.    If you are experiencing pain/discomfort ,or have questions after 5pm and would like to be connected to the [facility] [place] on-call team, please call this number and specify which Sports Medicine provider is treating you: [phone]      MEDICAL NECESSITY FOR VISCOSUPPLEMENTATION: After thorough evaluation of the patient, I have determined that visco-supplementation is medically necessary. The patient has painful DJD of the knee with failure of conservative therapy including lifestyle modifications and rehabilitation  exercises. Oral analgesis/NSAIDs have not adequately controlled symptoms and there is radiographic evidence of joint space narrowing, subchondral sclerosis, and some early osteophytic changes, or in lack of radiographic evidence, there is arthroscopic or other evidence of chondrosis.  Kellgren- Matt grade 2 or greater.

## 2023-01-26 NOTE — TELEPHONE ENCOUNTER
Spoke with pt and explained to her that her glasses Rx has  and that she needs to have an updated one when she comes in.

## 2023-01-26 NOTE — PROCEDURES
Large Joint Aspiration/Injection: L knee    Date/Time: 1/26/2023 8:30 AM  Performed by: Braydon Root MD  Authorized by: Braydon Root MD     Consent Done?:  Yes (Verbal)  Indications:  Arthritis and pain  Site marked: the procedure site was marked    Timeout: prior to procedure the correct patient, procedure, and site was verified    Prep: patient was prepped and draped in usual sterile fashion      Local anesthesia used?: Yes    Anesthesia:  Local infiltration  Local anesthetic:  Bupivacaine 0.5% without epinephrine, lidocaine 1% without epinephrine and topical anesthetic  Anesthetic total (ml):  4      Details:  Needle Size:  22 G  Ultrasonic Guidance for needle placement?: No    Approach:  Anterolateral  Location:  Knee  Site:  L knee  Medications:  40 mg triamcinolone acetonide 40 mg/mL  Patient tolerance:  Patient tolerated the procedure well with no immediate complications     We discussed the proper protocols after the injection such as no submerging pools, baths tubs, or hot tubs for 48 hr.  Showering is okay today.  We also discussed that blood sugars can be elevated after an injection and asked patient to properly check their sugars over the next few days and contact their PCP if there are any concerns.  We discussed red flags such as fevers, chills, red, warm, tender joint at the area of injection to please seek medical care immediately.

## 2023-01-26 NOTE — PROGRESS NOTES
PT referral fax[facility] to [facility].

## 2023-01-26 NOTE — TELEPHONE ENCOUNTER
----- Message from Eli Mason sent at 1/26/2023  3:39 PM CST -----  [phone] pt would like a copy of eye glass rx mailed to home address please advise

## 2023-02-09 ENCOUNTER — LAB VISIT (OUTPATIENT)
Dept: LAB | Facility: HOSPITAL | Age: 68
End: 2023-02-09
Attending: FAMILY MEDICINE
Payer: MEDICARE

## 2023-02-09 DIAGNOSIS — E11.29 TYPE 2 DIABETES MELLITUS WITH MICROALBUMINURIA, WITH LONG-TERM CURRENT USE OF INSULIN: Chronic | ICD-10-CM

## 2023-02-09 DIAGNOSIS — I48.0 PAROXYSMAL ATRIAL FIBRILLATION: Chronic | ICD-10-CM

## 2023-02-09 DIAGNOSIS — I10 ESSENTIAL HYPERTENSION: Chronic | ICD-10-CM

## 2023-02-09 DIAGNOSIS — R80.9 TYPE 2 DIABETES MELLITUS WITH MICROALBUMINURIA, WITH LONG-TERM CURRENT USE OF INSULIN: Chronic | ICD-10-CM

## 2023-02-09 DIAGNOSIS — Z79.4 TYPE 2 DIABETES MELLITUS WITH MICROALBUMINURIA, WITH LONG-TERM CURRENT USE OF INSULIN: Chronic | ICD-10-CM

## 2023-02-09 DIAGNOSIS — E78.00 PURE HYPERCHOLESTEROLEMIA: Chronic | ICD-10-CM

## 2023-02-09 LAB
ALBUMIN SERPL BCP-MCNC: 3.8 G/DL (ref 3.5–5.2)
ALP SERPL-CCNC: 73 U/L (ref 55–135)
ALT SERPL W/O P-5'-P-CCNC: 18 U/L (ref 10–44)
ANION GAP SERPL CALC-SCNC: 9 MMOL/L (ref 8–16)
AST SERPL-CCNC: 16 U/L (ref 10–40)
BILIRUB SERPL-MCNC: 0.4 MG/DL (ref 0.1–1)
BUN SERPL-MCNC: 16 MG/DL (ref 8–23)
CALCIUM SERPL-MCNC: 9.9 MG/DL (ref 8.7–10.5)
CHLORIDE SERPL-SCNC: 104 MMOL/L (ref 95–110)
CO2 SERPL-SCNC: 29 MMOL/L (ref 23–29)
CREAT SERPL-MCNC: 0.8 MG/DL (ref 0.5–1.4)
ERYTHROCYTE [DISTWIDTH] IN BLOOD BY AUTOMATED COUNT: 16.2 % (ref 11.5–14.5)
EST. GFR  (NO RACE VARIABLE): >60 ML/MIN/1.73 M^2
ESTIMATED AVG GLUCOSE: 128 MG/DL (ref 68–131)
GLUCOSE SERPL-MCNC: 85 MG/DL (ref 70–110)
HBA1C MFR BLD: 6.1 % (ref 4–5.6)
HCT VFR BLD AUTO: 39.3 % (ref 37–48.5)
HGB BLD-MCNC: 12.5 G/DL (ref 12–16)
MCH RBC QN AUTO: 27.2 PG (ref 27–31)
MCHC RBC AUTO-ENTMCNC: 31.8 G/DL (ref 32–36)
MCV RBC AUTO: 86 FL (ref 82–98)
PLATELET # BLD AUTO: 262 K/UL (ref 150–450)
PMV BLD AUTO: 12 FL (ref 9.2–12.9)
POTASSIUM SERPL-SCNC: 3.4 MMOL/L (ref 3.5–5.1)
PROT SERPL-MCNC: 6.8 G/DL (ref 6–8.4)
RBC # BLD AUTO: 4.59 M/UL (ref 4–5.4)
SODIUM SERPL-SCNC: 142 MMOL/L (ref 136–145)
WBC # BLD AUTO: 5.67 K/UL (ref 3.9–12.7)

## 2023-02-09 PROCEDURE — 80053 COMPREHEN METABOLIC PANEL: CPT | Performed by: FAMILY MEDICINE

## 2023-02-09 PROCEDURE — 83036 HEMOGLOBIN GLYCOSYLATED A1C: CPT | Performed by: FAMILY MEDICINE

## 2023-02-09 PROCEDURE — 85027 COMPLETE CBC AUTOMATED: CPT | Performed by: FAMILY MEDICINE

## 2023-02-09 PROCEDURE — 36415 COLL VENOUS BLD VENIPUNCTURE: CPT | Performed by: FAMILY MEDICINE

## 2023-02-14 ENCOUNTER — OFFICE VISIT (OUTPATIENT)
Dept: OPHTHALMOLOGY | Facility: CLINIC | Age: 68
End: 2023-02-14
Payer: MEDICARE

## 2023-02-14 DIAGNOSIS — H40.1131 PRIMARY OPEN ANGLE GLAUCOMA (POAG) OF BOTH EYES, MILD STAGE: Primary | ICD-10-CM

## 2023-02-14 DIAGNOSIS — H25.011 CORTICAL SENILE CATARACT OF RIGHT EYE: ICD-10-CM

## 2023-02-14 DIAGNOSIS — Z83.511 FAMILY HISTORY OF GLAUCOMA: ICD-10-CM

## 2023-02-14 DIAGNOSIS — H25.012 CORTICAL SENILE CATARACT OF LEFT EYE: ICD-10-CM

## 2023-02-14 PROCEDURE — 99999 PR PBB SHADOW E&M-EST. PATIENT-LVL III: CPT | Mod: PBBFAC,,, | Performed by: OPHTHALMOLOGY

## 2023-02-14 PROCEDURE — 99214 PR OFFICE/OUTPT VISIT, EST, LEVL IV, 30-39 MIN: ICD-10-PCS | Mod: S$GLB,,, | Performed by: OPHTHALMOLOGY

## 2023-02-14 PROCEDURE — 3044F PR MOST RECENT HEMOGLOBIN A1C LEVEL <7.0%: ICD-10-PCS | Mod: CPTII,S$GLB,, | Performed by: OPHTHALMOLOGY

## 2023-02-14 PROCEDURE — 1159F PR MEDICATION LIST DOCUMENTED IN MEDICAL RECORD: ICD-10-PCS | Mod: CPTII,S$GLB,, | Performed by: OPHTHALMOLOGY

## 2023-02-14 PROCEDURE — 1159F MED LIST DOCD IN RCRD: CPT | Mod: CPTII,S$GLB,, | Performed by: OPHTHALMOLOGY

## 2023-02-14 PROCEDURE — 99214 OFFICE O/P EST MOD 30 MIN: CPT | Mod: S$GLB,,, | Performed by: OPHTHALMOLOGY

## 2023-02-14 PROCEDURE — 1160F RVW MEDS BY RX/DR IN RCRD: CPT | Mod: CPTII,S$GLB,, | Performed by: OPHTHALMOLOGY

## 2023-02-14 PROCEDURE — 99999 PR PBB SHADOW E&M-EST. PATIENT-LVL III: ICD-10-PCS | Mod: PBBFAC,,, | Performed by: OPHTHALMOLOGY

## 2023-02-14 PROCEDURE — 1160F PR REVIEW ALL MEDS BY PRESCRIBER/CLIN PHARMACIST DOCUMENTED: ICD-10-PCS | Mod: CPTII,S$GLB,, | Performed by: OPHTHALMOLOGY

## 2023-02-14 PROCEDURE — 3044F HG A1C LEVEL LT 7.0%: CPT | Mod: CPTII,S$GLB,, | Performed by: OPHTHALMOLOGY

## 2023-02-14 RX ORDER — DORZOLAMIDE HYDROCHLORIDE AND TIMOLOL MALEATE 20; 5 MG/ML; MG/ML
1 SOLUTION/ DROPS OPHTHALMIC EVERY 12 HOURS
Qty: 30 ML | Refills: 6 | Status: SHIPPED | OUTPATIENT
Start: 2023-02-14 | End: 2023-10-20 | Stop reason: SDUPTHER

## 2023-02-14 NOTE — PROGRESS NOTES
HPI     Glaucoma            Comments: Patient reports for 5 month IOP check. Using gtts as advised.   Denies pain or irritation at this time. Patient reports of visual   stability since previous visit.             Comments    DMII 5/2021   HTN  CATARACTS  SCOAG by c/d and IOP  Gcl : 29-26--3/30/2022    Fam H/O Glaucoma - Mom      Cosopt BID OU            Last edited by Tanner Haque MD on 2/14/2023  8:58 AM.            Assessment /Plan     For exam results, see Encounter Report.      ICD-10-CM ICD-9-CM    1. Primary open angle glaucoma (POAG) of both eyes, mild stage  H40.1131 365.11 Doing well - intraocular pressure is within acceptable range relative to target pressure with no evidence of progression.   Continue current treatment.  Reviewed importance of continued compliance with treatment and follow up.        365.71       2. Cortical senile cataract of left eye  H25.012 366.15   You were found to have an early cataract in your eye(s) today, however the cataract is not affecting your activities of daily living, such as reading and driving.You do not need  surgery at this time. We will recheck your cataract at your next visit. You are welcome to call for an earlier appointment if your vision gets worse.         3. Cortical senile cataract of right eye  H25.011 366.15   You were found to have an early cataract in your eye(s) today, however the cataract is not affecting your activities of daily living, such as reading and driving.You do not need  surgery at this time. We will recheck your cataract at your next visit. You are welcome to call for an earlier appointment if your vision gets worse.         4. Family history of glaucoma  Z83.511 V19.11       Disp MR     RETURN TO CLINIC 4 months DOA   Cosopt BID OU

## 2023-02-16 ENCOUNTER — TELEPHONE (OUTPATIENT)
Dept: SPORTS MEDICINE | Facility: CLINIC | Age: 68
End: 2023-02-16
Payer: MEDICARE

## 2023-02-16 NOTE — TELEPHONE ENCOUNTER
LVM for pt to call [facility] to reschedule first euflexxa injection. Provided call back number    ----- Message from Ronald Andrews sent at 2/16/2023  4:18 PM CST -----  Contact: Self - [phone]  Patient is calling in to see if she can reschedule her appointment scheduled for 02/21/2023 to 02/22/2023 due to issues with transportation. I advised patient next available appointment in system. Please give her a call back at [phone]. Thanks

## 2023-02-16 NOTE — TELEPHONE ENCOUNTER
Spoke with pt and r/s euflexxa injection series one wek later to accommodate for transportation. Pt verablized understanding of updated injection appt times and locations. Pt was grateful for the call.    ----- Message from Kike Bolivar sent at 2/16/2023  4:38 PM CST -----  Contact: Self  ..Type:  Patient Returning Call    Who Called: .Lacey Calderón  Who Left Message for Patient:   Does the patient know what this is regarding?: injection   Would the patient rather a call back or a response via [facility]?  Call back   Best Call Back Number .[phone] (home)   Additional Information: pt states she missed a call

## 2023-02-17 DIAGNOSIS — G89.29 CHRONIC PAIN OF RIGHT KNEE: ICD-10-CM

## 2023-02-17 DIAGNOSIS — M25.561 CHRONIC PAIN OF RIGHT KNEE: ICD-10-CM

## 2023-02-17 DIAGNOSIS — M17.11 PRIMARY OSTEOARTHRITIS OF RIGHT KNEE: Primary | ICD-10-CM

## 2023-02-28 ENCOUNTER — OFFICE VISIT (OUTPATIENT)
Dept: SPORTS MEDICINE | Facility: CLINIC | Age: 68
End: 2023-02-28
Payer: MEDICARE

## 2023-02-28 DIAGNOSIS — M17.0 PRIMARY LOCALIZED OSTEOARTHRITIS OF KNEES, BILATERAL: Primary | ICD-10-CM

## 2023-02-28 PROCEDURE — 3072F PR LOW RISK FOR RETINOPATHY: ICD-10-PCS | Mod: CPTII,S$GLB,, | Performed by: STUDENT IN AN ORGANIZED HEALTH CARE EDUCATION/TRAINING PROGRAM

## 2023-02-28 PROCEDURE — 20610 PR DRAIN/INJECT LARGE JOINT/BURSA: ICD-10-PCS | Mod: RT,S$GLB,, | Performed by: STUDENT IN AN ORGANIZED HEALTH CARE EDUCATION/TRAINING PROGRAM

## 2023-02-28 PROCEDURE — 1159F PR MEDICATION LIST DOCUMENTED IN MEDICAL RECORD: ICD-10-PCS | Mod: CPTII,S$GLB,, | Performed by: STUDENT IN AN ORGANIZED HEALTH CARE EDUCATION/TRAINING PROGRAM

## 2023-02-28 PROCEDURE — 20610 DRAIN/INJ JOINT/BURSA W/O US: CPT | Mod: RT,S$GLB,, | Performed by: STUDENT IN AN ORGANIZED HEALTH CARE EDUCATION/TRAINING PROGRAM

## 2023-02-28 PROCEDURE — 1159F MED LIST DOCD IN RCRD: CPT | Mod: CPTII,S$GLB,, | Performed by: STUDENT IN AN ORGANIZED HEALTH CARE EDUCATION/TRAINING PROGRAM

## 2023-02-28 PROCEDURE — 3044F HG A1C LEVEL LT 7.0%: CPT | Mod: CPTII,S$GLB,, | Performed by: STUDENT IN AN ORGANIZED HEALTH CARE EDUCATION/TRAINING PROGRAM

## 2023-02-28 PROCEDURE — 3288F FALL RISK ASSESSMENT DOCD: CPT | Mod: CPTII,S$GLB,, | Performed by: STUDENT IN AN ORGANIZED HEALTH CARE EDUCATION/TRAINING PROGRAM

## 2023-02-28 PROCEDURE — 1160F PR REVIEW ALL MEDS BY PRESCRIBER/CLIN PHARMACIST DOCUMENTED: ICD-10-PCS | Mod: CPTII,S$GLB,, | Performed by: STUDENT IN AN ORGANIZED HEALTH CARE EDUCATION/TRAINING PROGRAM

## 2023-02-28 PROCEDURE — 99499 NO LOS: ICD-10-PCS | Mod: S$GLB,,, | Performed by: STUDENT IN AN ORGANIZED HEALTH CARE EDUCATION/TRAINING PROGRAM

## 2023-02-28 PROCEDURE — 1101F PT FALLS ASSESS-DOCD LE1/YR: CPT | Mod: CPTII,S$GLB,, | Performed by: STUDENT IN AN ORGANIZED HEALTH CARE EDUCATION/TRAINING PROGRAM

## 2023-02-28 PROCEDURE — 1125F AMNT PAIN NOTED PAIN PRSNT: CPT | Mod: CPTII,S$GLB,, | Performed by: STUDENT IN AN ORGANIZED HEALTH CARE EDUCATION/TRAINING PROGRAM

## 2023-02-28 PROCEDURE — 1101F PR PT FALLS ASSESS DOC 0-1 FALLS W/OUT INJ PAST YR: ICD-10-PCS | Mod: CPTII,S$GLB,, | Performed by: STUDENT IN AN ORGANIZED HEALTH CARE EDUCATION/TRAINING PROGRAM

## 2023-02-28 PROCEDURE — 99999 PR PBB SHADOW E&M-EST. PATIENT-LVL III: ICD-10-PCS | Mod: PBBFAC,,, | Performed by: STUDENT IN AN ORGANIZED HEALTH CARE EDUCATION/TRAINING PROGRAM

## 2023-02-28 PROCEDURE — 3288F PR FALLS RISK ASSESSMENT DOCUMENTED: ICD-10-PCS | Mod: CPTII,S$GLB,, | Performed by: STUDENT IN AN ORGANIZED HEALTH CARE EDUCATION/TRAINING PROGRAM

## 2023-02-28 PROCEDURE — 3072F LOW RISK FOR RETINOPATHY: CPT | Mod: CPTII,S$GLB,, | Performed by: STUDENT IN AN ORGANIZED HEALTH CARE EDUCATION/TRAINING PROGRAM

## 2023-02-28 PROCEDURE — 3044F PR MOST RECENT HEMOGLOBIN A1C LEVEL <7.0%: ICD-10-PCS | Mod: CPTII,S$GLB,, | Performed by: STUDENT IN AN ORGANIZED HEALTH CARE EDUCATION/TRAINING PROGRAM

## 2023-02-28 PROCEDURE — 99499 UNLISTED E&M SERVICE: CPT | Mod: S$GLB,,, | Performed by: STUDENT IN AN ORGANIZED HEALTH CARE EDUCATION/TRAINING PROGRAM

## 2023-02-28 PROCEDURE — 99999 PR PBB SHADOW E&M-EST. PATIENT-LVL III: CPT | Mod: PBBFAC,,, | Performed by: STUDENT IN AN ORGANIZED HEALTH CARE EDUCATION/TRAINING PROGRAM

## 2023-02-28 PROCEDURE — 1125F PR PAIN SEVERITY QUANTIFIED, PAIN PRESENT: ICD-10-PCS | Mod: CPTII,S$GLB,, | Performed by: STUDENT IN AN ORGANIZED HEALTH CARE EDUCATION/TRAINING PROGRAM

## 2023-02-28 PROCEDURE — 1160F RVW MEDS BY RX/DR IN RCRD: CPT | Mod: CPTII,S$GLB,, | Performed by: STUDENT IN AN ORGANIZED HEALTH CARE EDUCATION/TRAINING PROGRAM

## 2023-02-28 NOTE — PATIENT INSTRUCTIONS
Assessment:  Lacey Calderón is a 68 y.o. female with a chief complaint of Injections (R knee Euflexxa 1/3)      Encounter Diagnosis   Name Primary?    Primary localized osteoarthritis of knees, bilateral Yes        Plan:  Right knee Euflexxa injection performed today  We discussed the proper protocols after the injection such as no submerging pools, baths tubs, or hot tubs for 48 hr.  Showering is okay today.  We also discussed that blood sugars can be elevated after an injection and asked patient to properly check their sugars over the next few days and contact their PCP if there are any concerns.  We discussed red flags such as fevers, chills, red, warm, tender joint at the area of injection to please seek medical care immediately.       Follow-up: 3/7/23 for Euflexxa injection or sooner if there are any problems between now and then.    Thank you for choosing [facility] [place] and Dr. Braydon Root for your orthopedic & sports medicine care. It is our goal to provide you with exceptional care that will help keep you healthy, active, and get you back in the game.    Please do not hesitate to reach out to us via email, phone, or MyChart with any questions, concerns, or feedback.    If you are experiencing pain/discomfort ,or have questions after 5pm and would like to be connected to the [facility] [place] [place] on-call team, please call this number and specify which Sports Medicine provider is treating you: [phone]

## 2023-02-28 NOTE — PROCEDURES
Right Knee Euflexxa Injection #1/3    Date/Time: 2/28/2023 8:40 AM  Performed by: Braydon Root MD  Authorized by: Braydon Root MD     Consent Done?:  Yes (Verbal)  Indications:  Arthritis and pain  Site marked: the procedure site was marked    Timeout: prior to procedure the correct patient, procedure, and site was verified    Prep: patient was prepped and draped in usual sterile fashion      Local anesthesia used?: Yes    Local anesthetic:  Topical anesthetic    Details:  Needle Size:  22 G  Ultrasonic Guidance for needle placement?: No    Approach:  Anterolateral  Location:  Knee  Medications:  10 mg sodium hyaluronate (EUFLEXXA) 10 mg/mL(mw 2.4 -3.6 million)  Patient tolerance:  Patient tolerated the procedure well with no immediate complications     We discussed the proper protocols after the injection such as no submerging pools, baths tubs, or hot tubs for 48 hr.  Showering is okay today.  We also discussed that blood sugars can be elevated after an injection and asked patient to properly check their sugars over the next few days and contact their PCP if there are any concerns.  We discussed red flags such as fevers, chills, red, warm, tender joint at the area of injection to please seek medical care immediately.

## 2023-02-28 NOTE — PROGRESS NOTES
Refer to procedure note for details of today's procedure only visit.    Braydon Root MD  Primary Care Sports Medicine

## 2023-03-07 ENCOUNTER — OFFICE VISIT (OUTPATIENT)
Dept: SPORTS MEDICINE | Facility: CLINIC | Age: 68
End: 2023-03-07
Payer: MEDICARE

## 2023-03-07 DIAGNOSIS — M25.562 BILATERAL CHRONIC KNEE PAIN: ICD-10-CM

## 2023-03-07 DIAGNOSIS — M25.561 BILATERAL CHRONIC KNEE PAIN: ICD-10-CM

## 2023-03-07 DIAGNOSIS — G89.29 BILATERAL CHRONIC KNEE PAIN: ICD-10-CM

## 2023-03-07 DIAGNOSIS — M17.0 PRIMARY LOCALIZED OSTEOARTHRITIS OF KNEES, BILATERAL: Primary | ICD-10-CM

## 2023-03-07 PROCEDURE — 99999 PR PBB SHADOW E&M-EST. PATIENT-LVL III: CPT | Mod: PBBFAC,,, | Performed by: STUDENT IN AN ORGANIZED HEALTH CARE EDUCATION/TRAINING PROGRAM

## 2023-03-07 PROCEDURE — 99999 PR PBB SHADOW E&M-EST. PATIENT-LVL III: ICD-10-PCS | Mod: PBBFAC,,, | Performed by: STUDENT IN AN ORGANIZED HEALTH CARE EDUCATION/TRAINING PROGRAM

## 2023-03-07 PROCEDURE — 99499 NO LOS: ICD-10-PCS | Mod: S$GLB,,, | Performed by: STUDENT IN AN ORGANIZED HEALTH CARE EDUCATION/TRAINING PROGRAM

## 2023-03-07 PROCEDURE — 1101F PR PT FALLS ASSESS DOC 0-1 FALLS W/OUT INJ PAST YR: ICD-10-PCS | Mod: CPTII,S$GLB,, | Performed by: STUDENT IN AN ORGANIZED HEALTH CARE EDUCATION/TRAINING PROGRAM

## 2023-03-07 PROCEDURE — 1101F PT FALLS ASSESS-DOCD LE1/YR: CPT | Mod: CPTII,S$GLB,, | Performed by: STUDENT IN AN ORGANIZED HEALTH CARE EDUCATION/TRAINING PROGRAM

## 2023-03-07 PROCEDURE — 3288F PR FALLS RISK ASSESSMENT DOCUMENTED: ICD-10-PCS | Mod: CPTII,S$GLB,, | Performed by: STUDENT IN AN ORGANIZED HEALTH CARE EDUCATION/TRAINING PROGRAM

## 2023-03-07 PROCEDURE — 3288F FALL RISK ASSESSMENT DOCD: CPT | Mod: CPTII,S$GLB,, | Performed by: STUDENT IN AN ORGANIZED HEALTH CARE EDUCATION/TRAINING PROGRAM

## 2023-03-07 PROCEDURE — 99499 UNLISTED E&M SERVICE: CPT | Mod: S$GLB,,, | Performed by: STUDENT IN AN ORGANIZED HEALTH CARE EDUCATION/TRAINING PROGRAM

## 2023-03-07 PROCEDURE — 20610 DRAIN/INJ JOINT/BURSA W/O US: CPT | Mod: RT,S$GLB,, | Performed by: STUDENT IN AN ORGANIZED HEALTH CARE EDUCATION/TRAINING PROGRAM

## 2023-03-07 PROCEDURE — 20610: ICD-10-PCS | Mod: RT,S$GLB,, | Performed by: STUDENT IN AN ORGANIZED HEALTH CARE EDUCATION/TRAINING PROGRAM

## 2023-03-07 PROCEDURE — 3072F LOW RISK FOR RETINOPATHY: CPT | Mod: CPTII,S$GLB,, | Performed by: STUDENT IN AN ORGANIZED HEALTH CARE EDUCATION/TRAINING PROGRAM

## 2023-03-07 PROCEDURE — 1125F AMNT PAIN NOTED PAIN PRSNT: CPT | Mod: CPTII,S$GLB,, | Performed by: STUDENT IN AN ORGANIZED HEALTH CARE EDUCATION/TRAINING PROGRAM

## 2023-03-07 PROCEDURE — 1159F MED LIST DOCD IN RCRD: CPT | Mod: CPTII,S$GLB,, | Performed by: STUDENT IN AN ORGANIZED HEALTH CARE EDUCATION/TRAINING PROGRAM

## 2023-03-07 PROCEDURE — 1159F PR MEDICATION LIST DOCUMENTED IN MEDICAL RECORD: ICD-10-PCS | Mod: CPTII,S$GLB,, | Performed by: STUDENT IN AN ORGANIZED HEALTH CARE EDUCATION/TRAINING PROGRAM

## 2023-03-07 PROCEDURE — 3072F PR LOW RISK FOR RETINOPATHY: ICD-10-PCS | Mod: CPTII,S$GLB,, | Performed by: STUDENT IN AN ORGANIZED HEALTH CARE EDUCATION/TRAINING PROGRAM

## 2023-03-07 PROCEDURE — 1160F PR REVIEW ALL MEDS BY PRESCRIBER/CLIN PHARMACIST DOCUMENTED: ICD-10-PCS | Mod: CPTII,S$GLB,, | Performed by: STUDENT IN AN ORGANIZED HEALTH CARE EDUCATION/TRAINING PROGRAM

## 2023-03-07 PROCEDURE — 3044F HG A1C LEVEL LT 7.0%: CPT | Mod: CPTII,S$GLB,, | Performed by: STUDENT IN AN ORGANIZED HEALTH CARE EDUCATION/TRAINING PROGRAM

## 2023-03-07 PROCEDURE — 1125F PR PAIN SEVERITY QUANTIFIED, PAIN PRESENT: ICD-10-PCS | Mod: CPTII,S$GLB,, | Performed by: STUDENT IN AN ORGANIZED HEALTH CARE EDUCATION/TRAINING PROGRAM

## 2023-03-07 PROCEDURE — 3044F PR MOST RECENT HEMOGLOBIN A1C LEVEL <7.0%: ICD-10-PCS | Mod: CPTII,S$GLB,, | Performed by: STUDENT IN AN ORGANIZED HEALTH CARE EDUCATION/TRAINING PROGRAM

## 2023-03-07 PROCEDURE — 1160F RVW MEDS BY RX/DR IN RCRD: CPT | Mod: CPTII,S$GLB,, | Performed by: STUDENT IN AN ORGANIZED HEALTH CARE EDUCATION/TRAINING PROGRAM

## 2023-03-07 NOTE — PROCEDURES
R knee Euflexxa Inj #2/3    Date/Time: 3/7/2023 8:40 AM  Performed by: Braydon Root MD  Authorized by: Braydon Root MD     Consent Done?:  Yes (Verbal)  Indications:  Arthritis and pain  Site marked: the procedure site was marked    Timeout: prior to procedure the correct patient, procedure, and site was verified    Prep: patient was prepped and draped in usual sterile fashion      Local anesthesia used?: Yes    Local anesthetic:  Topical anesthetic    Details:  Needle Size:  22 G  Ultrasonic Guidance for needle placement?: No    Approach:  Anterolateral  Location:  Knee  Site:  R knee  Medications:  10 mg sodium hyaluronate (EUFLEXXA) 10 mg/mL(mw 2.4 -3.6 million)  Patient tolerance:  Patient tolerated the procedure well with no immediate complications     We discussed the proper protocols after the injection such as no submerging pools, baths tubs, or hot tubs for 48 hr.  Showering is okay today.  We also discussed that blood sugars can be elevated after an injection and asked patient to properly check their sugars over the next few days and contact their PCP if there are any concerns.  We discussed red flags such as fevers, chills, red, warm, tender joint at the area of injection to please seek medical care immediately.

## 2023-03-14 ENCOUNTER — OFFICE VISIT (OUTPATIENT)
Dept: SPORTS MEDICINE | Facility: CLINIC | Age: 68
End: 2023-03-14
Payer: MEDICARE

## 2023-03-14 DIAGNOSIS — M17.0 PRIMARY LOCALIZED OSTEOARTHRITIS OF KNEES, BILATERAL: Primary | ICD-10-CM

## 2023-03-14 DIAGNOSIS — G89.29 BILATERAL CHRONIC KNEE PAIN: ICD-10-CM

## 2023-03-14 DIAGNOSIS — M25.562 BILATERAL CHRONIC KNEE PAIN: ICD-10-CM

## 2023-03-14 DIAGNOSIS — M25.561 BILATERAL CHRONIC KNEE PAIN: ICD-10-CM

## 2023-03-14 PROCEDURE — 1159F MED LIST DOCD IN RCRD: CPT | Mod: CPTII,S$GLB,, | Performed by: STUDENT IN AN ORGANIZED HEALTH CARE EDUCATION/TRAINING PROGRAM

## 2023-03-14 PROCEDURE — 1159F PR MEDICATION LIST DOCUMENTED IN MEDICAL RECORD: ICD-10-PCS | Mod: CPTII,S$GLB,, | Performed by: STUDENT IN AN ORGANIZED HEALTH CARE EDUCATION/TRAINING PROGRAM

## 2023-03-14 PROCEDURE — 99499 UNLISTED E&M SERVICE: CPT | Mod: S$GLB,,, | Performed by: STUDENT IN AN ORGANIZED HEALTH CARE EDUCATION/TRAINING PROGRAM

## 2023-03-14 PROCEDURE — 3044F HG A1C LEVEL LT 7.0%: CPT | Mod: CPTII,S$GLB,, | Performed by: STUDENT IN AN ORGANIZED HEALTH CARE EDUCATION/TRAINING PROGRAM

## 2023-03-14 PROCEDURE — 1160F RVW MEDS BY RX/DR IN RCRD: CPT | Mod: CPTII,S$GLB,, | Performed by: STUDENT IN AN ORGANIZED HEALTH CARE EDUCATION/TRAINING PROGRAM

## 2023-03-14 PROCEDURE — 99999 PR PBB SHADOW E&M-EST. PATIENT-LVL III: CPT | Mod: PBBFAC,,, | Performed by: STUDENT IN AN ORGANIZED HEALTH CARE EDUCATION/TRAINING PROGRAM

## 2023-03-14 PROCEDURE — 99999 PR PBB SHADOW E&M-EST. PATIENT-LVL III: ICD-10-PCS | Mod: PBBFAC,,, | Performed by: STUDENT IN AN ORGANIZED HEALTH CARE EDUCATION/TRAINING PROGRAM

## 2023-03-14 PROCEDURE — 20610: ICD-10-PCS | Mod: RT,S$GLB,, | Performed by: STUDENT IN AN ORGANIZED HEALTH CARE EDUCATION/TRAINING PROGRAM

## 2023-03-14 PROCEDURE — 3044F PR MOST RECENT HEMOGLOBIN A1C LEVEL <7.0%: ICD-10-PCS | Mod: CPTII,S$GLB,, | Performed by: STUDENT IN AN ORGANIZED HEALTH CARE EDUCATION/TRAINING PROGRAM

## 2023-03-14 PROCEDURE — 3072F LOW RISK FOR RETINOPATHY: CPT | Mod: CPTII,S$GLB,, | Performed by: STUDENT IN AN ORGANIZED HEALTH CARE EDUCATION/TRAINING PROGRAM

## 2023-03-14 PROCEDURE — 3072F PR LOW RISK FOR RETINOPATHY: ICD-10-PCS | Mod: CPTII,S$GLB,, | Performed by: STUDENT IN AN ORGANIZED HEALTH CARE EDUCATION/TRAINING PROGRAM

## 2023-03-14 PROCEDURE — 20610 DRAIN/INJ JOINT/BURSA W/O US: CPT | Mod: RT,S$GLB,, | Performed by: STUDENT IN AN ORGANIZED HEALTH CARE EDUCATION/TRAINING PROGRAM

## 2023-03-14 PROCEDURE — 99499 NO LOS: ICD-10-PCS | Mod: S$GLB,,, | Performed by: STUDENT IN AN ORGANIZED HEALTH CARE EDUCATION/TRAINING PROGRAM

## 2023-03-14 PROCEDURE — 1160F PR REVIEW ALL MEDS BY PRESCRIBER/CLIN PHARMACIST DOCUMENTED: ICD-10-PCS | Mod: CPTII,S$GLB,, | Performed by: STUDENT IN AN ORGANIZED HEALTH CARE EDUCATION/TRAINING PROGRAM

## 2023-03-14 NOTE — PROCEDURES
R Knee Euflexxa Inj #3/3: R knee    Date/Time: 3/14/2023 8:40 AM  Performed by: Braydon Root MD  Authorized by: Braydon Root MD     Consent Done?:  Yes (Verbal)  Indications:  Arthritis and pain  Site marked: the procedure site was marked    Timeout: prior to procedure the correct patient, procedure, and site was verified    Prep: patient was prepped and draped in usual sterile fashion      Local anesthesia used?: Yes    Local anesthetic:  Topical anesthetic    Details:  Needle Size:  22 G  Ultrasonic Guidance for needle placement?: No    Approach:  Anterolateral  Location:  Knee  Site:  R knee  Medications:  10 mg sodium hyaluronate (EUFLEXXA) 10 mg/mL(mw 2.4 -3.6 million)  Patient tolerance:  Patient tolerated the procedure well with no immediate complications     We discussed the proper protocols after the injection such as no submerging pools, baths tubs, or hot tubs for 48 hr.  Showering is okay today.  We also discussed that blood sugars can be elevated after an injection and asked patient to properly check their sugars over the next few days and contact their PCP if there are any concerns.  We discussed red flags such as fevers, chills, red, warm, tender joint at the area of injection to please seek medical care immediately.

## 2023-03-16 ENCOUNTER — PATIENT MESSAGE (OUTPATIENT)
Dept: INTERNAL MEDICINE | Facility: CLINIC | Age: 68
End: 2023-03-16
Payer: MEDICARE

## 2023-04-03 DIAGNOSIS — E11.29 TYPE 2 DIABETES MELLITUS WITH MICROALBUMINURIA, WITH LONG-TERM CURRENT USE OF INSULIN: Chronic | ICD-10-CM

## 2023-04-03 DIAGNOSIS — R80.9 TYPE 2 DIABETES MELLITUS WITH MICROALBUMINURIA, WITH LONG-TERM CURRENT USE OF INSULIN: Chronic | ICD-10-CM

## 2023-04-03 DIAGNOSIS — I48.0 PAROXYSMAL ATRIAL FIBRILLATION: Primary | Chronic | ICD-10-CM

## 2023-04-03 DIAGNOSIS — Z00.00 ROUTINE HEALTH MAINTENANCE: ICD-10-CM

## 2023-04-03 DIAGNOSIS — Z79.4 TYPE 2 DIABETES MELLITUS WITH MICROALBUMINURIA, WITH LONG-TERM CURRENT USE OF INSULIN: Chronic | ICD-10-CM

## 2023-04-03 RX ORDER — PEN NEEDLE, DIABETIC 30 GX3/16"
NEEDLE, DISPOSABLE MISCELLANEOUS
Qty: 200 EACH | Refills: 2 | OUTPATIENT
Start: 2023-04-03

## 2023-04-03 NOTE — TELEPHONE ENCOUNTER
Refill Decision Note   Lacey Calderón  is requesting a refill authorization.  Brief Assessment and Rationale for Refill:  Quick Discontinue     Medication Therapy Plan:    Pharmacy is requesting new scripts for the following medications without required information, (sig/ frequency/qty/etc)      Medication Reconciliation Completed: No     Comments: Pharmacies have been requesting medications for patients without required information, (sig, frequency, qty, etc.). In addition, requests are sent for medication(s) pt. are currently not taking, and medications patients have never taken.    We have spoken to the pharmacies about these request types and advised their teams previously that we are unable to assess these New Script requests and require all details for these requests. This is a known issue and has been reported.     Note composed:4:43 PM 04/03/2023

## 2023-04-03 NOTE — TELEPHONE ENCOUNTER
No new care gaps identified.  [facility] Embedded Care Gaps. Reference number: [id number]. 4/03/2023   3:55:45 PM CDT

## 2023-04-19 ENCOUNTER — PATIENT MESSAGE (OUTPATIENT)
Dept: ADMINISTRATIVE | Facility: HOSPITAL | Age: 68
End: 2023-04-19
Payer: MEDICARE

## 2023-04-20 ENCOUNTER — HOSPITAL ENCOUNTER (OUTPATIENT)
Dept: CARDIOLOGY | Facility: HOSPITAL | Age: 68
Discharge: HOME | End: 2023-04-20
Attending: INTERNAL MEDICINE
Payer: MEDICARE

## 2023-04-20 ENCOUNTER — OFFICE VISIT (OUTPATIENT)
Dept: CARDIOLOGY | Facility: CLINIC | Age: 68
End: 2023-04-20
Payer: MEDICARE

## 2023-04-20 VITALS
BODY MASS INDEX: 34.44 KG/M2 | WEIGHT: 214.31 LBS | OXYGEN SATURATION: 99 % | SYSTOLIC BLOOD PRESSURE: 126 MMHG | HEIGHT: 66 IN | HEART RATE: 74 BPM | DIASTOLIC BLOOD PRESSURE: 72 MMHG

## 2023-04-20 DIAGNOSIS — R94.31 EKG ABNORMALITIES: ICD-10-CM

## 2023-04-20 DIAGNOSIS — I10 ESSENTIAL HYPERTENSION: Chronic | ICD-10-CM

## 2023-04-20 DIAGNOSIS — I48.0 PAROXYSMAL ATRIAL FIBRILLATION: Chronic | ICD-10-CM

## 2023-04-20 DIAGNOSIS — E78.00 PURE HYPERCHOLESTEROLEMIA: Chronic | ICD-10-CM

## 2023-04-20 DIAGNOSIS — Z00.00 ROUTINE HEALTH MAINTENANCE: ICD-10-CM

## 2023-04-20 DIAGNOSIS — E66.01 CLASS 2 SEVERE OBESITY DUE TO EXCESS CALORIES WITH SERIOUS COMORBIDITY AND BODY MASS INDEX (BMI) OF 37.0 TO 37.9 IN ADULT: Chronic | ICD-10-CM

## 2023-04-20 DIAGNOSIS — Z01.810 PREOP CARDIOVASCULAR EXAM: ICD-10-CM

## 2023-04-20 DIAGNOSIS — I48.0 PAROXYSMAL ATRIAL FIBRILLATION: Primary | Chronic | ICD-10-CM

## 2023-04-20 PROCEDURE — 3078F DIAST BP <80 MM HG: CPT | Mod: CPTII,S$GLB,, | Performed by: INTERNAL MEDICINE

## 2023-04-20 PROCEDURE — 3044F HG A1C LEVEL LT 7.0%: CPT | Mod: CPTII,S$GLB,, | Performed by: INTERNAL MEDICINE

## 2023-04-20 PROCEDURE — 1126F AMNT PAIN NOTED NONE PRSNT: CPT | Mod: CPTII,S$GLB,, | Performed by: INTERNAL MEDICINE

## 2023-04-20 PROCEDURE — 93010 EKG 12-LEAD: ICD-10-PCS | Mod: ,,, | Performed by: INTERNAL MEDICINE

## 2023-04-20 PROCEDURE — 3288F FALL RISK ASSESSMENT DOCD: CPT | Mod: CPTII,S$GLB,, | Performed by: INTERNAL MEDICINE

## 2023-04-20 PROCEDURE — 99999 PR PBB SHADOW E&M-EST. PATIENT-LVL IV: CPT | Mod: PBBFAC,,, | Performed by: INTERNAL MEDICINE

## 2023-04-20 PROCEDURE — 1101F PT FALLS ASSESS-DOCD LE1/YR: CPT | Mod: CPTII,S$GLB,, | Performed by: INTERNAL MEDICINE

## 2023-04-20 PROCEDURE — 4010F PR ACE/ARB THEARPY RXD/TAKEN: ICD-10-PCS | Mod: CPTII,S$GLB,, | Performed by: INTERNAL MEDICINE

## 2023-04-20 PROCEDURE — 3072F PR LOW RISK FOR RETINOPATHY: ICD-10-PCS | Mod: CPTII,S$GLB,, | Performed by: INTERNAL MEDICINE

## 2023-04-20 PROCEDURE — 1101F PR PT FALLS ASSESS DOC 0-1 FALLS W/OUT INJ PAST YR: ICD-10-PCS | Mod: CPTII,S$GLB,, | Performed by: INTERNAL MEDICINE

## 2023-04-20 PROCEDURE — 3008F BODY MASS INDEX DOCD: CPT | Mod: CPTII,S$GLB,, | Performed by: INTERNAL MEDICINE

## 2023-04-20 PROCEDURE — 1126F PR PAIN SEVERITY QUANTIFIED, NO PAIN PRESENT: ICD-10-PCS | Mod: CPTII,S$GLB,, | Performed by: INTERNAL MEDICINE

## 2023-04-20 PROCEDURE — 4010F ACE/ARB THERAPY RXD/TAKEN: CPT | Mod: CPTII,S$GLB,, | Performed by: INTERNAL MEDICINE

## 2023-04-20 PROCEDURE — 3078F PR MOST RECENT DIASTOLIC BLOOD PRESSURE < 80 MM HG: ICD-10-PCS | Mod: CPTII,S$GLB,, | Performed by: INTERNAL MEDICINE

## 2023-04-20 PROCEDURE — 99999 PR PBB SHADOW E&M-EST. PATIENT-LVL IV: ICD-10-PCS | Mod: PBBFAC,,, | Performed by: INTERNAL MEDICINE

## 2023-04-20 PROCEDURE — 1160F RVW MEDS BY RX/DR IN RCRD: CPT | Mod: CPTII,S$GLB,, | Performed by: INTERNAL MEDICINE

## 2023-04-20 PROCEDURE — 93010 ELECTROCARDIOGRAM REPORT: CPT | Mod: ,,, | Performed by: INTERNAL MEDICINE

## 2023-04-20 PROCEDURE — 93005 ELECTROCARDIOGRAM TRACING: CPT

## 2023-04-20 PROCEDURE — 3074F PR MOST RECENT SYSTOLIC BLOOD PRESSURE < 130 MM HG: ICD-10-PCS | Mod: CPTII,S$GLB,, | Performed by: INTERNAL MEDICINE

## 2023-04-20 PROCEDURE — 1159F MED LIST DOCD IN RCRD: CPT | Mod: CPTII,S$GLB,, | Performed by: INTERNAL MEDICINE

## 2023-04-20 PROCEDURE — 3074F SYST BP LT 130 MM HG: CPT | Mod: CPTII,S$GLB,, | Performed by: INTERNAL MEDICINE

## 2023-04-20 PROCEDURE — 1160F PR REVIEW ALL MEDS BY PRESCRIBER/CLIN PHARMACIST DOCUMENTED: ICD-10-PCS | Mod: CPTII,S$GLB,, | Performed by: INTERNAL MEDICINE

## 2023-04-20 PROCEDURE — 99215 PR OFFICE/OUTPT VISIT, EST, LEVL V, 40-54 MIN: ICD-10-PCS | Mod: S$GLB,,, | Performed by: INTERNAL MEDICINE

## 2023-04-20 PROCEDURE — 3072F LOW RISK FOR RETINOPATHY: CPT | Mod: CPTII,S$GLB,, | Performed by: INTERNAL MEDICINE

## 2023-04-20 PROCEDURE — 3288F PR FALLS RISK ASSESSMENT DOCUMENTED: ICD-10-PCS | Mod: CPTII,S$GLB,, | Performed by: INTERNAL MEDICINE

## 2023-04-20 PROCEDURE — 1159F PR MEDICATION LIST DOCUMENTED IN MEDICAL RECORD: ICD-10-PCS | Mod: CPTII,S$GLB,, | Performed by: INTERNAL MEDICINE

## 2023-04-20 PROCEDURE — 3044F PR MOST RECENT HEMOGLOBIN A1C LEVEL <7.0%: ICD-10-PCS | Mod: CPTII,S$GLB,, | Performed by: INTERNAL MEDICINE

## 2023-04-20 PROCEDURE — 99215 OFFICE O/P EST HI 40 MIN: CPT | Mod: S$GLB,,, | Performed by: INTERNAL MEDICINE

## 2023-04-20 PROCEDURE — 3008F PR BODY MASS INDEX (BMI) DOCUMENTED: ICD-10-PCS | Mod: CPTII,S$GLB,, | Performed by: INTERNAL MEDICINE

## 2023-04-20 RX ORDER — AMLODIPINE BESYLATE 10 MG/1
10 TABLET ORAL DAILY
Qty: 90 TABLET | Refills: 3 | Status: SHIPPED | OUTPATIENT
Start: 2023-04-20

## 2023-04-20 NOTE — PROGRESS NOTES
Subjective:   Patient ID:  Lacey Calderón is a 68 y.o. female who presents for follow up of No chief complaint on file.      67 yo female, came in for 6 months f/u  PMH PAF HTn HLD DM Dx in 2022,  Ekg NSR   echo at [facility] normal EF and LA size  BP fluctuated and med compliance  Added Valsartan and chlorthalidone one week ago. Dizziness improved.   Had the dental work    Interval history  On PT for the knee and leg. Plan to start the PT with stress maneuver  No chest pain dyspnea dizziness faint   BP LDL and A1C controlled   Ekg today NSR chronic STT change on inferolateral leads            Past Medical History:   Diagnosis Date    Arthritis     Cardiac arrhythmia 11/13/2020    Cataract     Class 2 severe obesity due to excess calories with serious comorbidity and body mass index (BMI) of 37.0 to 37.9 in adult 10/19/2020    Essential hypertension 10/19/2020    Hypertension     Paroxysmal atrial fibrillation 11/13/2020    Primary localized osteoarthritis of knees, bilateral 10/19/2020    Pure hypercholesterolemia 10/24/2020    Pure hypercholesterolemia 10/24/2020    Type 2 diabetes mellitus with hyperglycemia, with long-term current use of insulin 6/3/2021       Past Surgical History:   Procedure Laterality Date    MULTIPLE TOOTH EXTRACTIONS         Social History     Tobacco Use    Smoking status: Never    Smokeless tobacco: Never   Substance Use Topics    Alcohol use: Never    Drug use: Never       Family History   Problem Relation Age of Onset    Arthritis Mother     Heart disease Mother     Miscarriages / Stillbirths Mother     Dementia Mother     Glaucoma Mother     Cataracts Mother     No Known Problems Father          Review of Systems   Constitutional: Negative for decreased appetite, diaphoresis, fever, malaise/fatigue and night sweats.   HENT:  Negative for nosebleeds.    Eyes:  Negative for blurred vision and double vision.   Cardiovascular:  Negative for chest pain, claudication, dyspnea on exertion,  irregular heartbeat, leg swelling, near-syncope, orthopnea, palpitations, paroxysmal nocturnal dyspnea and syncope.   Respiratory:  Negative for cough, shortness of breath, sleep disturbances due to breathing, snoring, sputum production and wheezing.    Endocrine: Negative for cold intolerance and polyuria.   Hematologic/Lymphatic: Does not bruise/bleed easily.   Skin:  Negative for rash.   Musculoskeletal:  Positive for arthritis, back pain and joint pain. Negative for falls, joint swelling and neck pain.   Gastrointestinal:  Negative for abdominal pain, heartburn, nausea and vomiting.   Genitourinary:  Negative for dysuria, frequency and hematuria.   Neurological:  Negative for difficulty with concentration, dizziness, focal weakness, headaches, light-headedness, numbness, seizures and weakness.   Psychiatric/Behavioral:  Negative for depression, memory loss and substance abuse. The patient does not have insomnia.    Allergic/Immunologic: Negative for HIV exposure and hives.     Objective:   Physical Exam  HENT:      Head: Normocephalic.   Eyes:      Pupils: Pupils are equal, round, and reactive to light.   Neck:      Thyroid: No thyromegaly.      Vascular: Normal carotid pulses. No carotid bruit or JVD.   Cardiovascular:      Rate and Rhythm: Normal rate and regular rhythm. No extrasystoles are present.     Chest Wall: PMI is not displaced.      Pulses: Normal pulses.      Heart sounds: Normal heart sounds. No murmur heard.    No gallop. No S3 sounds.   Pulmonary:      Effort: No respiratory distress.      Breath sounds: Normal breath sounds. No stridor.   Abdominal:      General: Bowel sounds are normal.      Palpations: Abdomen is soft.      Tenderness: There is no abdominal tenderness. There is no rebound.   Musculoskeletal:         General: Normal range of motion.   Skin:     Findings: No rash.   Neurological:      Mental Status: She is alert and oriented to person, place, and time.   Psychiatric:          Behavior: Behavior normal.       Lab Results   Component Value Date    CHOL 169 08/18/2022    CHOL 196 01/31/2022    CHOL 255 (H) 10/19/2020     Lab Results   Component Value Date    HDL 60 08/18/2022    HDL 49 01/31/2022    HDL 66 10/19/2020     Lab Results   Component Value Date    LDLCALC 93.6 08/18/2022    LDLCALC 126.8 01/31/2022    LDLCALC 169.2 (H) 10/19/2020     Lab Results   Component Value Date    TRIG 77 08/18/2022    TRIG 101 01/31/2022    TRIG 99 10/19/2020     Lab Results   Component Value Date    CHOLHDL 35.5 08/18/2022    CHOLHDL 25.0 01/31/2022    CHOLHDL 25.9 10/19/2020       Chemistry        Component Value Date/Time     02/09/2023 0750    K 3.4 (L) 02/09/2023 0750     02/09/2023 0750    CO2 29 02/09/2023 0750    BUN 16 02/09/2023 0750    CREATININE 0.8 02/09/2023 0750    GLU 85 02/09/2023 0750        Component Value Date/Time    CALCIUM 9.9 02/09/2023 0750    ALKPHOS 73 02/09/2023 0750    AST 16 02/09/2023 0750    ALT 18 02/09/2023 0750    BILITOT 0.4 02/09/2023 0750    ESTGFRAFRICA >60.0 01/31/2022 1039    EGFRNONAA >60.0 01/31/2022 1039          Lab Results   Component Value Date    HGBA1C 6.1 (H) 02/09/2023     Lab Results   Component Value Date    TSH 2.253 10/19/2020     No results found for: INR, PROTIME  Lab Results   Component Value Date    WBC 5.67 02/09/2023    HGB 12.5 02/09/2023    HCT 39.3 02/09/2023    MCV 86 02/09/2023     02/09/2023     BMP  Sodium   Date Value Ref Range Status   02/09/2023 142 136 - 145 mmol/L Final     Potassium   Date Value Ref Range Status   02/09/2023 3.4 (L) 3.5 - 5.1 mmol/L Final     Chloride   Date Value Ref Range Status   02/09/2023 104 95 - 110 mmol/L Final     CO2   Date Value Ref Range Status   02/09/2023 29 23 - 29 mmol/L Final     BUN   Date Value Ref Range Status   02/09/2023 16 8 - 23 mg/dL Final     Creatinine   Date Value Ref Range Status   02/09/2023 0.8 0.5 - 1.4 mg/dL Final     Calcium   Date Value Ref Range Status    02/09/2023 9.9 8.7 - 10.5 mg/dL Final     Anion Gap   Date Value Ref Range Status   02/09/2023 9 8 - 16 mmol/L Final     eGFR if    Date Value Ref Range Status   01/31/2022 >60.0 >60 mL/min/1.73 m^2 Final     eGFR if non    Date Value Ref Range Status   01/31/2022 >60.0 >60 mL/min/1.73 m^2 Final     Comment:     Calculation used to obtain the estimated glomerular filtration  rate (eGFR) is the CKD-EPI equation.        BNP  @LABRCNTIP(BNP,BNPTRIAGEBLO)@  @LABRCNTIP(troponini)@  CrCl cannot be calculated (Patient's most recent lab result is older than the maximum 7 days allowed.).  No results found in the last 24 hours.  No results found in the last 24 hours.  No results found in the last 24 hours.    Assessment:      1. Paroxysmal atrial fibrillation    2. Pure hypercholesterolemia    3. Essential hypertension    4. Class 2 severe obesity due to excess calories with serious comorbidity and body mass index (BMI) of 37.0 to 37.9 in adult        Plan:   Phar MPI and ECHO for abnl EKG and pre clearance of advanced PT at [facility] Fax     Pt needs to continue eliquis 5 mg bid due to AFIB. Avoid any invasive procedure during the PT  Continue coreg 6.25 mg bid  Continue amlodipine vlasartan and Chlorthalidone  DM Rx per PCP  Counseled DASH  Check Lipid profile with PCP in 6 months  Recommend heart-healthy diet, weight control and regular exercise.  Bladimir. Risk modification.   I have reviewed all pertinent labs and cardiac studies independently. Plans and recommendations have been formulated under my direct supervision. All questions answered and patient voiced understanding.   If symptoms persist go to the ED  RTC in 6 months

## 2023-04-21 ENCOUNTER — TELEPHONE (OUTPATIENT)
Dept: CARDIOLOGY | Facility: HOSPITAL | Age: 68
End: 2023-04-21
Payer: MEDICARE

## 2023-04-24 ENCOUNTER — HOSPITAL ENCOUNTER (OUTPATIENT)
Dept: CARDIOLOGY | Facility: HOSPITAL | Age: 68
Discharge: HOME | End: 2023-04-24
Attending: INTERNAL MEDICINE
Payer: MEDICARE

## 2023-04-24 VITALS
HEIGHT: 66 IN | WEIGHT: 214 LBS | SYSTOLIC BLOOD PRESSURE: 126 MMHG | BODY MASS INDEX: 34.39 KG/M2 | DIASTOLIC BLOOD PRESSURE: 72 MMHG

## 2023-04-24 DIAGNOSIS — Z01.810 PREOP CARDIOVASCULAR EXAM: ICD-10-CM

## 2023-04-24 DIAGNOSIS — R94.31 EKG ABNORMALITIES: ICD-10-CM

## 2023-04-24 LAB
AORTIC ROOT ANNULUS: 2.85 CM
AV INDEX (PROSTH): 0.83
AV MEAN GRADIENT: 5 MMHG
AV PEAK GRADIENT: 11 MMHG
AV VALVE AREA: 2.67 CM2
AV VELOCITY RATIO: 0.74
BSA FOR ECHO PROCEDURE: 2.13 M2
CV ECHO LV RWT: 0.47 CM
DOP CALC AO PEAK VEL: 1.66 M/S
DOP CALC AO VTI: 32.3 CM
DOP CALC LVOT AREA: 3.2 CM2
DOP CALC LVOT DIAMETER: 2.03 CM
DOP CALC LVOT PEAK VEL: 1.23 M/S
DOP CALC LVOT STROKE VOLUME: 86.37 CM3
DOP CALC RVOT PEAK VEL: 0.73 M/S
DOP CALC RVOT VTI: 13.8 CM
DOP CALCLVOT PEAK VEL VTI: 26.7 CM
E WAVE DECELERATION TIME: 335.41 MSEC
E/A RATIO: 0.79
E/E' RATIO: 6.94 M/S
ECHO LV POSTERIOR WALL: 1.03 CM (ref 0.6–1.1)
EJECTION FRACTION: 65 %
FRACTIONAL SHORTENING: 38 % (ref 28–44)
INTERVENTRICULAR SEPTUM: 1.12 CM (ref 0.6–1.1)
IVRT: 99.9 MSEC
LA MAJOR: 5.57 CM
LA MINOR: 5.19 CM
LA WIDTH: 3.2 CM
LEFT ATRIUM SIZE: 4 CM
LEFT ATRIUM VOLUME INDEX MOD: 23.3 ML/M2
LEFT ATRIUM VOLUME INDEX: 28.4 ML/M2
LEFT ATRIUM VOLUME MOD: 48.06 CM3
LEFT ATRIUM VOLUME: 58.46 CM3
LEFT INTERNAL DIMENSION IN SYSTOLE: 2.69 CM (ref 2.1–4)
LEFT VENTRICLE DIASTOLIC VOLUME INDEX: 41.92 ML/M2
LEFT VENTRICLE DIASTOLIC VOLUME: 86.36 ML
LEFT VENTRICLE MASS INDEX: 79 G/M2
LEFT VENTRICLE SYSTOLIC VOLUME INDEX: 13 ML/M2
LEFT VENTRICLE SYSTOLIC VOLUME: 26.87 ML
LEFT VENTRICULAR INTERNAL DIMENSION IN DIASTOLE: 4.37 CM (ref 3.5–6)
LEFT VENTRICULAR MASS: 161.77 G
LV LATERAL E/E' RATIO: 5.9 M/S
LV SEPTAL E/E' RATIO: 8.43 M/S
LVOT MG: 2.81 MMHG
LVOT MV: 0.76 CM/S
MV PEAK A VEL: 0.75 M/S
MV PEAK E VEL: 0.59 M/S
PISA TR MAX VEL: 2.72 M/S
PV MEAN GRADIENT: 1.43 MMHG
PV MV: 0.71 M/S
PV PEAK VELOCITY: 1.09 CM/S
RA MAJOR: 4.94 CM
RA PRESSURE: 8 MMHG
RA WIDTH: 2.55 CM
RIGHT VENTRICULAR END-DIASTOLIC DIMENSION: 2.15 CM
SINUS: 2.24 CM
STJ: 2.25 CM
TDI LATERAL: 0.1 M/S
TDI SEPTAL: 0.07 M/S
TDI: 0.09 M/S
TR MAX PG: 30 MMHG
TV REST PULMONARY ARTERY PRESSURE: 38 MMHG

## 2023-04-24 PROCEDURE — 93306 TTE W/DOPPLER COMPLETE: CPT

## 2023-04-24 PROCEDURE — 93306 TTE W/DOPPLER COMPLETE: CPT | Mod: 26,,, | Performed by: INTERNAL MEDICINE

## 2023-04-24 PROCEDURE — 93306 ECHO (CUPID ONLY): ICD-10-PCS | Mod: 26,,, | Performed by: INTERNAL MEDICINE

## 2023-04-26 ENCOUNTER — TELEPHONE (OUTPATIENT)
Dept: CARDIOLOGY | Facility: CLINIC | Age: 68
End: 2023-04-26
Payer: MEDICARE

## 2023-04-26 NOTE — TELEPHONE ENCOUNTER
Unable to leave pt  due to phone being off.         ----- Message from David Whitaker MD sent at 4/26/2023  8:17 AM CDT -----  Echo showed normal function and mild valvular leaking    Continue current Rx  F/U as scheduled

## 2023-05-26 ENCOUNTER — TELEPHONE (OUTPATIENT)
Dept: INTERNAL MEDICINE | Facility: CLINIC | Age: 68
End: 2023-05-26
Payer: MEDICARE

## 2023-05-26 DIAGNOSIS — Z79.4 TYPE 2 DIABETES MELLITUS WITH OTHER SPECIFIED COMPLICATION, WITH LONG-TERM CURRENT USE OF INSULIN: Primary | Chronic | ICD-10-CM

## 2023-05-26 DIAGNOSIS — I15.2 HYPERTENSION COMPLICATING DIABETES: Chronic | ICD-10-CM

## 2023-05-26 DIAGNOSIS — E11.69 HYPERLIPIDEMIA ASSOCIATED WITH TYPE 2 DIABETES MELLITUS: Chronic | ICD-10-CM

## 2023-05-26 DIAGNOSIS — E78.5 HYPERLIPIDEMIA ASSOCIATED WITH TYPE 2 DIABETES MELLITUS: Chronic | ICD-10-CM

## 2023-05-26 DIAGNOSIS — E11.69 TYPE 2 DIABETES MELLITUS WITH OTHER SPECIFIED COMPLICATION, WITH LONG-TERM CURRENT USE OF INSULIN: Primary | Chronic | ICD-10-CM

## 2023-05-26 DIAGNOSIS — E11.59 HYPERTENSION COMPLICATING DIABETES: Chronic | ICD-10-CM

## 2023-05-26 NOTE — TELEPHONE ENCOUNTER
----- Message from Dalia Orozco sent at 5/26/2023  8:52 AM CDT -----  Contact: [facility] is requesting an order for the pt diabetic supplies to be faxed over. (Fax# [phone]) You can reach her dept @ [phone].

## 2023-05-30 ENCOUNTER — TELEPHONE (OUTPATIENT)
Dept: INTERNAL MEDICINE | Facility: CLINIC | Age: 68
End: 2023-05-30
Payer: MEDICARE

## 2023-05-30 NOTE — TELEPHONE ENCOUNTER
----- Message from Nalini Mitchell sent at 5/30/2023  9:03 AM CDT -----  Contact: [employer]\Kamron De La Rosa called to request an order for diabetic testing supplies for the pt, strips, lancets and a glucose monitor. Please call her back at [phone].    Thanks  TS

## 2023-05-30 NOTE — TELEPHONE ENCOUNTER
----- Message from Nalini Mitchell sent at 5/30/2023  9:03 AM CDT -----  Contact: [employer]\Kamron De La Rosa called to request an order for diabetic testing supplies for the pt, strips, lancets and a glucose monitor. Please call her back at [phone].    Thanks  TS

## 2023-06-02 PROBLEM — I15.2 HYPERTENSION COMPLICATING DIABETES: Chronic | Status: ACTIVE | Noted: 2020-10-19

## 2023-06-02 PROBLEM — E11.9 HYPERTENSION COMPLICATING DIABETES: Chronic | Status: ACTIVE | Noted: 2020-10-19

## 2023-06-02 PROBLEM — E11.69 HYPERLIPIDEMIA ASSOCIATED WITH TYPE 2 DIABETES MELLITUS: Chronic | Status: ACTIVE | Noted: 2020-10-24

## 2023-06-02 PROBLEM — E11.59 HYPERTENSION COMPLICATING DIABETES: Chronic | Status: ACTIVE | Noted: 2020-10-19

## 2023-06-02 PROBLEM — E11.69 TYPE 2 DIABETES MELLITUS WITH OTHER SPECIFIED COMPLICATION: Chronic | Status: ACTIVE | Noted: 2021-06-03

## 2023-06-02 PROBLEM — E78.5 HYPERLIPIDEMIA ASSOCIATED WITH TYPE 2 DIABETES MELLITUS: Chronic | Status: ACTIVE | Noted: 2020-10-24

## 2023-06-02 NOTE — TELEPHONE ENCOUNTER
LAST APPOINTMENT WITH ME:  10/17/2022   NEXT APPOINTMENT WITH ME: None    She is overdue for follow up appointment with me. She has no future appointments scheduled with me.  She appears delinquent for colon cancer screening.    TO MY TEAM:   Schedule her for labs listed below to be done anytime soon after July 1, 2023.  Schedule her for soonest available in office appointment with me at least a couple of days after labs completed.  Ask her if she wants to do colon cancer screening via colonoscopy or Cologuard, and enter order by written order guidelines.  After she has scheduled her labs and appointment with me, resubmit request for me to order her diabetic supplies from [employer].  Thanks    Orders Placed This Encounter   Procedures    Hemoglobin A1C    Comprehensive Metabolic Panel    Lipid Panel    Microalbumin/Creatinine Ratio, Urine     Health Maintenance Due   Topic Date Due    Colorectal Cancer Screening  Never done    COVID-19 Vaccine (6 - Moderna series) 02/27/2023    Foot Exam  07/22/2023

## 2023-06-15 ENCOUNTER — TELEPHONE (OUTPATIENT)
Dept: INTERNAL MEDICINE | Facility: CLINIC | Age: 68
End: 2023-06-15
Payer: MEDICARE

## 2023-06-15 NOTE — TELEPHONE ENCOUNTER
----- Message from Nathalia Duque sent at 6/15/2023  4:39 PM CDT -----  Contact: pt  Pt is requesting to have a PA for the diabetic monitor and needs that to be sent to her ins for approval to [employer]. ([employer])  If any questions, please call her back at [phone] thank/mps

## 2023-06-21 ENCOUNTER — TELEPHONE (OUTPATIENT)
Dept: INTERNAL MEDICINE | Facility: CLINIC | Age: 68
End: 2023-06-21
Payer: MEDICARE

## 2023-06-21 NOTE — TELEPHONE ENCOUNTER
----- Message from Gwendolyn Hernandez sent at 6/20/2023  4:11 PM CDT -----  Contact: [employer]  [employer] is calling to speak with the nurse regarding a replacement of the Glucose meter for patient. Please give a call back at [phone] and fax # [phone] as requested.  Thanks  LR

## 2023-06-22 ENCOUNTER — TELEPHONE (OUTPATIENT)
Dept: INTERNAL MEDICINE | Facility: CLINIC | Age: 68
End: 2023-06-22
Payer: MEDICARE

## 2023-06-22 NOTE — TELEPHONE ENCOUNTER
----- Message from Gwendolyn Hernandez sent at 6/22/2023  8:40 AM CDT -----  Contact: [employer]  [employer] is calling requesting orders for Diabetic meter for patient. Please give a call back at [phone] and fax # [phone] as requested.  Thanks  LR

## 2023-06-23 DIAGNOSIS — E11.65 TYPE 2 DIABETES MELLITUS WITH HYPERGLYCEMIA, WITH LONG-TERM CURRENT USE OF INSULIN: Chronic | ICD-10-CM

## 2023-06-23 DIAGNOSIS — Z79.4 TYPE 2 DIABETES MELLITUS WITH HYPERGLYCEMIA, WITH LONG-TERM CURRENT USE OF INSULIN: Chronic | ICD-10-CM

## 2023-06-23 NOTE — TELEPHONE ENCOUNTER
Refill Routing Note   Medication(s) are not appropriate for processing by [facility] for the following reason(s):      Due for refill >6 months ago    [facility] action(s):  Defer None identified            Appointments  past 12m or future 3m with PCP    Date Provider   Last Visit   10/17/2022 SURINDER Merrill MD   Next Visit   Visit date not found SURINDER Merrill MD   ED visits in past 90 days: 0        Note composed:12:20 PM 06/23/2023

## 2023-06-23 NOTE — TELEPHONE ENCOUNTER
No care due was identified.  [facility] Embedded Care Due Messages. Reference number: [id number].   6/23/2023 8:40:21 AM CDT

## 2023-06-28 ENCOUNTER — TELEPHONE (OUTPATIENT)
Dept: ADMINISTRATIVE | Facility: HOSPITAL | Age: 68
End: 2023-06-28
Payer: MEDICARE

## 2023-06-28 DIAGNOSIS — Z79.4 TYPE 2 DIABETES MELLITUS WITH OTHER SPECIFIED COMPLICATION, WITH LONG-TERM CURRENT USE OF INSULIN: Primary | Chronic | ICD-10-CM

## 2023-06-28 DIAGNOSIS — E11.69 TYPE 2 DIABETES MELLITUS WITH OTHER SPECIFIED COMPLICATION, WITH LONG-TERM CURRENT USE OF INSULIN: Primary | Chronic | ICD-10-CM

## 2023-06-28 NOTE — LETTER
FAX    DATE: 2023  TO: [facility] (Medical Necessity Determination)  FAX: [phone]  FROM: SURINDER Merrill MD  RE: Lacey Calderón  1955   Our MRN [id number]      Please see the following:    ? Signed Physician Order for diabetes testing supplies    ? Medical Necessity Form      CONFIDENTIALITY NOTICE: The accompanying facsimile is intended solely for the use of the recipient designated above. Document(s) transmitted herewith may contain information that is confidential and privileged. Delivery, distribution or dissemination of this communication other than to the intended recipient is strictly prohibited. If you have received this facsimile in error, please notify [facility]'s Compliance and Privacy Department immediately by telephone at [phone]. Thank you.              PHYSICIAN ORDERS    PATIENT IDENTIFYING INFORMATION:  Lacey Calderón  [address] [address] YOB: 1955  OUR MRN: [id number]      ORDER DATE: 2023    ORDERS  Glucometer kit. SIG: Use as instructed. DISP: 1 each. REFILL: 0. NOTES: Generic/brand therapeutic substitution OK. Use [employer] Metrix or insurance-preferred brand unless patient requests otherwise. Dispense supplies corresponding with patient's glucometer.      SUPPORTING DIAGNOSES    ICD-10-CM ICD-9-CM   1. Type 2 diabetes mellitus with other specified complication, with long-term current use of insulin  E11.69  Z79.4 250.80  V58.67          Electronically Signed 2023 5:06 PM  JAKE Merrill MD, NPI: [id number]

## 2023-06-28 NOTE — TELEPHONE ENCOUNTER
Hi, I received an email from [employer] stating the patient has been trying to obtain an order for a new diabetic meter due to hers no longer working. Patient is upset, I do see where the office has tried to reach out to the patient. A new machine cannot be approved without an order from PCP. Please try to contact the patient again to discuss. Thank you.

## 2023-06-28 NOTE — TELEPHONE ENCOUNTER
Attempted to contact patient about blood sugar meter she is requesting from Dr. Merrill. The only telephone number listed in her chart is a non-functioning number.

## 2023-06-29 ENCOUNTER — TELEPHONE (OUTPATIENT)
Dept: INTERNAL MEDICINE | Facility: CLINIC | Age: 68
End: 2023-06-29
Payer: MEDICARE

## 2023-06-29 RX ORDER — INSULIN PUMP SYRINGE, 3 ML
EACH MISCELLANEOUS
Qty: 1 EACH | Refills: 0 | OUTPATIENT
Start: 2023-06-29

## 2023-06-29 NOTE — TELEPHONE ENCOUNTER
----- Message from Jen Wilkins sent at 6/29/2023  8:48 AM CDT -----  Who Called: Patient     Who Left Message for Patient:  geetha    Does the patient know what this is regarding?: monitor kit    Best Call Back Number: [phone]        Additional Information

## 2023-06-29 NOTE — TELEPHONE ENCOUNTER
----- Message from Nathalia Duque sent at 6/29/2023  9:01 AM CDT -----  Contact: pt  Type:  Patient Returning Call    Who Called: pt  Who Left Message for Patient: nurse  Does the patient know what this is regarding?: not sure  Would the patient rather a call back or a response via [facility]? phone  Best Call Back Number: [phone]  Additional Information:

## 2023-06-29 NOTE — TELEPHONE ENCOUNTER
Called and left a voicemail on both listed patient telephone numbers for patient to return my call.

## 2023-06-29 NOTE — TELEPHONE ENCOUNTER
Spoke with patient and informed her that I sent the request for a new blood glucose meter and supplies to Dr. Merrill yesterday and we are just waiting for him to order it so we can send the order to [employer] for an approval. She verbalized understanding.

## 2023-06-30 NOTE — TELEPHONE ENCOUNTER
This cannot be handled as a refill request. It requires a printed order to be faxed. Refer to original message.    ----- Message from Natalie Gilbert sent at 6/23/2023 8:28 AM CDT -----  Contact: [employer]-[phone]  [employer] is requesting an order for a diabetic meter. Patient stated the previous meter is lost or broken and needs a replacement. Please fax to [phone]. Please call them back at [phone]. Thanks    Additionally, she is overdue for diabetes labs and she has no follow-up appointment with me scheduled.    TO MY TEAM:   Please order the following by written order guidelines and help patient schedule ASAP.  A1c [LAB90]  CMP [LAB17]  Lipid Panel [LAB18]  Microalbumin/creatinine urine [PNG503]  Schedule her for next available appointment with me after she gets her labs done.  Re-route message about faxing glucometer order to me as Patient Call message.    Thanks.

## 2023-06-30 NOTE — TELEPHONE ENCOUNTER
"TO MY TEAM:   I entered the requested glucometer order and routed it to you separately as a "Letter."  Please print that communication routed to you separately, complete the [facility] Form, and fax to [employer].  Important: Please re-read 5/26/23 Telephone Encounter and 6/23/23 Refill Encounter (included below) and schedule her labs and appointment.    Telephone Encounter: 5/26/2023                                      LAST APPOINTMENT WITH ME:  10/17/2022                                                NEXT APPOINTMENT WITH ME: None     She is overdue for follow up appointment with me. She has no future appointments scheduled with me.  She appears delinquent for colon cancer screening.     TO MY TEAM:   Schedule her for labs listed below to be done anytime soon after July 1, 2023.  Schedule her for soonest available in office appointment with me at least a couple of days after labs completed.  Ask her if she wants to do colon cancer screening via colonoscopy or Cologuard, and enter order by written order guidelines.  After she has scheduled her labs and appointment with me, resubmit request for me to order her diabetic supplies from [employer].  Thanks         Orders Placed This Encounter   Procedures    Hemoglobin A1C    Comprehensive Metabolic Panel    Lipid Panel    Microalbumin/Creatinine Ratio, Urine           Refill Encounter:  6/23/2023  This cannot be handled as a refill request. It requires a printed order to be faxed. Refer to original message.     ----- Message from Natalie Gilbert sent at 6/23/2023 8:28 AM CDT -----  Contact: [employer]-[phone]  [facility] is requesting an order for a diabetic meter. Patient stated the previous meter is lost or broken and needs a replacement. Please fax to [phone]. Please call them back at [phone]. Thanks     Additionally, she is overdue for diabetes labs and she has no follow-up appointment with me scheduled.     TO " MY TEAM:   Please order the following by written order guidelines and help patient schedule ASAP.  A1c [LAB90]  CMP [LAB17]  Lipid Panel [LAB18]  Microalbumin/creatinine urine [[id number]]  Schedule her for next available appointment with me after she gets her labs done.  Re-route message about faxing glucometer order to me as Patient Call message.     Thanks.

## 2023-07-03 NOTE — TELEPHONE ENCOUNTER
Spoke with patient and informed her that I faxed the order for her diabetic meter and supplies to [employer] this morning. Also assisted her in scheduling her labs and follow up appointment per Dr. Merrill. Patient verbalized understanding.

## 2023-07-06 ENCOUNTER — LAB VISIT (OUTPATIENT)
Dept: LAB | Facility: HOSPITAL | Age: 68
End: 2023-07-06
Attending: FAMILY MEDICINE
Payer: MEDICARE

## 2023-07-06 DIAGNOSIS — Z79.4 TYPE 2 DIABETES MELLITUS WITH OTHER SPECIFIED COMPLICATION, WITH LONG-TERM CURRENT USE OF INSULIN: Chronic | ICD-10-CM

## 2023-07-06 DIAGNOSIS — E11.69 TYPE 2 DIABETES MELLITUS WITH OTHER SPECIFIED COMPLICATION, WITH LONG-TERM CURRENT USE OF INSULIN: Chronic | ICD-10-CM

## 2023-07-06 LAB
ALBUMIN/CREAT UR: 5.1 UG/MG (ref 0–30)
CREAT UR-MCNC: 98 MG/DL (ref 15–325)
MICROALBUMIN UR DL<=1MG/L-MCNC: 5 UG/ML

## 2023-07-06 PROCEDURE — 82570 ASSAY OF URINE CREATININE: CPT | Performed by: FAMILY MEDICINE

## 2023-07-07 ENCOUNTER — PATIENT MESSAGE (OUTPATIENT)
Dept: CARDIOLOGY | Facility: HOSPITAL | Age: 68
End: 2023-07-07
Payer: MEDICARE

## 2023-07-08 DIAGNOSIS — I10 ESSENTIAL HYPERTENSION: Chronic | ICD-10-CM

## 2023-07-08 NOTE — TELEPHONE ENCOUNTER
No care due was identified.  [facility] Embedded Care Due Messages. Reference number: [id number].   7/08/2023 11:00:44 AM CDT

## 2023-07-09 RX ORDER — CHLORTHALIDONE 25 MG/1
TABLET ORAL
Qty: 90 TABLET | Refills: 1 | Status: SHIPPED | OUTPATIENT
Start: 2023-07-09 | End: 2023-08-17 | Stop reason: SDUPTHER

## 2023-07-09 NOTE — TELEPHONE ENCOUNTER
Refill Decision Note   Lacey Calderón  is requesting a refill authorization.  Brief Assessment and Rationale for Refill:  Approve     Medication Therapy Plan:         Comments:     Note composed:2:14 AM 07/09/2023

## 2023-07-13 ENCOUNTER — TELEPHONE (OUTPATIENT)
Dept: CARDIOLOGY | Facility: HOSPITAL | Age: 68
End: 2023-07-13
Payer: MEDICARE

## 2023-08-17 ENCOUNTER — OFFICE VISIT (OUTPATIENT)
Dept: INTERNAL MEDICINE | Facility: CLINIC | Age: 68
End: 2023-08-17
Payer: MEDICARE

## 2023-08-17 VITALS
OXYGEN SATURATION: 98 % | SYSTOLIC BLOOD PRESSURE: 130 MMHG | DIASTOLIC BLOOD PRESSURE: 88 MMHG | TEMPERATURE: 97 F | WEIGHT: 214.75 LBS | RESPIRATION RATE: 19 BRPM | HEART RATE: 65 BPM | HEIGHT: 66 IN | BODY MASS INDEX: 34.51 KG/M2

## 2023-08-17 DIAGNOSIS — E11.9 DIABETIC EYE EXAM: ICD-10-CM

## 2023-08-17 DIAGNOSIS — Z12.31 BREAST CANCER SCREENING BY MAMMOGRAM: ICD-10-CM

## 2023-08-17 DIAGNOSIS — E11.59 HYPERTENSION COMPLICATING DIABETES: Chronic | ICD-10-CM

## 2023-08-17 DIAGNOSIS — E11.69 TYPE 2 DIABETES MELLITUS WITH OTHER SPECIFIED COMPLICATION, WITH LONG-TERM CURRENT USE OF INSULIN: Primary | Chronic | ICD-10-CM

## 2023-08-17 DIAGNOSIS — E78.5 HYPERLIPIDEMIA ASSOCIATED WITH TYPE 2 DIABETES MELLITUS: Chronic | ICD-10-CM

## 2023-08-17 DIAGNOSIS — Z79.4 TYPE 2 DIABETES MELLITUS WITH OTHER SPECIFIED COMPLICATION, WITH LONG-TERM CURRENT USE OF INSULIN: Primary | Chronic | ICD-10-CM

## 2023-08-17 DIAGNOSIS — E26.9 HYPERALDOSTERONISM: ICD-10-CM

## 2023-08-17 DIAGNOSIS — R80.9 TYPE 2 DIABETES MELLITUS WITH MICROALBUMINURIA, WITH LONG-TERM CURRENT USE OF INSULIN: Chronic | ICD-10-CM

## 2023-08-17 DIAGNOSIS — T50.2X5A DIURETIC-INDUCED HYPOKALEMIA: ICD-10-CM

## 2023-08-17 DIAGNOSIS — Z78.0 ASYMPTOMATIC MENOPAUSAL STATE: ICD-10-CM

## 2023-08-17 DIAGNOSIS — E11.69 HYPERLIPIDEMIA ASSOCIATED WITH TYPE 2 DIABETES MELLITUS: Chronic | ICD-10-CM

## 2023-08-17 DIAGNOSIS — E11.29 TYPE 2 DIABETES MELLITUS WITH MICROALBUMINURIA, WITH LONG-TERM CURRENT USE OF INSULIN: Chronic | ICD-10-CM

## 2023-08-17 DIAGNOSIS — Z01.00 DIABETIC EYE EXAM: ICD-10-CM

## 2023-08-17 DIAGNOSIS — E66.9 TYPE 2 DIABETES MELLITUS WITH OBESITY: Chronic | ICD-10-CM

## 2023-08-17 DIAGNOSIS — I15.2 HYPERTENSION COMPLICATING DIABETES: Chronic | ICD-10-CM

## 2023-08-17 DIAGNOSIS — E11.69 TYPE 2 DIABETES MELLITUS WITH OBESITY: Chronic | ICD-10-CM

## 2023-08-17 DIAGNOSIS — E87.6 DIURETIC-INDUCED HYPOKALEMIA: ICD-10-CM

## 2023-08-17 DIAGNOSIS — I48.0 PAROXYSMAL ATRIAL FIBRILLATION: Chronic | ICD-10-CM

## 2023-08-17 DIAGNOSIS — Z79.4 TYPE 2 DIABETES MELLITUS WITH MICROALBUMINURIA, WITH LONG-TERM CURRENT USE OF INSULIN: Chronic | ICD-10-CM

## 2023-08-17 DIAGNOSIS — I10 ESSENTIAL HYPERTENSION: Chronic | ICD-10-CM

## 2023-08-17 DIAGNOSIS — E78.00 PURE HYPERCHOLESTEROLEMIA: Chronic | ICD-10-CM

## 2023-08-17 PROCEDURE — 3008F PR BODY MASS INDEX (BMI) DOCUMENTED: ICD-10-PCS | Mod: CPTII,S$GLB,, | Performed by: FAMILY MEDICINE

## 2023-08-17 PROCEDURE — 3288F FALL RISK ASSESSMENT DOCD: CPT | Mod: CPTII,S$GLB,, | Performed by: FAMILY MEDICINE

## 2023-08-17 PROCEDURE — 3079F DIAST BP 80-89 MM HG: CPT | Mod: CPTII,S$GLB,, | Performed by: FAMILY MEDICINE

## 2023-08-17 PROCEDURE — 1100F PR PT FALLS ASSESS DOC 2+ FALLS/FALL W/INJURY/YR: ICD-10-PCS | Mod: CPTII,S$GLB,, | Performed by: FAMILY MEDICINE

## 2023-08-17 PROCEDURE — 3075F SYST BP GE 130 - 139MM HG: CPT | Mod: CPTII,S$GLB,, | Performed by: FAMILY MEDICINE

## 2023-08-17 PROCEDURE — 99999 PR PBB SHADOW E&M-EST. PATIENT-LVL V: ICD-10-PCS | Mod: PBBFAC,,, | Performed by: FAMILY MEDICINE

## 2023-08-17 PROCEDURE — 4010F ACE/ARB THERAPY RXD/TAKEN: CPT | Mod: CPTII,S$GLB,, | Performed by: FAMILY MEDICINE

## 2023-08-17 PROCEDURE — 3079F PR MOST RECENT DIASTOLIC BLOOD PRESSURE 80-89 MM HG: ICD-10-PCS | Mod: CPTII,S$GLB,, | Performed by: FAMILY MEDICINE

## 2023-08-17 PROCEDURE — 3044F HG A1C LEVEL LT 7.0%: CPT | Mod: CPTII,S$GLB,, | Performed by: FAMILY MEDICINE

## 2023-08-17 PROCEDURE — 3061F NEG MICROALBUMINURIA REV: CPT | Mod: CPTII,S$GLB,, | Performed by: FAMILY MEDICINE

## 2023-08-17 PROCEDURE — 3066F PR DOCUMENTATION OF TREATMENT FOR NEPHROPATHY: ICD-10-PCS | Mod: CPTII,S$GLB,, | Performed by: FAMILY MEDICINE

## 2023-08-17 PROCEDURE — 1159F MED LIST DOCD IN RCRD: CPT | Mod: CPTII,S$GLB,, | Performed by: FAMILY MEDICINE

## 2023-08-17 PROCEDURE — 3066F NEPHROPATHY DOC TX: CPT | Mod: CPTII,S$GLB,, | Performed by: FAMILY MEDICINE

## 2023-08-17 PROCEDURE — 3288F PR FALLS RISK ASSESSMENT DOCUMENTED: ICD-10-PCS | Mod: CPTII,S$GLB,, | Performed by: FAMILY MEDICINE

## 2023-08-17 PROCEDURE — 1125F PR PAIN SEVERITY QUANTIFIED, PAIN PRESENT: ICD-10-PCS | Mod: CPTII,S$GLB,, | Performed by: FAMILY MEDICINE

## 2023-08-17 PROCEDURE — 1125F AMNT PAIN NOTED PAIN PRSNT: CPT | Mod: CPTII,S$GLB,, | Performed by: FAMILY MEDICINE

## 2023-08-17 PROCEDURE — 1159F PR MEDICATION LIST DOCUMENTED IN MEDICAL RECORD: ICD-10-PCS | Mod: CPTII,S$GLB,, | Performed by: FAMILY MEDICINE

## 2023-08-17 PROCEDURE — 99214 PR OFFICE/OUTPT VISIT, EST, LEVL IV, 30-39 MIN: ICD-10-PCS | Mod: S$GLB,,, | Performed by: FAMILY MEDICINE

## 2023-08-17 PROCEDURE — 3075F PR MOST RECENT SYSTOLIC BLOOD PRESS GE 130-139MM HG: ICD-10-PCS | Mod: CPTII,S$GLB,, | Performed by: FAMILY MEDICINE

## 2023-08-17 PROCEDURE — 3008F BODY MASS INDEX DOCD: CPT | Mod: CPTII,S$GLB,, | Performed by: FAMILY MEDICINE

## 2023-08-17 PROCEDURE — 1100F PTFALLS ASSESS-DOCD GE2>/YR: CPT | Mod: CPTII,S$GLB,, | Performed by: FAMILY MEDICINE

## 2023-08-17 PROCEDURE — 3061F PR NEG MICROALBUMINURIA RESULT DOCUMENTED/REVIEW: ICD-10-PCS | Mod: CPTII,S$GLB,, | Performed by: FAMILY MEDICINE

## 2023-08-17 PROCEDURE — 99214 OFFICE O/P EST MOD 30 MIN: CPT | Mod: S$GLB,,, | Performed by: FAMILY MEDICINE

## 2023-08-17 PROCEDURE — 99999 PR PBB SHADOW E&M-EST. PATIENT-LVL V: CPT | Mod: PBBFAC,,, | Performed by: FAMILY MEDICINE

## 2023-08-17 PROCEDURE — 3044F PR MOST RECENT HEMOGLOBIN A1C LEVEL <7.0%: ICD-10-PCS | Mod: CPTII,S$GLB,, | Performed by: FAMILY MEDICINE

## 2023-08-17 PROCEDURE — 4010F PR ACE/ARB THEARPY RXD/TAKEN: ICD-10-PCS | Mod: CPTII,S$GLB,, | Performed by: FAMILY MEDICINE

## 2023-08-17 RX ORDER — CARVEDILOL 6.25 MG/1
6.25 TABLET ORAL 2 TIMES DAILY
Qty: 180 TABLET | Refills: 2 | Status: SHIPPED | OUTPATIENT
Start: 2023-08-17

## 2023-08-17 RX ORDER — INSULIN LISPRO 100 [IU]/ML
INJECTION, SOLUTION INTRAVENOUS; SUBCUTANEOUS
Qty: 15 ML | Refills: 5 | Status: SHIPPED | OUTPATIENT
Start: 2023-08-17

## 2023-08-17 RX ORDER — METFORMIN HYDROCHLORIDE 500 MG/1
500 TABLET, EXTENDED RELEASE ORAL DAILY
Qty: 90 TABLET | Refills: 3 | Status: SHIPPED | OUTPATIENT
Start: 2023-08-17

## 2023-08-17 RX ORDER — ROSUVASTATIN CALCIUM 20 MG/1
20 TABLET, COATED ORAL NIGHTLY
Qty: 90 TABLET | Refills: 3 | Status: SHIPPED | OUTPATIENT
Start: 2023-08-17 | End: 2024-08-16

## 2023-08-17 RX ORDER — VALSARTAN 320 MG/1
320 TABLET ORAL DAILY
Qty: 90 TABLET | Refills: 3 | Status: SHIPPED | OUTPATIENT
Start: 2023-08-17 | End: 2024-08-16

## 2023-08-17 RX ORDER — CHLORTHALIDONE 25 MG/1
25 TABLET ORAL DAILY
Qty: 90 TABLET | Refills: 2 | Status: SHIPPED | OUTPATIENT
Start: 2023-08-17

## 2023-08-17 RX ORDER — INSULIN GLARGINE 100 [IU]/ML
INJECTION, SOLUTION SUBCUTANEOUS
Qty: 30 ML | Refills: 2 | Status: SHIPPED | OUTPATIENT
Start: 2023-08-17

## 2023-08-17 RX ORDER — POTASSIUM CHLORIDE 750 MG/1
10 TABLET, EXTENDED RELEASE ORAL DAILY
Qty: 90 TABLET | Refills: 3 | Status: SHIPPED | OUTPATIENT
Start: 2023-08-17 | End: 2026-05-12

## 2023-08-17 RX ORDER — BIOTIN 5 MG
CAPSULE ORAL 2 TIMES DAILY
COMMUNITY

## 2023-08-17 NOTE — ASSESSMENT & PLAN NOTE
Lab Results   Component Value Date    ALDOSTERONE 15.7 10/17/2022    RENIN 0.2 10/17/2022    ALDRENINCALC 78.5 (H) 10/17/2022   Clinically stable.  PLAN: Monitor/treat BP, monitor labs.

## 2023-08-17 NOTE — ASSESSMENT & PLAN NOTE
"Wt Readings from Last 6 Encounters:   08/17/23 97.4 kg (214 lb 11.7 oz)   04/24/23 97.1 kg (214 lb)   04/20/23 97.2 kg (214 lb 4.6 oz)   11/10/22 97.7 kg (215 lb 6.2 oz)   10/20/22 101.6 kg (224 lb)   10/17/22 104.6 kg (230 lb 9.6 oz)     Estimated body mass index is 34.66 kg/m² as calculated from the following:    Height as of this encounter: 5' 6" (1.676 m).    Weight as of this encounter: 97.4 kg (214 lb 11.7 oz).    "

## 2023-08-17 NOTE — ASSESSMENT & PLAN NOTE
Lab Results   Component Value Date    CHOL 217 (H) 07/06/2023    CHOL 169 08/18/2022    TRIG 80 07/06/2023    TRIG 77 08/18/2022    HDL 63 07/06/2023    HDL 60 08/18/2022    LDLCALC 138.0 07/06/2023    LDLCALC 93.6 08/18/2022    NONHDLCHOL 154 07/06/2023    NONHDLCHOL 109 08/18/2022    AST 19 07/06/2023    ALT 12 07/06/2023     The 10-year ASCVD risk score (Sharmila CHENEY, et al., 2019) is: 26.2%    Values used to calculate the score:      Age: 68 years      Sex: Female      Is Non- : Yes      Diabetic: Yes      Tobacco smoker: No      Systolic Blood Pressure: 130 mmHg      Is BP treated: Yes      HDL Cholesterol: 63 mg/dL      Total Cholesterol: 217 mg/dL

## 2023-08-17 NOTE — ASSESSMENT & PLAN NOTE
BP Readings from Last 6 Encounters:   08/17/23 130/88   04/24/23 126/72   04/20/23 126/72   11/10/22 130/80   10/27/22 (!) 132/98   10/20/22 (!) 146/92     Lab Results   Component Value Date    EGFRNORACEVR >60.0 07/06/2023    CREATININE 0.8 07/06/2023    BUN 17 07/06/2023    K 4.0 07/06/2023     07/06/2023     07/06/2023     Results for orders placed or performed during the hospital encounter of 04/20/23   SCHEDULED EKG 12-LEAD (to Muse)    Collection Time: 04/20/23  8:00 AM    Narrative    Test Reason : I48.0,Z00.00,    Vent. Rate : 078 BPM     Atrial Rate : 078 BPM     P-R Int : 178 ms          QRS Dur : 086 ms      QT Int : 356 ms       P-R-T Axes : 094 051 -72 degrees     QTc Int : 405 ms    Normal sinus rhythm  ST and T wave abnormality, consider inferior ischemia  ST and T wave abnormality, consider anterolateral ischemia  Abnormal ECG  When compared with ECG of 20-OCT-2022 08:45,  Inverted T waves have replaced nonspecific T wave abnormality in Inferior  leads  Confirmed by DEJAH CARDONA, ALLYSON ([id number]) on 4/20/2023 10:55:09 PM    Referred By: CLARY LEONG           Confirmed By:ALLYSON POND MD

## 2023-08-17 NOTE — ASSESSMENT & PLAN NOTE
"Diabetes Management Status    Statin: Taking  ACE/ARB: Taking    Screening or Prevention Patient's value Goal Complete/Controlled?   HgA1C Testing and Control   Lab Results   Component Value Date    HGBA1C 5.4 07/06/2023      Annually/Less than 8% Yes   Lipid profile : 07/06/2023 Annually Yes   LDL control Lab Results   Component Value Date    LDLCALC 138.0 07/06/2023    Annually/Less than 100 mg/dl  No   Nephropathy screening Lab Results   Component Value Date    LABMICR 5.0 07/06/2023     No results found for: "PROTEINUA" Annually Yes   Blood pressure BP Readings from Last 1 Encounters:   08/17/23 130/88    Less than 140/90 Yes   Dilated retinal exam : 08/03/2022 Annually No   Foot exam   : 07/22/2022 Annually No     Lab Results   Component Value Date    HGBA1C 5.4 07/06/2023    HGBA1C 6.1 (H) 02/09/2023    HGBA1C 5.9 (H) 08/18/2022    EGFRNORACEVR >60.0 07/06/2023    MICALBCREAT 5.1 07/06/2023    LDLCALC 138.0 07/06/2023     No results found for: "GLUTAMICACID", "CPEPTIDE"   Last 5 Patient Entered Readings                                          Most Recent A1c:     There is no flowsheet data to display.        HEALTH MAINTENANCE: Diabetic health maintenance interventions reviewed and are up to date except for:      Topic    Eye Exam       "

## 2023-08-17 NOTE — PROGRESS NOTES
"OFFICE VISIT 8/17/23  9:00 AM CDT    Subjective   CHIEF COMPLAINT: Follow-up    Type 2 diabetes mellitus is well controlled based on her report of home monitoring and the most recent hemoglobin A1c. She tolerates her current regimen without experiencing hypoglycemia.  Paroxysmal atrial fibrillation appears rate and rhythm controlled. She is on anticoagulation as prescribed by her cardiologist.  Hypertension is controlled based on in-office readings, and she tolerates her blood pressure medications well, as she reports.  LDL cholesterol is above goal. She takes Rosuvastatin as instructed and plans to make therapeutic lifestyle changes.  She continues to work on improving her diet and losing weight.  She had a colonoscopy done earlier this year, as she reports. We are requesting a copy of that record. She agreed to address several aspects of health maintenance. We'll schedule her next labs for January and plan a follow-up as needed in the meantime.      Review of Systems   Respiratory:  Negative for chest tightness and shortness of breath.    Cardiovascular:  Negative for chest pain.   Endocrine: Negative for polydipsia and polyuria.         Objective   Vitals:    08/17/23 0804   BP: 130/88   BP Location: Right arm   Patient Position: Sitting   BP Method: Large (Manual)   Pulse: 65   Resp: 19   Temp: 97.3 °F (36.3 °C)   SpO2: 98%   Weight: 97.4 kg (214 lb 11.7 oz)   Height: 5' 6" (1.676 m)   Physical Exam  Vitals reviewed.   Constitutional:       General: She is not in acute distress.     Appearance: Normal appearance. She is not ill-appearing or diaphoretic.   Cardiovascular:      Rate and Rhythm: Normal rate and regular rhythm.   Pulmonary:      Effort: Pulmonary effort is normal.      Breath sounds: Normal breath sounds.   Skin:     General: Skin is warm and dry.   Neurological:      Mental Status: She is alert and oriented to person, place, and time. Mental status is at baseline.   Psychiatric:         Mood and " "Affect: Mood normal.         Behavior: Behavior normal.         Judgment: Judgment normal.   DIABETIC FOOT EXAM:  Protective Sensation (w/ 10 gram monofilament):  Right: Intact  Left: Intact    Visual Inspection:  Normal -  Bilateral    Pedal Pulses:   Right: Present  Left: Present    Posterior Tibialis Pulses:   Right:Present  Left: Present        Assessment and Plan   1. Type 2 diabetes mellitus with other specified complication, with long-term current use of insulin  Assessment & Plan:  Diabetes Management Status    Statin: Taking  ACE/ARB: Taking    Screening or Prevention Patient's value Goal Complete/Controlled?   HgA1C Testing and Control   Lab Results   Component Value Date    HGBA1C 5.4 07/06/2023      Annually/Less than 8% Yes   Lipid profile : 07/06/2023 Annually Yes   LDL control Lab Results   Component Value Date    LDLCALC 138.0 07/06/2023    Annually/Less than 100 mg/dl  No   Nephropathy screening Lab Results   Component Value Date    LABMICR 5.0 07/06/2023     No results found for: "PROTEINUA" Annually Yes   Blood pressure BP Readings from Last 1 Encounters:   08/17/23 130/88    Less than 140/90 Yes   Dilated retinal exam : 08/03/2022 Annually No   Foot exam   : 07/22/2022 Annually No     Lab Results   Component Value Date    HGBA1C 5.4 07/06/2023    HGBA1C 6.1 (H) 02/09/2023    HGBA1C 5.9 (H) 08/18/2022    EGFRNORACEVR >60.0 07/06/2023    MICALBCREAT 5.1 07/06/2023    LDLCALC 138.0 07/06/2023     No results found for: "GLUTAMICACID", "CPEPTIDE"   Last 5 Patient Entered Readings                                          Most Recent A1c:     There is no flowsheet data to display.        HEALTH MAINTENANCE: Diabetic health maintenance interventions reviewed and are up to date except for:      Topic    Eye Exam         Orders:  -     Hemoglobin A1C; Standing  -     Comprehensive Metabolic Panel; Standing  -     LANTUS SOLOSTAR U-100 INSULIN glargine 100 units/mL SubQ pen; INJECT 30 UNITS SUBCUTANEOUSLY " "NIGHTLY  Dispense: 30 mL; Refill: 2  -     metFORMIN (GLUCOPHAGE-XR) 500 MG ER 24hr tablet; Take 1 tablet (500 mg total) by mouth once daily.  Dispense: 90 tablet; Refill: 3  -     insulin lispro 100 unit/mL pen; INJECT 9 UNITS SUBCUTANEOUSLY THREE TIMES DAILY BEFORE MEAL(S)  Dispense: 15 mL; Refill: 5    2. Hyperaldosteronism  Assessment & Plan:  Lab Results   Component Value Date    ALDOSTERONE 15.7 10/17/2022    RENIN 0.2 10/17/2022    ALDRENINCALC 78.5 (H) 10/17/2022   Clinically stable.  PLAN: Monitor/treat BP, monitor labs.    Orders:  -     Comprehensive Metabolic Panel; Standing  -     Aldosterone/Renin Activity Ratio; Future; Expected date: 08/17/2023    3. Paroxysmal atrial fibrillation    4. Diuretic-induced hypokalemia    5. Obesity complicating type 2 diabetes mellitus  Assessment & Plan:  Wt Readings from Last 6 Encounters:   08/17/23 97.4 kg (214 lb 11.7 oz)   04/24/23 97.1 kg (214 lb)   04/20/23 97.2 kg (214 lb 4.6 oz)   11/10/22 97.7 kg (215 lb 6.2 oz)   10/20/22 101.6 kg (224 lb)   10/17/22 104.6 kg (230 lb 9.6 oz)     Estimated body mass index is 34.66 kg/m² as calculated from the following:    Height as of this encounter: 5' 6" (1.676 m).    Weight as of this encounter: 97.4 kg (214 lb 11.7 oz).        6. Hyperlipidemia complicating type 2 diabetes  Assessment & Plan:  Lab Results   Component Value Date    CHOL 217 (H) 07/06/2023    CHOL 169 08/18/2022    TRIG 80 07/06/2023    TRIG 77 08/18/2022    HDL 63 07/06/2023    HDL 60 08/18/2022    LDLCALC 138.0 07/06/2023    LDLCALC 93.6 08/18/2022    NONHDLCHOL 154 07/06/2023    NONHDLCHOL 109 08/18/2022    AST 19 07/06/2023    ALT 12 07/06/2023     The 10-year ASCVD risk score (Sharmila CHENEY, et al., 2019) is: 26.2%    Values used to calculate the score:      Age: 68 years      Sex: Female      Is Non- : Yes      Diabetic: Yes      Tobacco smoker: No      Systolic Blood Pressure: 130 mmHg      Is BP treated: Yes      HDL " Cholesterol: 63 mg/dL      Total Cholesterol: 217 mg/dL     Orders:  -     Comprehensive Metabolic Panel; Standing  -     rosuvastatin (CRESTOR) 20 MG tablet; Take 1 tablet (20 mg total) by mouth every evening.  Dispense: 90 tablet; Refill: 3    7. Hypertension complicating diabetes  Assessment & Plan:  BP Readings from Last 6 Encounters:   08/17/23 130/88   04/24/23 126/72   04/20/23 126/72   11/10/22 130/80   10/27/22 (!) 132/98   10/20/22 (!) 146/92     Lab Results   Component Value Date    EGFRNORACEVR >60.0 07/06/2023    CREATININE 0.8 07/06/2023    BUN 17 07/06/2023    K 4.0 07/06/2023     07/06/2023     07/06/2023     Results for orders placed or performed during the hospital encounter of 04/20/23   SCHEDULED EKG 12-LEAD (to Muse)    Collection Time: 04/20/23  8:00 AM    Narrative    Test Reason : I48.0,Z00.00,    Vent. Rate : 078 BPM     Atrial Rate : 078 BPM     P-R Int : 178 ms          QRS Dur : 086 ms      QT Int : 356 ms       P-R-T Axes : 094 051 -72 degrees     QTc Int : 405 ms    Normal sinus rhythm  ST and T wave abnormality, consider inferior ischemia  ST and T wave abnormality, consider anterolateral ischemia  Abnormal ECG  When compared with ECG of 20-OCT-2022 08:45,  Inverted T waves have replaced nonspecific T wave abnormality in Inferior  leads  Confirmed by DEJAH CARDONA, ALLYSON ([id number]) on 4/20/2023 10:55:09 PM    Referred By: CLARY LEONG           Confirmed By:ALLYSON POND MD        Orders:  -     Comprehensive Metabolic Panel; Standing  -     carvediloL (COREG) 6.25 MG tablet; Take 1 tablet (6.25 mg total) by mouth 2 (two) times daily.  Dispense: 180 tablet; Refill: 2  -     chlorthalidone (HYGROTEN) 25 MG Tab; Take 1 tablet (25 mg total) by mouth once daily.  Dispense: 90 tablet; Refill: 2  -     valsartan (DIOVAN) 320 MG tablet; Take 1 tablet (320 mg total) by mouth once daily.  Dispense: 90 tablet; Refill: 3    8. Diabetic eye exam  -     Ambulatory referral/consult to Ophthalmology;  "Future; Expected date: 08/24/2023    9. Breast cancer screening by mammogram  -     Mammo Digital Screening Bilat; Future; Expected date: 09/01/2023    10. Asymptomatic menopausal state  -     DXA Bone Density Axial Skeleton 1 or more sites; Future; Expected date: 10/21/2023    11. Essential hypertension  -     carvediloL (COREG) 6.25 MG tablet; Take 1 tablet (6.25 mg total) by mouth 2 (two) times daily.  Dispense: 180 tablet; Refill: 2  -     chlorthalidone (HYGROTEN) 25 MG Tab; Take 1 tablet (25 mg total) by mouth once daily.  Dispense: 90 tablet; Refill: 2  -     potassium chloride SA (K-DUR,KLOR-CON M) 10 MEQ tablet; Take 1 tablet (10 mEq total) by mouth once daily.  Dispense: 90 tablet; Refill: 3  -     valsartan (DIOVAN) 320 MG tablet; Take 1 tablet (320 mg total) by mouth once daily.  Dispense: 90 tablet; Refill: 3    12. Type 2 diabetes mellitus with microalbuminuria, with long-term current use of insulin  -     LANTUS SOLOSTAR U-100 INSULIN glargine 100 units/mL SubQ pen; INJECT 30 UNITS SUBCUTANEOUSLY NIGHTLY  Dispense: 30 mL; Refill: 2  -     metFORMIN (GLUCOPHAGE-XR) 500 MG ER 24hr tablet; Take 1 tablet (500 mg total) by mouth once daily.  Dispense: 90 tablet; Refill: 3  -     insulin lispro 100 unit/mL pen; INJECT 9 UNITS SUBCUTANEOUSLY THREE TIMES DAILY BEFORE MEAL(S)  Dispense: 15 mL; Refill: 5    13. Pure hypercholesterolemia  -     rosuvastatin (CRESTOR) 20 MG tablet; Take 1 tablet (20 mg total) by mouth every evening.  Dispense: 90 tablet; Refill: 3    Unless noted herein, any chronic conditions are represented as and appear stable, and no other significant complaints or concerns were reported.    Follow up in about 5 months (around 1/17/2024).        Documentation entered by me for this encounter may have been done in part using speech-recognition technology. Although I have made an effort to ensure accuracy, "sound like" errors may exist and should be interpreted in context.   "

## 2023-09-26 ENCOUNTER — HOSPITAL ENCOUNTER (OUTPATIENT)
Dept: RADIOLOGY | Facility: HOSPITAL | Age: 68
Discharge: HOME | End: 2023-09-26
Attending: FAMILY MEDICINE
Payer: MEDICARE

## 2023-09-26 VITALS — BODY MASS INDEX: 34.51 KG/M2 | HEIGHT: 66 IN | WEIGHT: 214.75 LBS

## 2023-09-26 DIAGNOSIS — Z12.31 BREAST CANCER SCREENING BY MAMMOGRAM: ICD-10-CM

## 2023-09-26 PROCEDURE — 77067 MAMMO DIGITAL SCREENING BILAT WITH TOMO: ICD-10-PCS | Mod: 26,,, | Performed by: RADIOLOGY

## 2023-09-26 PROCEDURE — 77067 SCR MAMMO BI INCL CAD: CPT | Mod: TC

## 2023-09-26 PROCEDURE — 77063 BREAST TOMOSYNTHESIS BI: CPT | Mod: 26,,, | Performed by: RADIOLOGY

## 2023-09-26 PROCEDURE — 77067 SCR MAMMO BI INCL CAD: CPT | Mod: 26,,, | Performed by: RADIOLOGY

## 2023-09-26 PROCEDURE — 77063 MAMMO DIGITAL SCREENING BILAT WITH TOMO: ICD-10-PCS | Mod: 26,,, | Performed by: RADIOLOGY

## 2023-10-05 NOTE — PROGRESS NOTES
Lacey Fischer.    I am happy to report that your recent breast imaging did NOT show evidence of cancer.    An annual mammogram is the best test to screen for breast cancer, but it is not perfect, and it can miss some cancers. So, even though your mammogram was normal, if you notice any lump or change in one of your breasts, please schedule an appointment with me for a proper evaluation.    Thanks for letting me care for you, and thanks for trusting [facility] with your healthcare needs.    Sincerely,    JAKE Merrill MD

## 2023-10-13 DIAGNOSIS — Z00.00 ROUTINE HEALTH MAINTENANCE: ICD-10-CM

## 2023-10-13 DIAGNOSIS — I48.0 PAROXYSMAL ATRIAL FIBRILLATION: Primary | Chronic | ICD-10-CM

## 2023-10-20 ENCOUNTER — OFFICE VISIT (OUTPATIENT)
Dept: OPHTHALMOLOGY | Facility: CLINIC | Age: 68
End: 2023-10-20
Payer: MEDICARE

## 2023-10-20 DIAGNOSIS — E11.9 DIABETIC EYE EXAM: ICD-10-CM

## 2023-10-20 DIAGNOSIS — H40.1131 PRIMARY OPEN ANGLE GLAUCOMA (POAG) OF BOTH EYES, MILD STAGE: Primary | ICD-10-CM

## 2023-10-20 DIAGNOSIS — H25.012 CORTICAL SENILE CATARACT OF LEFT EYE: ICD-10-CM

## 2023-10-20 DIAGNOSIS — Z01.00 DIABETIC EYE EXAM: ICD-10-CM

## 2023-10-20 DIAGNOSIS — H25.011 CORTICAL SENILE CATARACT OF RIGHT EYE: ICD-10-CM

## 2023-10-20 PROCEDURE — 1160F PR REVIEW ALL MEDS BY PRESCRIBER/CLIN PHARMACIST DOCUMENTED: ICD-10-PCS | Mod: CPTII,S$GLB,, | Performed by: OPHTHALMOLOGY

## 2023-10-20 PROCEDURE — 3066F NEPHROPATHY DOC TX: CPT | Mod: CPTII,S$GLB,, | Performed by: OPHTHALMOLOGY

## 2023-10-20 PROCEDURE — 99214 PR OFFICE/OUTPT VISIT, EST, LEVL IV, 30-39 MIN: ICD-10-PCS | Mod: S$GLB,,, | Performed by: OPHTHALMOLOGY

## 2023-10-20 PROCEDURE — 3044F PR MOST RECENT HEMOGLOBIN A1C LEVEL <7.0%: ICD-10-PCS | Mod: CPTII,S$GLB,, | Performed by: OPHTHALMOLOGY

## 2023-10-20 PROCEDURE — 3061F PR NEG MICROALBUMINURIA RESULT DOCUMENTED/REVIEW: ICD-10-PCS | Mod: CPTII,S$GLB,, | Performed by: OPHTHALMOLOGY

## 2023-10-20 PROCEDURE — 3061F NEG MICROALBUMINURIA REV: CPT | Mod: CPTII,S$GLB,, | Performed by: OPHTHALMOLOGY

## 2023-10-20 PROCEDURE — 1160F RVW MEDS BY RX/DR IN RCRD: CPT | Mod: CPTII,S$GLB,, | Performed by: OPHTHALMOLOGY

## 2023-10-20 PROCEDURE — 2023F PR DILATED RETINAL EXAM W/O EVID OF RETINOPATHY: ICD-10-PCS | Mod: CPTII,S$GLB,, | Performed by: OPHTHALMOLOGY

## 2023-10-20 PROCEDURE — 4010F ACE/ARB THERAPY RXD/TAKEN: CPT | Mod: CPTII,S$GLB,, | Performed by: OPHTHALMOLOGY

## 2023-10-20 PROCEDURE — 3044F HG A1C LEVEL LT 7.0%: CPT | Mod: CPTII,S$GLB,, | Performed by: OPHTHALMOLOGY

## 2023-10-20 PROCEDURE — 2023F DILAT RTA XM W/O RTNOPTHY: CPT | Mod: CPTII,S$GLB,, | Performed by: OPHTHALMOLOGY

## 2023-10-20 PROCEDURE — 99999 PR PBB SHADOW E&M-EST. PATIENT-LVL III: CPT | Mod: PBBFAC,,, | Performed by: OPHTHALMOLOGY

## 2023-10-20 PROCEDURE — 1159F PR MEDICATION LIST DOCUMENTED IN MEDICAL RECORD: ICD-10-PCS | Mod: CPTII,S$GLB,, | Performed by: OPHTHALMOLOGY

## 2023-10-20 PROCEDURE — 1159F MED LIST DOCD IN RCRD: CPT | Mod: CPTII,S$GLB,, | Performed by: OPHTHALMOLOGY

## 2023-10-20 PROCEDURE — 4010F PR ACE/ARB THEARPY RXD/TAKEN: ICD-10-PCS | Mod: CPTII,S$GLB,, | Performed by: OPHTHALMOLOGY

## 2023-10-20 PROCEDURE — 3066F PR DOCUMENTATION OF TREATMENT FOR NEPHROPATHY: ICD-10-PCS | Mod: CPTII,S$GLB,, | Performed by: OPHTHALMOLOGY

## 2023-10-20 PROCEDURE — 99999 PR PBB SHADOW E&M-EST. PATIENT-LVL III: ICD-10-PCS | Mod: PBBFAC,,, | Performed by: OPHTHALMOLOGY

## 2023-10-20 PROCEDURE — 99214 OFFICE O/P EST MOD 30 MIN: CPT | Mod: S$GLB,,, | Performed by: OPHTHALMOLOGY

## 2023-10-20 RX ORDER — DORZOLAMIDE HYDROCHLORIDE AND TIMOLOL MALEATE 20; 5 MG/ML; MG/ML
1 SOLUTION/ DROPS OPHTHALMIC EVERY 12 HOURS
Qty: 30 ML | Refills: 6 | Status: SHIPPED | OUTPATIENT
Start: 2023-10-20

## 2023-10-20 NOTE — PROGRESS NOTES
HPI     Glaucoma            Comments: Pt reports for 4m dil. Denies any pain or irritation. Va   stable. 100% compliant with gtts.           Comments    DMII 5/2021   HTN  CATARACTS  SCOAG by c/d and IOP  Gcl : 29-26--3/30/2022    Fam H/O Glaucoma - Mom  Pt lost mom- 3/2022, she was MGM Pt (Ana Montoya)      Cosopt BID OU            Last edited by Tanner Lopez on 10/20/2023  9:17 AM.            Assessment /Plan     For exam results, see Encounter Report.      ICD-10-CM ICD-9-CM    1. Primary open angle glaucoma (POAG) of both eyes, mild stage  H40.1131 365.11 Doing well - intraocular pressure is within acceptable range relative to target pressure with no evidence of progression.   Continue current treatment.  Reviewed importance of continued compliance with treatment and follow up.      365.71       2. Cortical senile cataract of left eye  H25.012 366.15 You were found to have an early cataract in your eye(s) today. However the cataract is not affecting your activities of daily living, such as reading and driving. You do not need surgery at this time. We will recheck your cataract at your next visit. You are welcome to call for an earlier appointment if your vision gets worse.         3. Cortical senile cataract of right eye  H25.011 366.15 As above       4. Diabetic eye exam  Z01.00 V72.0 Ambulatory referral/consult to Ophthalmology    Diabetes with no diabetic retinopathy on dilated exam.   Reviewed diabetic eye precautions including excellent blood sugar control, and importance of regular follow up.     E11.9 250.00           Return to clinic 6 months for HVF 24-2 and GOCT          Cosopt BID OU

## 2023-10-23 ENCOUNTER — OFFICE VISIT (OUTPATIENT)
Dept: CARDIOLOGY | Facility: CLINIC | Age: 68
End: 2023-10-23
Payer: MEDICARE

## 2023-10-23 ENCOUNTER — HOSPITAL ENCOUNTER (OUTPATIENT)
Dept: CARDIOLOGY | Facility: HOSPITAL | Age: 68
Discharge: HOME | End: 2023-10-23
Attending: INTERNAL MEDICINE
Payer: MEDICARE

## 2023-10-23 VITALS
SYSTOLIC BLOOD PRESSURE: 134 MMHG | DIASTOLIC BLOOD PRESSURE: 72 MMHG | HEIGHT: 66 IN | WEIGHT: 210.13 LBS | BODY MASS INDEX: 33.77 KG/M2 | HEART RATE: 85 BPM

## 2023-10-23 DIAGNOSIS — E11.69 TYPE 2 DIABETES MELLITUS WITH OTHER SPECIFIED COMPLICATION, WITHOUT LONG-TERM CURRENT USE OF INSULIN: Chronic | ICD-10-CM

## 2023-10-23 DIAGNOSIS — E11.59 HYPERTENSION COMPLICATING DIABETES: Primary | Chronic | ICD-10-CM

## 2023-10-23 DIAGNOSIS — I15.2 HYPERTENSION COMPLICATING DIABETES: Primary | Chronic | ICD-10-CM

## 2023-10-23 DIAGNOSIS — I48.0 PAROXYSMAL ATRIAL FIBRILLATION: Chronic | ICD-10-CM

## 2023-10-23 DIAGNOSIS — E78.5 HYPERLIPIDEMIA ASSOCIATED WITH TYPE 2 DIABETES MELLITUS: Chronic | ICD-10-CM

## 2023-10-23 DIAGNOSIS — Z00.00 ROUTINE HEALTH MAINTENANCE: ICD-10-CM

## 2023-10-23 DIAGNOSIS — E11.69 HYPERLIPIDEMIA ASSOCIATED WITH TYPE 2 DIABETES MELLITUS: Chronic | ICD-10-CM

## 2023-10-23 PROCEDURE — 3044F PR MOST RECENT HEMOGLOBIN A1C LEVEL <7.0%: ICD-10-PCS | Mod: CPTII,S$GLB,, | Performed by: INTERNAL MEDICINE

## 2023-10-23 PROCEDURE — 93010 EKG 12-LEAD: ICD-10-PCS | Mod: ,,, | Performed by: INTERNAL MEDICINE

## 2023-10-23 PROCEDURE — 3008F BODY MASS INDEX DOCD: CPT | Mod: CPTII,S$GLB,, | Performed by: INTERNAL MEDICINE

## 2023-10-23 PROCEDURE — 3288F PR FALLS RISK ASSESSMENT DOCUMENTED: ICD-10-PCS | Mod: CPTII,S$GLB,, | Performed by: INTERNAL MEDICINE

## 2023-10-23 PROCEDURE — 93010 ELECTROCARDIOGRAM REPORT: CPT | Mod: ,,, | Performed by: INTERNAL MEDICINE

## 2023-10-23 PROCEDURE — 3075F PR MOST RECENT SYSTOLIC BLOOD PRESS GE 130-139MM HG: ICD-10-PCS | Mod: CPTII,S$GLB,, | Performed by: INTERNAL MEDICINE

## 2023-10-23 PROCEDURE — 1125F PR PAIN SEVERITY QUANTIFIED, PAIN PRESENT: ICD-10-PCS | Mod: CPTII,S$GLB,, | Performed by: INTERNAL MEDICINE

## 2023-10-23 PROCEDURE — 3075F SYST BP GE 130 - 139MM HG: CPT | Mod: CPTII,S$GLB,, | Performed by: INTERNAL MEDICINE

## 2023-10-23 PROCEDURE — 3044F HG A1C LEVEL LT 7.0%: CPT | Mod: CPTII,S$GLB,, | Performed by: INTERNAL MEDICINE

## 2023-10-23 PROCEDURE — 1159F MED LIST DOCD IN RCRD: CPT | Mod: CPTII,S$GLB,, | Performed by: INTERNAL MEDICINE

## 2023-10-23 PROCEDURE — 99214 OFFICE O/P EST MOD 30 MIN: CPT | Mod: S$GLB,,, | Performed by: INTERNAL MEDICINE

## 2023-10-23 PROCEDURE — 3288F FALL RISK ASSESSMENT DOCD: CPT | Mod: CPTII,S$GLB,, | Performed by: INTERNAL MEDICINE

## 2023-10-23 PROCEDURE — 1160F PR REVIEW ALL MEDS BY PRESCRIBER/CLIN PHARMACIST DOCUMENTED: ICD-10-PCS | Mod: CPTII,S$GLB,, | Performed by: INTERNAL MEDICINE

## 2023-10-23 PROCEDURE — 3066F PR DOCUMENTATION OF TREATMENT FOR NEPHROPATHY: ICD-10-PCS | Mod: CPTII,S$GLB,, | Performed by: INTERNAL MEDICINE

## 2023-10-23 PROCEDURE — 99214 PR OFFICE/OUTPT VISIT, EST, LEVL IV, 30-39 MIN: ICD-10-PCS | Mod: S$GLB,,, | Performed by: INTERNAL MEDICINE

## 2023-10-23 PROCEDURE — 1159F PR MEDICATION LIST DOCUMENTED IN MEDICAL RECORD: ICD-10-PCS | Mod: CPTII,S$GLB,, | Performed by: INTERNAL MEDICINE

## 2023-10-23 PROCEDURE — 3061F NEG MICROALBUMINURIA REV: CPT | Mod: CPTII,S$GLB,, | Performed by: INTERNAL MEDICINE

## 2023-10-23 PROCEDURE — 1125F AMNT PAIN NOTED PAIN PRSNT: CPT | Mod: CPTII,S$GLB,, | Performed by: INTERNAL MEDICINE

## 2023-10-23 PROCEDURE — 99999 PR PBB SHADOW E&M-EST. PATIENT-LVL IV: CPT | Mod: PBBFAC,,, | Performed by: INTERNAL MEDICINE

## 2023-10-23 PROCEDURE — 1101F PT FALLS ASSESS-DOCD LE1/YR: CPT | Mod: CPTII,S$GLB,, | Performed by: INTERNAL MEDICINE

## 2023-10-23 PROCEDURE — 4010F ACE/ARB THERAPY RXD/TAKEN: CPT | Mod: CPTII,S$GLB,, | Performed by: INTERNAL MEDICINE

## 2023-10-23 PROCEDURE — 99999 PR PBB SHADOW E&M-EST. PATIENT-LVL IV: ICD-10-PCS | Mod: PBBFAC,,, | Performed by: INTERNAL MEDICINE

## 2023-10-23 PROCEDURE — 3061F PR NEG MICROALBUMINURIA RESULT DOCUMENTED/REVIEW: ICD-10-PCS | Mod: CPTII,S$GLB,, | Performed by: INTERNAL MEDICINE

## 2023-10-23 PROCEDURE — 3078F DIAST BP <80 MM HG: CPT | Mod: CPTII,S$GLB,, | Performed by: INTERNAL MEDICINE

## 2023-10-23 PROCEDURE — 4010F PR ACE/ARB THEARPY RXD/TAKEN: ICD-10-PCS | Mod: CPTII,S$GLB,, | Performed by: INTERNAL MEDICINE

## 2023-10-23 PROCEDURE — 1160F RVW MEDS BY RX/DR IN RCRD: CPT | Mod: CPTII,S$GLB,, | Performed by: INTERNAL MEDICINE

## 2023-10-23 PROCEDURE — 1101F PR PT FALLS ASSESS DOC 0-1 FALLS W/OUT INJ PAST YR: ICD-10-PCS | Mod: CPTII,S$GLB,, | Performed by: INTERNAL MEDICINE

## 2023-10-23 PROCEDURE — 93005 ELECTROCARDIOGRAM TRACING: CPT

## 2023-10-23 PROCEDURE — 3078F PR MOST RECENT DIASTOLIC BLOOD PRESSURE < 80 MM HG: ICD-10-PCS | Mod: CPTII,S$GLB,, | Performed by: INTERNAL MEDICINE

## 2023-10-23 PROCEDURE — 3066F NEPHROPATHY DOC TX: CPT | Mod: CPTII,S$GLB,, | Performed by: INTERNAL MEDICINE

## 2023-10-23 PROCEDURE — 3008F PR BODY MASS INDEX (BMI) DOCUMENTED: ICD-10-PCS | Mod: CPTII,S$GLB,, | Performed by: INTERNAL MEDICINE

## 2023-10-23 RX ORDER — IBUPROFEN 600 MG/1
600 TABLET ORAL 3 TIMES DAILY
COMMUNITY
Start: 2023-08-08

## 2023-10-23 NOTE — PROGRESS NOTES
Subjective:   Patient ID:  Lacey Calderón is a 68 y.o. female who presents for follow up of Follow-up      69 yo female, came in for 6 months f/u  PMH PAF HTn HLD DM Dx in 2022,  Ekg NSR   echo at OL normal EF and LA size  BP fluctuated and med compliance  Added Valsartan and chlorthalidone one week ago. Dizziness improved.   Had the dental work    04/23 visit  On PT for the knee and leg. Plan to start the PT with stress maneuver  No chest pain dyspnea dizziness faint   BP LDL and A1C controlled   Ekg today NSR chronic STT change on inferolateral leads    Interval history  Recently right shoulder injury after her granddaughter fell on her. With right arm pain. MRI pending  04/23 echo Ef nl mild to mod TR,  Denied chest pain dyspnea dizziness faint palpitation. Snores h/o nl sleep study  AFIB [id number] score of 5. No active bleeding  Ekg NSR PACs t wave abnl chronic change              Past Medical History:   Diagnosis Date    Arthritis     Cardiac arrhythmia 11/13/2020    Cataract     Class 2 severe obesity due to excess calories with serious comorbidity and body mass index (BMI) of 37.0 to 37.9 in adult 10/19/2020    Essential hypertension 10/19/2020    Hyperlipidemia complicating type 2 diabetes 10/24/2020    Hypertension     Paroxysmal atrial fibrillation 11/13/2020    Primary localized osteoarthritis of knees, bilateral 10/19/2020    Pure hypercholesterolemia 10/24/2020    Pure hypercholesterolemia 10/24/2020    Type 2 diabetes mellitus with hyperglycemia, with long-term current use of insulin 6/3/2021    Type 2 diabetes mellitus with other specified complication 6/3/2021       Past Surgical History:   Procedure Laterality Date    MULTIPLE TOOTH EXTRACTIONS         Social History     Tobacco Use    Smoking status: Never    Smokeless tobacco: Never   Substance Use Topics    Alcohol use: Never    Drug use: Never       Family History   Problem Relation Age of Onset    Arthritis Mother      Heart disease Mother     Miscarriages / Stillbirths Mother     Dementia Mother     Glaucoma Mother     Cataracts Mother     No Known Problems Father          ROS    Objective:   Physical Exam  HENT:      Head: Normocephalic.   Eyes:      Pupils: Pupils are equal, round, and reactive to light.   Neck:      Thyroid: No thyromegaly.      Vascular: Normal carotid pulses. No carotid bruit or JVD.   Cardiovascular:      Rate and Rhythm: Normal rate and regular rhythm. No extrasystoles are present.     Chest Wall: PMI is not displaced.      Pulses: Normal pulses.      Heart sounds: Normal heart sounds. No murmur heard.     No gallop. No S3 sounds.   Pulmonary:      Effort: No respiratory distress.      Breath sounds: Normal breath sounds. No stridor.   Abdominal:      General: Bowel sounds are normal.      Palpations: Abdomen is soft.      Tenderness: There is no abdominal tenderness. There is no rebound.   Musculoskeletal:         General: Normal range of motion.   Skin:     Findings: No rash.   Neurological:      Mental Status: She is alert and oriented to person, place, and time.   Psychiatric:         Behavior: Behavior normal.       Lab Results   Component Value Date    CHOL 217 (H) 07/06/2023    CHOL 169 08/18/2022    CHOL 196 01/31/2022     Lab Results   Component Value Date    HDL 63 07/06/2023    HDL 60 08/18/2022    HDL 49 01/31/2022     Lab Results   Component Value Date    LDLCALC 138.0 07/06/2023    LDLCALC 93.6 08/18/2022    LDLCALC 126.8 01/31/2022     Lab Results   Component Value Date    TRIG 80 07/06/2023    TRIG 77 08/18/2022    TRIG 101 01/31/2022     Lab Results   Component Value Date    CHOLHDL 29.0 07/06/2023    CHOLHDL 35.5 08/18/2022    CHOLHDL 25.0 01/31/2022       Chemistry        Component Value Date/Time     07/06/2023 0950    K 4.0 07/06/2023 0950     07/06/2023 0950    CO2 26 07/06/2023 0950    BUN 17 07/06/2023 0950    CREATININE 0.8 07/06/2023 0950    GLU 61 (L)  "07/06/2023 0950        Component Value Date/Time    CALCIUM 9.8 07/06/2023 0950    ALKPHOS 63 07/06/2023 0950    AST 19 07/06/2023 0950    ALT 12 07/06/2023 0950    BILITOT 0.4 07/06/2023 0950    ESTGFRAFRICA >60.0 01/31/2022 1039    EGFRNONAA >60.0 01/31/2022 1039          Lab Results   Component Value Date    HGBA1C 5.4 07/06/2023     Lab Results   Component Value Date    TSH 2.405 06/03/2021     No results found for: "INR", "PROTIME"  Lab Results   Component Value Date    WBC 5.67 02/09/2023    HGB 12.5 02/09/2023    HCT 39.3 02/09/2023    MCV 86 02/09/2023     02/09/2023     BMP  Sodium   Date Value Ref Range Status   07/06/2023 142 136 - 145 mmol/L Final     Potassium   Date Value Ref Range Status   07/06/2023 4.0 3.5 - 5.1 mmol/L Final     Chloride   Date Value Ref Range Status   07/06/2023 107 95 - 110 mmol/L Final     CO2   Date Value Ref Range Status   07/06/2023 26 23 - 29 mmol/L Final     BUN   Date Value Ref Range Status   07/06/2023 17 8 - 23 mg/dL Final     Creatinine   Date Value Ref Range Status   07/06/2023 0.8 0.5 - 1.4 mg/dL Final     Calcium   Date Value Ref Range Status   07/06/2023 9.8 8.7 - 10.5 mg/dL Final     Anion Gap   Date Value Ref Range Status   07/06/2023 9 8 - 16 mmol/L Final     eGFR if    Date Value Ref Range Status   01/31/2022 >60.0 >60 mL/min/1.73 m^2 Final     eGFR if non    Date Value Ref Range Status   01/31/2022 >60.0 >60 mL/min/1.73 m^2 Final     Comment:     Calculation used to obtain the estimated glomerular filtration  rate (eGFR) is the CKD-EPI equation.        BNP  @LABRCNTIP(BNP,BNPTRIAGEBLO)@  @LABRCNTIP(troponini)@  CrCl cannot be calculated (Patient's most recent lab result is older than the maximum 7 days allowed.).  No results found in the last 24 hours.  No results found in the last 24 hours.  No results found in the last 24 hours.    Assessment:      1. Hypertension complicating diabetes    2. Hyperlipidemia complicating " type 2 diabetes    3. Paroxysmal atrial fibrillation    4. Type 2 diabetes mellitus with other specified complication, without long-term current use of insulin      BP LDL and A1c controlled  PAF on SR    Plan:   Continue eliquis 5 mg bid due to AFIB  Continue coreg 6.25 mg bid amlodipine vlasartan and Chlorthalidone  DM Rx per PCP  Counseled DASH  Check Lipid profile with PCP in 6 months  Recommend heart-healthy diet, weight control and regular exercise.  Bladimir. Risk modification.   I have reviewed all pertinent labs and cardiac studies independently. Plans and recommendations have been formulated under my direct supervision. All questions answered and patient voiced understanding.   If symptoms persist go to the ED  RTC in 12 months

## 2023-11-01 ENCOUNTER — TELEPHONE (OUTPATIENT)
Dept: INTERNAL MEDICINE | Facility: CLINIC | Age: 68
End: 2023-11-01
Payer: MEDICARE

## 2023-11-01 DIAGNOSIS — Z12.11 SCREENING FOR COLORECTAL CANCER: Primary | ICD-10-CM

## 2023-11-01 DIAGNOSIS — Z12.12 SCREENING FOR COLORECTAL CANCER: Primary | ICD-10-CM

## 2023-11-01 NOTE — TELEPHONE ENCOUNTER
----- Message from Consuelo Land sent at 11/1/2023 12:00 PM CDT -----  Contact: Lacey Ramírez is calling in regards to getting order for colonoscopy. Please call back at .[phone].      Thanks  ELLIE

## 2023-11-02 ENCOUNTER — PATIENT MESSAGE (OUTPATIENT)
Dept: ADMINISTRATIVE | Facility: HOSPITAL | Age: 68
End: 2023-11-02
Payer: MEDICARE

## 2023-11-03 ENCOUNTER — TELEPHONE (OUTPATIENT)
Dept: INTERNAL MEDICINE | Facility: CLINIC | Age: 68
End: 2023-11-03
Payer: MEDICARE

## 2023-11-03 NOTE — TELEPHONE ENCOUNTER
Tried calling pt no answer VM was left.    ----- Message from Chiquita Torres sent at 11/3/2023  2:25 PM CDT -----  Contact: Lacey Rahman would like a call back at [phone] in regards to having questions about the insulin she had sent over to her pharmacy. Patient received the viles with syringes and patient would rather the pin instead if possible.  Thanks   Am

## 2023-11-07 ENCOUNTER — TELEPHONE (OUTPATIENT)
Dept: INTERNAL MEDICINE | Facility: CLINIC | Age: 68
End: 2023-11-07
Payer: MEDICARE

## 2023-11-07 NOTE — TELEPHONE ENCOUNTER
Pt stated she was given insulin lispro in a vial with needles. Per her MAR pt prescribed insulin lispro pen. Spoke with Pharmacist Zach ([facility]). Verified order was for insulin lispro pen. States order will be filled today. Notified pt of medication refill. States she will  medication today.

## 2023-11-07 NOTE — TELEPHONE ENCOUNTER
"TO MY TEAM:   Please contact Lacey and relay message below.  --------------------------------------------------------------------------------   Colon cancer screening saves lives, so I've entered an order for you to have a colonoscopy.    Before you can have your colonoscopy, you must schedule a telephone appointment for Pre-Admit Testing ("P.A.T."). During the P.A.T. phone appointment, one of our endoscopy nurses will review your medical history with you and tell you if any additional steps need to be taken to help your test go smoothly. The nurse will give you instructions on preparing for the colonoscopy and let you know what to expect. They will also answer any questions you may have about the test.    Someone from [facility] should be calling you soon to help schedule your P.A.T. phone appointment. If you haven't been contacted within the next 3 business days, you can schedule your P.A.T. phone appointment from your [facility] account or by calling our appointment desk at [phone].    You can learn more about cancer screening by colonoscopy at: [email or web address]   "

## 2023-11-08 ENCOUNTER — HOSPITAL ENCOUNTER (OUTPATIENT)
Dept: PREADMISSION TESTING | Facility: HOSPITAL | Age: 68
Discharge: HOME | End: 2023-11-08
Attending: FAMILY MEDICINE
Payer: MEDICARE

## 2023-11-08 DIAGNOSIS — Z12.11 SCREENING FOR COLORECTAL CANCER: Primary | ICD-10-CM

## 2023-11-08 DIAGNOSIS — Z12.12 SCREENING FOR COLORECTAL CANCER: Primary | ICD-10-CM

## 2023-11-08 RX ORDER — SODIUM, POTASSIUM,MAG SULFATES 17.5-3.13G
1 SOLUTION, RECONSTITUTED, ORAL ORAL DAILY
Qty: 1 KIT | Refills: 0 | Status: SHIPPED | OUTPATIENT
Start: 2023-11-08 | End: 2023-11-10

## 2023-11-10 ENCOUNTER — E-CONSULT (OUTPATIENT)
Dept: CARDIOLOGY | Facility: HOSPITAL | Age: 68
End: 2023-11-10
Payer: MEDICARE

## 2023-11-10 DIAGNOSIS — Z01.810 PREOP CARDIOVASCULAR EXAM: ICD-10-CM

## 2023-11-10 PROCEDURE — 99451 PR INTERPROF, PHONE/INTERNET/EHR, CONSULT, >= 5MINS: ICD-10-PCS | Mod: ,,, | Performed by: INTERNAL MEDICINE

## 2023-11-10 PROCEDURE — 99451 NTRPROF PH1/NTRNET/EHR 5/>: CPT | Mod: ,,, | Performed by: INTERNAL MEDICINE

## 2023-11-10 NOTE — CONSULTS
[facility] CARDIOLOGY  Response for E-Consult     Patient Name: Lacey Calderón  MRN: [id number]  Primary Care Provider: SURINDER Merrill MD   Requesting Provider: Tesha Warren NP  E-Consult to Cardiology  Consult performed by: David Whitaker MD  Consult ordered by: Tesha Warren NP             69 yo female,  E consult for preop clearance of colonoscopy  The chart reviewed.  PMH PAF HTN HLD DM  EKG 10/123 NSr PAC    Plan  Elevated periop risk of CV events for non-high risk procedure.  Ok to proceed the scheduled procedure without further cardiac study.  OK to hold eliquis 2 days before the procedure and resume postop once hemodynamically stable      Total time of Consultation: 10 minute    I did not speak to the requesting provider verbally about this.     *This eConsult is based on the clinical data available to me and is furnished without benefit of a physical examination. The eConsult will need to be interpreted in light of any clinical issues or changes in patient status not available to me at the time of filing this eConsults. Significant changes in patient condition or level of acuity should result in immediate formal consultation and reevaluation. Please alert me if you have further questions.    Thank you for this eConsult referral.     David Whitaker MD  [facility]ARDIOLOG[facility]

## 2023-11-30 ENCOUNTER — TELEPHONE (OUTPATIENT)
Dept: CARDIOLOGY | Facility: CLINIC | Age: 68
End: 2023-11-30
Payer: MEDICARE

## 2023-11-30 NOTE — TELEPHONE ENCOUNTER
Informed pt of instructions about when to stop eliquis.           Spoke with pt in regards to upcoming colonoscopy. Pt needs to know if she is cleared to be off eliquis and if so how long and when she resume them. Pt is scheduled for 12/7/23. Please advise            ----- Message from Wen Love sent at 11/30/2023 12:17 PM CST -----  Contact: JUANCHO ORTEGA [[id number]]  ..Type:  Patient Requesting Call    Who Called: JUANCHO ORTEGA [[id number]]  Does the patient know what this is regarding?: pt have questions regarding medications and upcoming appt  Would the patient rather a call back or a response via [facility]?  call  Best Call Back Number: .[phone] (home)   Additional Information:

## 2023-12-04 ENCOUNTER — ANESTHESIA EVENT (OUTPATIENT)
Dept: ENDOSCOPY | Facility: HOSPITAL | Age: 68
End: 2023-12-04
Payer: MEDICARE

## 2023-12-04 NOTE — ANESTHESIA PREPROCEDURE EVALUATION
12/04/2023  Lacey Calderón is a 68 y.o., female.  Past Surgical History:   Procedure Laterality Date    MULTIPLE TOOTH EXTRACTIONS       Past Medical History:   Diagnosis Date    Arthritis     Cardiac arrhythmia 11/13/2020    Cataract     Class 2 severe obesity due to excess calories with serious comorbidity and body mass index (BMI) of 37.0 to 37.9 in adult 10/19/2020    Essential hypertension 10/19/2020    Hyperlipidemia complicating type 2 diabetes 10/24/2020    Hypertension     Paroxysmal atrial fibrillation 11/13/2020    Primary localized osteoarthritis of knees, bilateral 10/19/2020    Pure hypercholesterolemia 10/24/2020    Pure hypercholesterolemia 10/24/2020    Type 2 diabetes mellitus with hyperglycemia, with long-term current use of insulin 6/3/2021    Type 2 diabetes mellitus with other specified complication 6/3/2021   04/24/23 0957   Echo (Final result)   View Image     Impression  No impression found.      Narrative  · The left ventricle is normal in size with concentric remodeling and  normal systolic function.  · The estimated ejection fraction is 65%.  · Normal left ventricular diastolic function.  · Normal right ventricular size with normal right ventricular systolic  function.  · Mild to moderate tricuspid regurgitation.  · Intermediate central venous pressure (8 mmHg).  · The estimated PA systolic pressure is 38 mmHg.   10/23/23  EKG  Sinus rhythm with Premature atrial complexes   T wave abnormality, consider inferolateral ischemia   Abnormal ECG   No previous ECGs available    11/10/23 CARDIOLOGY NOTE  Elevated periop risk of CV events for non-high risk procedure.  Ok to proceed the scheduled procedure without further cardiac study.  OK to hold eliquis 2 days before the procedure and resume postop once hemodynamically stable       Pre-op Assessment    I have reviewed the Patient Summary  Reports.    I have reviewed the NPO Status.   I have reviewed the Medications.     Review of Systems  Anesthesia Hx:  No problems with previous Anesthesia             Denies Family Hx of Anesthesia complications.    Denies Personal Hx of Anesthesia complications.                    Social:  Non-Smoker       Hematology/Oncology:  Hematology Normal   Oncology Normal                                   EENT/Dental:  EENT/Dental Normal           Cardiovascular:     Hypertension    Dysrhythmias atrial fibrillation      hyperlipidemia    PAF, followed and clr'd by cards.  Echo with NL EF, off OAC for procedure.                         Pulmonary:  Pulmonary Normal                       Renal/:  Renal/ Normal                 Hepatic/GI:  Hepatic/GI Normal                 Musculoskeletal:  Arthritis               Neurological:  Neurology Normal                                      Endocrine:  Diabetes, type 2         Obesity / BMI > 30  Dermatological:  Skin Normal    Psych:  Psychiatric Normal                    Physical Exam  General: Alert and Oriented    Airway:  Mallampati: II   Mouth Opening: Normal  TM Distance: Normal  Tongue: Normal  Neck ROM: Normal ROM    Dental:  Dentures    Chest/Lungs:  Clear to auscultation, Normal Respiratory Rate    Heart:  Rate: Normal  Rhythm: Regular Rhythm        Anesthesia Plan  Type of Anesthesia, risks & benefits discussed:    Anesthesia Type: Gen Natural Airway  Intra-op Monitoring Plan: Standard ASA Monitors  Post Op Pain Control Plan: multimodal analgesia  Induction:  IV  Informed Consent: Informed consent signed with the Patient and all parties understand the risks and agree with anesthesia plan.  All questions answered. Patient consented to blood products? No  ASA Score: 3  Day of Surgery Review of History & Physical: H&P Update referred to the surgeon/provider.    Ready For Surgery From Anesthesia Perspective.     .

## 2023-12-07 ENCOUNTER — HOSPITAL ENCOUNTER (OUTPATIENT)
Facility: HOSPITAL | Age: 68
Discharge: HOME | End: 2023-12-07
Attending: INTERNAL MEDICINE | Admitting: INTERNAL MEDICINE
Payer: MEDICARE

## 2023-12-07 ENCOUNTER — ANESTHESIA (OUTPATIENT)
Dept: ENDOSCOPY | Facility: HOSPITAL | Age: 68
End: 2023-12-07
Payer: MEDICARE

## 2023-12-07 VITALS
HEART RATE: 63 BPM | RESPIRATION RATE: 19 BRPM | BODY MASS INDEX: 33.13 KG/M2 | HEIGHT: 66 IN | DIASTOLIC BLOOD PRESSURE: 78 MMHG | OXYGEN SATURATION: 100 % | TEMPERATURE: 98 F | SYSTOLIC BLOOD PRESSURE: 138 MMHG | WEIGHT: 206.13 LBS

## 2023-12-07 DIAGNOSIS — Z12.11 COLON CANCER SCREENING: ICD-10-CM

## 2023-12-07 LAB — POCT GLUCOSE: 92 MG/DL (ref 70–110)

## 2023-12-07 PROCEDURE — 37000009 HC ANESTHESIA EA ADD 15 MINS: Performed by: INTERNAL MEDICINE

## 2023-12-07 PROCEDURE — 63600175 PHARM REV CODE 636 W HCPCS: Performed by: INTERNAL MEDICINE

## 2023-12-07 PROCEDURE — 45385 COLONOSCOPY W/LESION REMOVAL: CPT | Mod: PT | Performed by: INTERNAL MEDICINE

## 2023-12-07 PROCEDURE — 63600175 PHARM REV CODE 636 W HCPCS: Performed by: NURSE ANESTHETIST, CERTIFIED REGISTERED

## 2023-12-07 PROCEDURE — D9220A PRA ANESTHESIA: Mod: PT,,, | Performed by: NURSE ANESTHETIST, CERTIFIED REGISTERED

## 2023-12-07 PROCEDURE — 45385 COLONOSCOPY W/LESION REMOVAL: CPT | Mod: PT,,, | Performed by: INTERNAL MEDICINE

## 2023-12-07 PROCEDURE — 45385 PR COLONOSCOPY,REMV LESN,SNARE: ICD-10-PCS | Mod: PT,,, | Performed by: INTERNAL MEDICINE

## 2023-12-07 PROCEDURE — 27201089 HC SNARE, DISP (ANY): Performed by: INTERNAL MEDICINE

## 2023-12-07 PROCEDURE — 37000008 HC ANESTHESIA 1ST 15 MINUTES: Performed by: INTERNAL MEDICINE

## 2023-12-07 PROCEDURE — 88305 TISSUE EXAM BY PATHOLOGIST: CPT | Mod: 26,,, | Performed by: PATHOLOGY

## 2023-12-07 PROCEDURE — 88305 TISSUE EXAM BY PATHOLOGIST: CPT | Performed by: PATHOLOGY

## 2023-12-07 PROCEDURE — 25000003 PHARM REV CODE 250: Performed by: NURSE ANESTHETIST, CERTIFIED REGISTERED

## 2023-12-07 PROCEDURE — D9220A PRA ANESTHESIA: ICD-10-PCS | Mod: PT,,, | Performed by: NURSE ANESTHETIST, CERTIFIED REGISTERED

## 2023-12-07 PROCEDURE — 88305 TISSUE EXAM BY PATHOLOGIST: ICD-10-PCS | Mod: 26,,, | Performed by: PATHOLOGY

## 2023-12-07 RX ORDER — PROPOFOL 10 MG/ML
VIAL (ML) INTRAVENOUS
Status: DISCONTINUED | OUTPATIENT
Start: 2023-12-07 | End: 2023-12-07

## 2023-12-07 RX ORDER — SODIUM CHLORIDE, SODIUM LACTATE, POTASSIUM CHLORIDE, CALCIUM CHLORIDE 600; 310; 30; 20 MG/100ML; MG/100ML; MG/100ML; MG/100ML
INJECTION, SOLUTION INTRAVENOUS CONTINUOUS
Status: DISCONTINUED | OUTPATIENT
Start: 2023-12-07 | End: 2023-12-07 | Stop reason: HOSPADM

## 2023-12-07 RX ORDER — LIDOCAINE HYDROCHLORIDE 10 MG/ML
INJECTION, SOLUTION EPIDURAL; INFILTRATION; INTRACAUDAL; PERINEURAL
Status: DISCONTINUED | OUTPATIENT
Start: 2023-12-07 | End: 2023-12-07

## 2023-12-07 RX ADMIN — SODIUM CHLORIDE, POTASSIUM CHLORIDE, SODIUM LACTATE AND CALCIUM CHLORIDE: 600; 310; 30; 20 INJECTION, SOLUTION INTRAVENOUS at 10:12

## 2023-12-07 RX ADMIN — PROPOFOL 20 MG: 10 INJECTION, EMULSION INTRAVENOUS at 10:12

## 2023-12-07 RX ADMIN — PROPOFOL 100 MG: 10 INJECTION, EMULSION INTRAVENOUS at 10:12

## 2023-12-07 RX ADMIN — LIDOCAINE HYDROCHLORIDE 40 MG: 10 INJECTION, SOLUTION EPIDURAL; INFILTRATION; INTRACAUDAL; PERINEURAL at 10:12

## 2023-12-07 NOTE — DISCHARGE SUMMARY
[facility] - Endoscopy 1st Fl  Discharge Note  Short Stay    Procedure(s) (LRB):  COLONOSCOPY (N/A)      OUTCOME: Patient tolerated treatment/procedure well without complication and is now ready for discharge.    DISPOSITION: Home or Self Care    FINAL DIAGNOSIS:  Colon cancer screening    FOLLOWUP: With primary care provider    DISCHARGE INSTRUCTIONS:  No discharge procedures on file.     TIME SPENT ON DISCHARGE: 20 minutes

## 2023-12-07 NOTE — ANESTHESIA POSTPROCEDURE EVALUATION
Anesthesia Post Evaluation    Patient: Lacey Calderón    Procedure(s) Performed: Procedure(s) (LRB):  COLONOSCOPY (N/A)    Final Anesthesia Type: general      Patient location during evaluation: PACU  Patient participation: Yes- Able to Participate  Level of consciousness: awake and alert and oriented  Post-procedure vital signs: reviewed and stable  Pain management: adequate  Airway patency: patent    PONV status at discharge: No PONV  Anesthetic complications: no      Cardiovascular status: blood pressure returned to baseline, stable and hemodynamically stable  Respiratory status: unassisted  Hydration status: euvolemic  Follow-up not needed.              Vitals Value Taken Time   /78 12/07/23 1113   Temp 36.6 °C (97.8 °F) 12/07/23 1055   Pulse 63 12/07/23 1116   Resp 24 12/07/23 1116   SpO2 100 % 12/07/23 1116   Vitals shown include unvalidated device data.      No case tracking events are documented in the log.      Pain/Silas Score: Silas Score: 8 (12/7/2023 10:56 AM)

## 2023-12-07 NOTE — PROVATION PATIENT INSTRUCTIONS
Discharge Summary/Instructions after an Endoscopic Procedure  Patient Name: Lacey Calderón  Patient MRN: [id number]  Patient YOB: 1955 Thursday, December 7, 2023  Natahn Mendoza MD  Dear patient,  As a result of recent federal legislation (The Federal Cures Act), you may   receive lab or pathology results from your procedure in your [facility]   account before your physician is able to contact you. Your physician or   their representative will relay the results to you with their   recommendations at their soonest availability.  Thank you,  RESTRICTIONS:  During your procedure today, you received medications for sedation.  These   medications may affect your judgment, balance and coordination.  Therefore,   for 24 hours, you have the following restrictions:   - DO NOT drive a car, operate machinery, make legal/financial decisions,   sign important papers or drink alcohol.    ACTIVITY:  Today: no heavy lifting, straining or running due to procedural   sedation/anesthesia.  The following day: return to full activity including work.  DIET:  Eat and drink normally unless instructed otherwise.     TREATMENT FOR COMMON SIDE EFFECTS:  - Mild abdominal pain, nausea, belching, bloating or excessive gas:  rest,   eat lightly and use a heating pad.  - Sore Throat: treat with throat lozenges and/or gargle with warm salt   water.  - Because air was used during the procedure, expelling large amounts of air   from your rectum or belching is normal.  - If a bowel prep was taken, you may not have a bowel movement for 1-3 days.    This is normal.  SYMPTOMS TO WATCH FOR AND REPORT TO YOUR PHYSICIAN:  1. Abdominal pain or bloating, other than gas cramps.  2. Chest pain.  3. Back pain.  4. Signs of infection such as: chills or fever occurring within 24 hours   after the procedure.  5. Rectal bleeding, which would show as bright red, maroon, or black stools.   (A tablespoon of blood from the rectum is not serious,  especially if   hemorrhoids are present.)  6. Vomiting.  7. Weakness or dizziness.  GO DIRECTLY TO THE NEAREST EMERGENCY ROOM IF YOU HAVE ANY OF THE FOLLOWING:      Difficulty breathing              Chills and/or fever over 101 F   Persistent vomiting and/or vomiting blood   Severe abdominal pain   Severe chest pain   Black, tarry stools   Bleeding- more than one tablespoon   Any other symptom or condition that you feel may need urgent attention  Your doctor recommends these additional instructions:  If any biopsies were taken, your doctors clinic will contact you in 1 to 2   weeks with any results.  - Discharge patient to home.   - Resume previous diet.   - Continue present medications.   - Await pathology results.   - Repeat colonoscopy in 1 year because the bowel preparation was suboptimal.     - Return to referring physician as previously scheduled.   - Patient has a contact number available for emergencies.  The signs and   symptoms of potential delayed complications were discussed with the   patient.  Return to normal activities tomorrow.  Written discharge   instructions were provided to the patient.  For questions, problems or results please call your physician Nathan Mendoza MD at Work:  [phone]  If you have any questions about the above instructions, call the GI   department at [phone] or call the endoscopy unit at [phone]   from 7am until 3 pm.  [facility], EMERGENCY ROOM PHONE NUMBER:   [phone]  IF A COMPLICATION OR EMERGENCY SITUATION ARISES AND YOU ARE UNABLE TO REACH   YOUR PHYSICIAN - GO DIRECTLY TO THE EMERGENCY ROOM.  I have read or have had read to me these discharge instructions for my   procedure and have received a written copy.  I understand these   instructions and will follow-up with my physician if I have any questions.     __________________________________       _____________________________________  Nurse Signature                                           Patient/Designated   Responsible Party Signature  MD Nathan Lmea MD  12/7/2023 10:56:20 AM  This report has been verified and signed electronically.  Dear patient,  As a result of recent federal legislation (The Federal Cures Act), you may   receive lab or pathology results from your procedure in your [facility]   account before your physician is able to contact you. Your physician or   their representative will relay the results to you with their   recommendations at their soonest availability.  Thank you,  PROVATION

## 2023-12-07 NOTE — H&P
Endoscopy History and Physical    PCP - SURINDER Merrill MD  Referring Physician - SURINDER Merrill MD  [id number] [place] [address]      ASA - per anesthesia  Mallampati - per anesthesia  History of Anesthesia problems - no  Family history Anesthesia problems -  no   Plan of anesthesia - General    HPI  68 y.o. female    Planned Procedure: Colonoscopy  Diagnosis: screening for colon cancer  Chief Complaint: Same as above    Personnel H/o colon polyps:no  FH of colon cancer:no  Anticoagulation:no      ROS:  Constitutional: No fevers, chills, No weight loss  CV: No chest pain  Pulm: No cough, No shortness of breath  GI: see HPI    Medical History:  has a past medical history of Arthritis, Cardiac arrhythmia (11/13/2020), Cataract, Class 2 severe obesity due to excess calories with serious comorbidity and body mass index (BMI) of 37.0 to 37.9 in adult (10/19/2020), Essential hypertension (10/19/2020), Hyperlipidemia complicating type 2 diabetes (10/24/2020), Hypertension, Paroxysmal atrial fibrillation (11/13/2020), Primary localized osteoarthritis of knees, bilateral (10/19/2020), Pure hypercholesterolemia (10/24/2020), Pure hypercholesterolemia (10/24/2020), Type 2 diabetes mellitus with hyperglycemia, with long-term current use of insulin (6/3/2021), and Type 2 diabetes mellitus with other specified complication (6/3/2021).    Surgical History:  has a past surgical history that includes Multiple tooth extractions.    Family History: family history includes Arthritis in her mother; Cataracts in her mother; Dementia in her mother; Glaucoma in her mother; Heart disease in her mother; Miscarriages / Stillbirths in her mother; No Known Problems in her father..    Social History:  reports that she has never smoked. She has never used smokeless tobacco. She reports that she does not drink alcohol and does not use drugs.    Review of patient's allergies indicates:  No Known Allergies    Medications:  "  Medications Prior to Admission   Medication Sig Dispense Refill Last Dose    amLODIPine (NORVASC) 10 MG tablet Take 1 tablet (10 mg total) by mouth once daily. 90 tablet 3 12/6/2023    apixaban (ELIQUIS) 5 mg Tab Take 1 tablet (5 mg total) by mouth 2 (two) times daily. 180 tablet 3 Past Week    blood sugar diagnostic Strp Check blood glucose 4 times daily (BEFORE breakfast or other meal) and as needed for symptoms of low or high blood glucose 200 each 11 12/6/2023    carvediloL (COREG) 6.25 MG tablet Take 1 tablet (6.25 mg total) by mouth 2 (two) times daily. 180 tablet 2 12/6/2023    dorzolamide-timolol 2-0.5% (COSOPT) 22.3-6.8 mg/mL ophthalmic solution Place 1 drop into both eyes every 12 (twelve) hours. 30 mL 6 12/6/2023    ibuprofen (ADVIL,MOTRIN) 600 MG tablet Take 600 mg by mouth 3 (three) times daily.   12/6/2023    insulin lispro 100 unit/mL pen INJECT 9 UNITS SUBCUTANEOUSLY THREE TIMES DAILY BEFORE MEAL(S) 15 mL 5 12/6/2023    LANTUS SOLOSTAR U-100 INSULIN glargine 100 units/mL SubQ pen INJECT 30 UNITS SUBCUTANEOUSLY NIGHTLY 30 mL 2 12/6/2023    metFORMIN (GLUCOPHAGE-XR) 500 MG ER 24hr tablet Take 1 tablet (500 mg total) by mouth once daily. 90 tablet 3 12/6/2023    potassium chloride SA (K-DUR,KLOR-CON M) 10 MEQ tablet Take 1 tablet (10 mEq total) by mouth once daily. 90 tablet 3 12/6/2023    rosuvastatin (CRESTOR) 20 MG tablet Take 1 tablet (20 mg total) by mouth every evening. 90 tablet 3 12/6/2023    valsartan (DIOVAN) 320 MG tablet Take 1 tablet (320 mg total) by mouth once daily. 90 tablet 3 12/6/2023    BD ULTRA-FINE SHORT PEN NEEDLE 31 gauge x 5/16" Ndle USE AS DIRECTED TO INJECT INSULIN AS PRESCRIBED BY [facility] PROVIDER 200 each 2     blood-glucose meter kit Check blood glucose 4 times daily (BEFORE breakfast or other meal) and as needed for symptoms of low or high blood glucose 1 each 0     calcium carbonate-vitamin D3 (CALCIUM 600 WITH VITAMIN D3) 600 mg-12.5 mcg (500 unit) Cap Take by mouth 2 " (two) times a day.       chlorthalidone (HYGROTEN) 25 MG Tab Take 1 tablet (25 mg total) by mouth once daily. 90 tablet 2     DIPH,PERTUSS,ACEL,TET-VAC,(ADACEL,TDAP ADOLESN/ADULT,PF)2 Lf-(2.5-5-3-5 mcg)-5Lf/0.5 mL Syrg        empty container (SHARPS CONTAINER) Misc Use to dispose sharps       lancets Misc Check blood glucose 4 times daily (BEFORE breakfast or other meal) and as needed for symptoms of low or high blood glucose 200 each 11     latanoprost 0.005 % ophthalmic solution Place into both eyes.       vitamin D (VITAMIN D3) 1000 units Tab Take 1,000 Units by mouth once daily.          Physical Exam:    Vital Signs:   Vitals:    12/07/23 0943   BP: (!) 171/94   Pulse: 79   Resp: 16   Temp: 97.7 °F (36.5 °C)       General Appearance: Well appearing in no acute distress  Abdomen: Soft, non tender, non distended with normal bowel sounds, no masses    Labs:  Lab Results   Component Value Date    WBC 5.67 02/09/2023    HGB 12.5 02/09/2023    HCT 39.3 02/09/2023     02/09/2023    CHOL 217 (H) 07/06/2023    TRIG 80 07/06/2023    HDL 63 07/06/2023    ALT 12 07/06/2023    AST 19 07/06/2023     07/06/2023    K 4.0 07/06/2023     07/06/2023    CREATININE 0.8 07/06/2023    BUN 17 07/06/2023    CO2 26 07/06/2023    TSH 2.405 06/03/2021    HGBA1C 5.4 07/06/2023       I have explained the risks and benefits of this endoscopic procedure to the patient including but not limited to bleeding, inflammation, infection, perforation, and death.    SEDATION PLAN: per anesthesia       History reviewed, vital signs satisfactory, cardiopulmonary status satisfactory, sedation options, risks and plans have been discussed with the patient  All their questions were answered and the patient agrees to the sedation procedures as planned and the patient is deemed an appropriate candidate for the sedation as planned.     The risks, benefits and alternatives of the procedure were discussed with the patient in detail. This  discussion was had in the presence of endoscopy staff. The risks include, risks of adverse reaction to sedation requiring the use of reversal agents, bleeding requiring blood transfusion, perforation requiring surgical intervention and technical failure. Other risks include aspiration leading to respiratory distress and respiratory failure resulting in endotracheal intubation and mechanical ventilation including death. If anesthesia is being utilized for this procedure, it is up to the anesthesiologist to determine airway safety including elective endotracheal intubation. Questions were answered, they agree to proceed. There was no language barriers.       Procedure explained to patient, informed consent obtained and placed in chart.       Nathan Mendoza MD

## 2023-12-07 NOTE — TRANSFER OF CARE
"Anesthesia Transfer of Care Note    Patient: Lacey Calderón    Procedure(s) Performed: Procedure(s) (LRB):  COLONOSCOPY (N/A)    Patient location: PACU    Anesthesia Type: general    Transport from OR: Transported from OR on room air with adequate spontaneous ventilation    Post pain: adequate analgesia    Post assessment: no apparent anesthetic complications    Post vital signs: stable    Level of consciousness: sedated and responds to stimulation    Nausea/Vomiting: no nausea/vomiting    Complications: none    Transfer of care protocol was followed      Last vitals: Visit Vitals  BP (!) 171/94 (BP Location: Right arm, Patient Position: Sitting)   Pulse 79   Temp 36.5 °C (97.7 °F) (Temporal)   Resp 16   Ht 5' 6" (1.676 m)   Wt 93.5 kg (206 lb 2.1 oz)   SpO2 100%   Breastfeeding No   BMI 33.27 kg/m²     "

## 2023-12-13 LAB
FINAL PATHOLOGIC DIAGNOSIS: NORMAL
Lab: NORMAL

## 2023-12-14 ENCOUNTER — TELEPHONE (OUTPATIENT)
Dept: GASTROENTEROLOGY | Facility: CLINIC | Age: 68
End: 2023-12-14
Payer: MEDICARE

## 2023-12-14 NOTE — TELEPHONE ENCOUNTER
----- Message from Nathan Mendoza MD sent at 12/13/2023  8:56 AM CST -----  Please let the patient know that the colon polyp(s) removed showed adenomatous tissue. No further action is needed at this point.     Adenomatous polyps are the most common of the classic neoplastic polyps.   About two-thirds of all colonic polyps are adenomas.   Adenomas are by definition dysplastic and thus have malignant potential.   Most colorectal cancers arise from adenomas, but only a small minority of adenomas progress to cancer (5 percent or less).   Studies reporting the average age at presentation of patients with adenomatous polyps versus colorectal cancer suggest the time for development of adenomas to cancer is about 7 to 10 years.   The risk of progression is higher for advanced adenomas.

## 2023-12-14 NOTE — TELEPHONE ENCOUNTER
Called the pt and explained the results of her colonoscopy.  The pt understands and has no questions at this time.

## 2024-03-05 DIAGNOSIS — Z79.4 TYPE 2 DIABETES MELLITUS WITH MICROALBUMINURIA, WITH LONG-TERM CURRENT USE OF INSULIN: Chronic | ICD-10-CM

## 2024-03-05 DIAGNOSIS — E11.29 TYPE 2 DIABETES MELLITUS WITH MICROALBUMINURIA, WITH LONG-TERM CURRENT USE OF INSULIN: Chronic | ICD-10-CM

## 2024-03-05 DIAGNOSIS — R80.9 TYPE 2 DIABETES MELLITUS WITH MICROALBUMINURIA, WITH LONG-TERM CURRENT USE OF INSULIN: Chronic | ICD-10-CM

## 2024-03-05 RX ORDER — PEN NEEDLE, DIABETIC 31 GX5/16"
NEEDLE, DISPOSABLE MISCELLANEOUS
Qty: 200 EACH | Refills: 3 | Status: SHIPPED | OUTPATIENT
Start: 2024-03-05

## 2024-03-05 NOTE — TELEPHONE ENCOUNTER
Care Due:                  Date            Visit Type   Department     Provider  --------------------------------------------------------------------------------                                EP -                              PRIMARY      [facility]  Last Visit: 08-      CARE ([facility])   MEDICINE       Feliciano Merrill  Next Visit: None Scheduled  None         None Found                                                            Last  Test          Frequency    Reason                     Performed    Due Date  --------------------------------------------------------------------------------    HBA1C.......  6 months...  LANTUS, insulin,           07- 01-                             metFORMIN................    [employer] Embedded Care Due Messages. Reference number: [id number].   3/05/2024 5:25:56 PM CST

## 2024-03-06 NOTE — TELEPHONE ENCOUNTER
Provider Staff:  Action required for this patient    Requires labs      Please see care gap opportunities below in Care Due Message.    Thanks!  [facility]     Appointments      Date Provider   Last Visit   8/17/2023 SURINDER Merrill MD   Next Visit   Visit date not found SURINDER Merrill MD     Refill Decision Note   Lacey Calderón  is requesting a refill authorization.  Brief Assessment and Rationale for Refill:  Approve     Medication Therapy Plan:         Comments:     Note composed:10:06 PM 03/05/2024

## 2024-04-05 ENCOUNTER — PATIENT MESSAGE (OUTPATIENT)
Dept: ADMINISTRATIVE | Facility: HOSPITAL | Age: 69
End: 2024-04-05
Payer: MEDICARE

## 2024-04-18 DIAGNOSIS — I15.2 HYPERTENSION COMPLICATING DIABETES: Chronic | ICD-10-CM

## 2024-04-18 DIAGNOSIS — E11.59 HYPERTENSION COMPLICATING DIABETES: Chronic | ICD-10-CM

## 2024-04-18 DIAGNOSIS — I48.0 PAROXYSMAL ATRIAL FIBRILLATION: Primary | Chronic | ICD-10-CM

## 2024-06-04 DIAGNOSIS — I10 ESSENTIAL HYPERTENSION: Chronic | ICD-10-CM

## 2024-06-05 RX ORDER — AMLODIPINE BESYLATE 10 MG/1
10 TABLET ORAL
Qty: 90 TABLET | Refills: 3 | Status: SHIPPED | OUTPATIENT
Start: 2024-06-05

## 2024-06-05 RX ORDER — APIXABAN 5 MG/1
5 TABLET, FILM COATED ORAL 2 TIMES DAILY
Qty: 180 TABLET | Refills: 3 | Status: SHIPPED | OUTPATIENT
Start: 2024-06-05

## 2024-07-05 ENCOUNTER — PATIENT MESSAGE (OUTPATIENT)
Dept: ADMINISTRATIVE | Facility: HOSPITAL | Age: 69
End: 2024-07-05
Payer: MEDICARE

## 2024-07-12 ENCOUNTER — PATIENT OUTREACH (OUTPATIENT)
Dept: ADMINISTRATIVE | Facility: HOSPITAL | Age: 69
End: 2024-07-12
Payer: MEDICARE

## 2024-07-12 NOTE — PROGRESS NOTES
VBHM Score: 3     Urine Screening  Hemoglobin A1c  Lipid Panel    RSV Vaccine                  Health Maintenance Topic(s) Outreach Outcomes & Actions Taken:    Lab(s) - Outreach Outcomes & Actions Taken  : Overdue       Additional Notes:  Attempted to contact pt, no ans, no vm set up.  Pt has not accessed my chart since 3.6.21. Pt is due for annual visit 8.17.24               Care Management, Digital Medicine, and/or Education Referrals    OPCM Risk Score: 45.2         Next Steps - Referral Actions: No ans, no vm

## 2024-07-17 DIAGNOSIS — E11.9 TYPE 2 DIABETES MELLITUS WITHOUT COMPLICATION: ICD-10-CM

## 2024-08-08 ENCOUNTER — TELEPHONE (OUTPATIENT)
Dept: OPHTHALMOLOGY | Facility: CLINIC | Age: 69
End: 2024-08-08
Payer: MEDICARE

## 2024-08-09 ENCOUNTER — OFFICE VISIT (OUTPATIENT)
Dept: OPHTHALMOLOGY | Facility: CLINIC | Age: 69
End: 2024-08-09
Payer: MEDICARE

## 2024-08-09 DIAGNOSIS — Z91.81 HISTORY OF RECENT FALL: Primary | ICD-10-CM

## 2024-08-09 DIAGNOSIS — H25.011 CORTICAL SENILE CATARACT OF RIGHT EYE: ICD-10-CM

## 2024-08-09 DIAGNOSIS — H25.012 CORTICAL SENILE CATARACT OF LEFT EYE: ICD-10-CM

## 2024-08-09 DIAGNOSIS — H40.1131 PRIMARY OPEN ANGLE GLAUCOMA (POAG) OF BOTH EYES, MILD STAGE: ICD-10-CM

## 2024-08-09 PROCEDURE — 99999 PR PBB SHADOW E&M-EST. PATIENT-LVL III: CPT | Mod: PBBFAC,,, | Performed by: OPHTHALMOLOGY

## 2024-08-09 RX ORDER — CEPHALEXIN 500 MG/1
500 CAPSULE ORAL 4 TIMES DAILY
COMMUNITY

## 2024-08-09 RX ORDER — DORZOLAMIDE HYDROCHLORIDE AND TIMOLOL MALEATE 20; 5 MG/ML; MG/ML
1 SOLUTION/ DROPS OPHTHALMIC EVERY 12 HOURS
Qty: 30 ML | Refills: 6 | Status: SHIPPED | OUTPATIENT
Start: 2024-08-09

## 2024-08-16 ENCOUNTER — E-CONSULT (OUTPATIENT)
Dept: CARDIOLOGY | Facility: CLINIC | Age: 69
End: 2024-08-16
Payer: MEDICARE

## 2024-08-16 ENCOUNTER — OFFICE VISIT (OUTPATIENT)
Dept: OPHTHALMOLOGY | Facility: CLINIC | Age: 69
End: 2024-08-16
Payer: MEDICARE

## 2024-08-16 ENCOUNTER — DOCUMENTATION ONLY (OUTPATIENT)
Dept: OPHTHALMOLOGY | Facility: CLINIC | Age: 69
End: 2024-08-16

## 2024-08-16 DIAGNOSIS — Z01.810 PRE-OPERATIVE CARDIOVASCULAR EXAMINATION: Primary | ICD-10-CM

## 2024-08-16 DIAGNOSIS — I48.0 PAROXYSMAL ATRIAL FIBRILLATION: ICD-10-CM

## 2024-08-16 DIAGNOSIS — H25.011 CORTICAL SENILE CATARACT OF RIGHT EYE: ICD-10-CM

## 2024-08-16 DIAGNOSIS — H40.1131 PRIMARY OPEN ANGLE GLAUCOMA (POAG) OF BOTH EYES, MILD STAGE: Primary | ICD-10-CM

## 2024-08-16 DIAGNOSIS — H25.012 CORTICAL SENILE CATARACT OF LEFT EYE: ICD-10-CM

## 2024-08-16 PROCEDURE — 99999 PR PBB SHADOW E&M-EST. PATIENT-LVL IV: CPT | Mod: PBBFAC,,, | Performed by: OPHTHALMOLOGY

## 2024-08-16 RX ORDER — KETOROLAC TROMETHAMINE 5 MG/ML
1 SOLUTION OPHTHALMIC 4 TIMES DAILY
Qty: 5 ML | Refills: 0 | Status: SHIPPED | OUTPATIENT
Start: 2024-08-16 | End: 2025-08-16

## 2024-08-16 RX ORDER — PREDNISOLONE ACETATE 10 MG/ML
1 SUSPENSION/ DROPS OPHTHALMIC 4 TIMES DAILY
Qty: 5 ML | Refills: 1 | Status: SHIPPED | OUTPATIENT
Start: 2024-08-16

## 2024-08-16 NOTE — PROGRESS NOTES
HPI     Cataract            Comments: Patient states VA has decreased in OD>OS at all ranges over the   last several months, images appear dull and cloudy along with glare   sensitivity in the bright sunlight glare.        Here for Simon, book surgery and dilate OS only  Lab Results       Component                Value               Date                       HGBA1C                   5.4                 07/06/2023                     Comments    DMII 5/2021   HTN  CATARACTS  SCOAG by c/d and IOP  Gcl : 29-26--3/30/2022    Fam H/O Glaucoma - Mom    Cosopt BID OU             Last edited by Yessica David, Patient Care Assistant on 8/16/2024  8:44   AM.      Dictation #1  MRN:[id number]  CSN:[id number]       Assessment /Plan     For exam results, see Encounter Report.      ICD-10-CM ICD-9-CM    1. Primary open angle glaucoma (POAG) of both eyes, mild stage  H40.1131 365.11 Doing well - intraocular pressure is within acceptable range relative to target pressure with no evidence of progression.   Continue current treatment.  Reviewed importance of continued compliance with treatment and follow up.       Will not proceed with phaco/migs due to current Eliquis use**     365.71       2. Cortical senile cataract of left eye  H25.012 366.15 Ambulatory Referral to External Surgery      IOL Master - OS - Left Eye      prednisoLONE acetate (PRED FORTE) 1 % DrpS      ketorolac 0.5% (ACULAR) 0.5 % Drop    Visually Significant Cataract OS  Patient reports decreased vision in the fellow eye consistent with the clinical amount of lenticular opacity, which reaches the level of visual significance and affects activities of daily living including reading and glare. Risks, benefits, and alternatives to cataract surgery were discussed and patient desired to schedule cataract surgery. Patient was consented and the biometry and lens options were reviewed.    Discussed IOL options and refractive outcomes for this patient.    Topography  Reviewed: Yes  History of Refractive Surgery: No     Phaco left eye, +20.0 SY60WF  Vision blue   Topical  Monofocal - Distance  Prescriptions sent for preoperative medications  Prednisolone Acetate- 4xs daily  and Ketorolac- 4xs daily   Anticoagulant status: Yes - Eliquis  E- consult sent to Dr Whitaker to d/c prior to surgery    Explained that patient may need glasses after surgery.  Discussed that vision may be limited by: Glaucoma & astigmatism    **pt declined toric/MF implant**    Referral to [facility] for Ophthalmic [facility]    Schedule post op visit #2 with MGM      3. Cortical senile cataract of right eye  H25.011 366.15 Monitor at this time

## 2024-08-16 NOTE — CONSULTS
[facility] - Cardiology  Response for E-Consult     Patient Name: Lacey Calderón  MRN: [id number]  Primary Care Provider: SURINDER Merrill MD   Requesting Provider: Tanner Haque MD  E-Consult to Cardiology  Consult performed by: Jj Shaffer MD  Consult ordered by: Tanner Haque MD        Chart reviewed personally.      No evidence seen in Epic of DCCV within the last month.    As long as no significant chest pain or shortness of breath with exertion, patient is at acceptable risk to proceed from a Cardiology standpoint.    Okay to hold Eliquis 48 hours prior to procedure, restart as soon as safe postprocedure.    Thank you for this consult.    Contingency that warrants a repeat eConsult or referral: NA    Total time of Consultation: 5 minute    I did not speak to the requesting provider verbally about this.     *This eConsult is based on the clinical data available to me and is furnished without benefit of a physical examination. The eConsult will need to be interpreted in light of any clinical issues or changes in patient status not available to me at the time of filing this eConsults. Significant changes in patient condition or level of acuity should result in immediate formal consultation and reevaluation. Please alert me if you have further questions.    Thank you for this eConsult referral.     Jj Shaffer MD  [facility] Cardiology

## 2024-08-16 NOTE — PROGRESS NOTES
Short Stay Record    CC: Blurry Vision     Impression: Visually significant cataract with reasonable expectation for visual improvement Left Eye    Base Eye Exam    Visual Acuity (Snellen - Linear)     Right Left   Dist sc 20/25 20/30     Tonometry (ICARE, 8:51 AM)     Right Left   Pressure 10 10    Pupils     Pupils Dark Light Shape React APD   Right PERRL 3.5 3 Round Minimal None   Left PERRL 3.5 3 Round Minimal None     Visual Fields (Counting fingers)     Right Left    Full Full     Extraocular Movement     Right Left    Full Full     Neuro/Psych    Oriented x3: Yes   Mood/Affect: Normal     Dilation    Left eye: 1% Mydriacyl, 2.5% Phenylephrine @ 8:51 AM      Edited by: Yessica David, Patient Care Assistant; Tanner Haque MD        Additional Tests      Glare Testing (rom lighting)     Medium   Right 20/70   Left 20/80   Edited by: Yessica David, Patient Care Assistant        Slit Lamp and Fundus Exam      External Exam     Right Left   External bandage on temporal right brow, 2+ Ptosis Normal     Slit Lamp Exam     Right Left   Lids/Lashes Normal Normal   Conjunctiva/Sclera White and quiet White and quiet   Cornea Arcus Arcus   Anterior Chamber Deep and quiet Deep and quiet   Iris Round and reactive Round and reactive   Lens 3+ Cortical cataract 2+ Cortical cataract, Trace Nuclear sclerosis   Vitreous Normal Normal     Fundus Exam     Right Left   Disc Pink rim throughout, deep Inferior-temporal thinning   C/D Ratio 0.5 0.6   Macula no diabetic retinopathy no diabetic retinopathy   Vessels Normal Normal   Periphery No Diabetic Retinopathy No Diabetic Retinopathy   Edited by: Tanner Haque MD        Refraction      Manifest Refraction     Sphere Cylinder Axis Dist VA   Right +0.75 Sphere  20/25   Left +0.75 +1.25 170 20/30   Edited by: Yessica David Patient Care Assistant       Planned Procedure:   Topography Reviewed: Yes  History of Refractive Surgery: No      Phaco left eye, +20.0  SY60WF  Vision blue   Topical  Monofocal - Distance  Prescriptions sent for preoperative medications  Prednisolone Acetate- 4xs daily  and Ketorolac- 4xs daily   Anticoagulant status: Yes - Eliquis  E- consult sent to Dr Whitaker to d/c prior to surgery          Lens Selection OS verified _____             Previous IOL OD  N/A    Patient cleared for ophthalmic surgery.     Discharge Summary:    Admitting Diagnosis: Cortical  OS    Discharge Diagnosis: Pseudophakia OS    Procedure: Phacoemulsification of cataract with intraocular lens implant OS    Complications: None  Discharge Condition: Stable    Discharge instructions: Follow post-operative discharge instruction sheet given by provider.    Follow-up: Return to clinic next day or as scheduled.       HPI:  Lacey Calderón is a 69 y.o. female who presents for evaluation prior to ophthalmic surgery, left eye. Patient reports of blurred vision at distance and near with and without correction. Patient reports of glare at night reducing function and safety, and complains of needed additional light to read.     Past Medical History:   Diagnosis Date    Arthritis     Cardiac arrhythmia 11/13/2020    Cataract     Class 2 severe obesity due to excess calories with serious comorbidity and body mass index (BMI) of 37.0 to 37.9 in adult 10/19/2020    Essential hypertension 10/19/2020    Hyperlipidemia complicating type 2 diabetes 10/24/2020    Hypertension     Paroxysmal atrial fibrillation 11/13/2020    Primary localized osteoarthritis of knees, bilateral 10/19/2020    Pure hypercholesterolemia 10/24/2020    Pure hypercholesterolemia 10/24/2020    Type 2 diabetes mellitus with hyperglycemia, with long-term current use of insulin 6/3/2021    Type 2 diabetes mellitus with other specified complication 6/3/2021     Past Surgical History:   Procedure Laterality Date    COLONOSCOPY N/A 12/7/2023    Procedure: COLONOSCOPY;  Surgeon: Nathan Mendoza MD;  Location: [facility];   "Service: Endoscopy;  Laterality: N/A;    MULTIPLE TOOTH EXTRACTIONS       Review of patient's allergies indicates:  No Known Allergies    Current Outpatient Medications:     amLODIPine (NORVASC) 10 MG tablet, Take 1 tablet by mouth once daily, Disp: 90 tablet, Rfl: 3    apixaban (ELIQUIS) 5 mg Tab, Take 1 tablet by mouth twice daily, Disp: 180 tablet, Rfl: 3    BD ULTRA-FINE SHORT PEN NEEDLE 31 gauge x 5/16" Ndle, USE AS DIRECTED TO  INJECT  INSULIN  AS  PRESCRIBED  BY  [facility]  PROVIDER, Disp: 200 each, Rfl: 3    blood sugar diagnostic Strp, Check blood glucose 4 times daily (BEFORE breakfast or other meal) and as needed for symptoms of low or high blood glucose, Disp: 200 each, Rfl: 11    blood-glucose meter kit, Check blood glucose 4 times daily (BEFORE breakfast or other meal) and as needed for symptoms of low or high blood glucose, Disp: 1 each, Rfl: 0    calcium carbonate-vitamin D3 (CALCIUM 600 WITH VITAMIN D3) 600 mg-12.5 mcg (500 unit) Cap, Take by mouth 2 (two) times a day., Disp: , Rfl:     carvediloL (COREG) 6.25 MG tablet, Take 1 tablet (6.25 mg total) by mouth 2 (two) times daily., Disp: 180 tablet, Rfl: 2    cephALEXin (KEFLEX) 500 MG capsule, Take 500 mg by mouth 4 (four) times daily., Disp: , Rfl:     chlorthalidone (HYGROTEN) 25 MG Tab, Take 1 tablet (25 mg total) by mouth once daily., Disp: 90 tablet, Rfl: 2    DIPH,PERTUSS,ACEL,TET-VAC,(ADACEL,TDAP ADOLESN/ADULT,PF)2 Lf-(2.5-5-3-5 mcg)-5Lf/0.5 mL Syrg, , Disp: , Rfl:     dorzolamide-timolol 2-0.5% (COSOPT) 22.3-6.8 mg/mL ophthalmic solution, Place 1 drop into both eyes every 12 (twelve) hours., Disp: 30 mL, Rfl: 6    empty container (SHARPS CONTAINER) Misc, Use to dispose sharps, Disp: , Rfl:     ibuprofen (ADVIL,MOTRIN) 600 MG tablet, Take 600 mg by mouth 3 (three) times daily., Disp: , Rfl:     insulin lispro 100 unit/mL pen, INJECT 9 UNITS SUBCUTANEOUSLY THREE TIMES DAILY BEFORE MEAL(S), Disp: 15 mL, Rfl: 5    ketorolac 0.5% (ACULAR) 0.5 % " Drop, Place 1 drop into the left eye 4 (four) times daily., Disp: 5 mL, Rfl: 0    lancets Misc, Check blood glucose 4 times daily (BEFORE breakfast or other meal) and as needed for symptoms of low or high blood glucose, Disp: 200 each, Rfl: 11    LANTUS SOLOSTAR U-100 INSULIN glargine 100 units/mL SubQ pen, INJECT 30 UNITS SUBCUTANEOUSLY NIGHTLY, Disp: 30 mL, Rfl: 2    latanoprost 0.005 % ophthalmic solution, Place into both eyes., Disp: , Rfl:     metFORMIN (GLUCOPHAGE-XR) 500 MG ER 24hr tablet, Take 1 tablet (500 mg total) by mouth once daily., Disp: 90 tablet, Rfl: 3    potassium chloride SA (K-DUR,KLOR-CON M) 10 MEQ tablet, Take 1 tablet (10 mEq total) by mouth once daily., Disp: 90 tablet, Rfl: 3    prednisoLONE acetate (PRED FORTE) 1 % DrpS, Place 1 drop into the left eye 4 (four) times daily., Disp: 5 mL, Rfl: 1    rosuvastatin (CRESTOR) 20 MG tablet, Take 1 tablet (20 mg total) by mouth every evening., Disp: 90 tablet, Rfl: 3    valsartan (DIOVAN) 320 MG tablet, Take 1 tablet (320 mg total) by mouth once daily., Disp: 90 tablet, Rfl: 3    vitamin D (VITAMIN D3) 1000 units Tab, Take 1,000 Units by mouth once daily., Disp: , Rfl:     Review of Systems:  A comprehensive review of systems was negative.    Physical Exam:  General Appearance:    A&Ox3, no distress, appears stated age   Head:    Normocephalic, without obvious abnormality, atraumatic   Eyes:    PERRL, EOM's intact   Back:     Symmetric, no curvature   Lungs:     Respirations unlabored   Chest Wall:    No tenderness or deformity    Heart:  Abdomen:  Extremities:  Skin:    S1 and S2 present    Soft, non-tender    Extremities normal, atraumatic    Skin color, texture, turgor normal

## 2024-08-25 DIAGNOSIS — Z79.4 TYPE 2 DIABETES MELLITUS WITH OTHER SPECIFIED COMPLICATION, WITH LONG-TERM CURRENT USE OF INSULIN: Chronic | ICD-10-CM

## 2024-08-25 DIAGNOSIS — E11.29 TYPE 2 DIABETES MELLITUS WITH MICROALBUMINURIA, WITH LONG-TERM CURRENT USE OF INSULIN: Chronic | ICD-10-CM

## 2024-08-25 DIAGNOSIS — R80.9 TYPE 2 DIABETES MELLITUS WITH MICROALBUMINURIA, WITH LONG-TERM CURRENT USE OF INSULIN: Chronic | ICD-10-CM

## 2024-08-25 DIAGNOSIS — Z79.4 TYPE 2 DIABETES MELLITUS WITH MICROALBUMINURIA, WITH LONG-TERM CURRENT USE OF INSULIN: Chronic | ICD-10-CM

## 2024-08-25 DIAGNOSIS — E11.69 TYPE 2 DIABETES MELLITUS WITH OTHER SPECIFIED COMPLICATION, WITH LONG-TERM CURRENT USE OF INSULIN: Chronic | ICD-10-CM

## 2024-08-25 NOTE — TELEPHONE ENCOUNTER
Refill Routing Note   Medication(s) are not appropriate for processing by [facility] for the following reason(s):        Required labs outdated    [facility] action(s):  Defer   Requires appointment : Yes     Requires labs : Yes             Appointments  past 12m or future 3m with PCP    Date Provider   Last Visit   8/17/2023 SURINDER Merrill MD   Next Visit   Visit date not found SURINDER Merrill MD   ED visits in past 90 days: 0        Note composed:1:40 PM 08/25/2024

## 2024-08-25 NOTE — TELEPHONE ENCOUNTER
Care Due:                  Date            Visit Type   Department     Provider  --------------------------------------------------------------------------------                                EP -                              PRIMARY      [facility] INTERNAL  Last Visit: 08-      [facility] ([facility]   MEDICINE       Feliciano Merrill  Next Visit: None Scheduled  None         None Found                                                            Last  Test          Frequency    Reason                     Performed    Due Date  --------------------------------------------------------------------------------    Office Visit  15 months..  LANTUS, carvediloL,        08- 11-                             chlorthalidone, insulin,                             metFORMIN, potassium,                             rosuvastatin, valsartan..    CMP.........  12 months..  LANTUS, chlorthalidone,    07- 07-                             insulin, metFORMIN,                             potassium, rosuvastatin,                             valsartan................    HBA1C.......  6 months...  LANTUS, insulin,           07- 01-                             metFORMIN................    Lipid Panel.  12 months..  rosuvastatin.............  07- 07-    [facility] Embedded Care Due Messages. Reference number: [id number].   8/25/2024 1:14:54 PM CDT

## 2024-08-27 RX ORDER — INSULIN LISPRO 100 [IU]/ML
INJECTION, SOLUTION INTRAVENOUS; SUBCUTANEOUS
Qty: 15 ML | Refills: 0 | OUTPATIENT
Start: 2024-08-27

## 2024-09-03 ENCOUNTER — OFFICE VISIT (OUTPATIENT)
Dept: INTERNAL MEDICINE | Facility: CLINIC | Age: 69
End: 2024-09-03
Payer: MEDICARE

## 2024-09-03 VITALS
BODY MASS INDEX: 32.71 KG/M2 | DIASTOLIC BLOOD PRESSURE: 84 MMHG | HEIGHT: 66 IN | TEMPERATURE: 98 F | SYSTOLIC BLOOD PRESSURE: 120 MMHG | RESPIRATION RATE: 18 BRPM | HEART RATE: 85 BPM | OXYGEN SATURATION: 99 % | WEIGHT: 203.5 LBS

## 2024-09-03 DIAGNOSIS — Z79.4 TYPE 2 DIABETES MELLITUS WITH HYPERGLYCEMIA, WITH LONG-TERM CURRENT USE OF INSULIN: Chronic | ICD-10-CM

## 2024-09-03 DIAGNOSIS — E78.5 HYPERLIPIDEMIA ASSOCIATED WITH TYPE 2 DIABETES MELLITUS: Chronic | ICD-10-CM

## 2024-09-03 DIAGNOSIS — E78.00 PURE HYPERCHOLESTEROLEMIA: Chronic | ICD-10-CM

## 2024-09-03 DIAGNOSIS — E11.69 TYPE 2 DIABETES MELLITUS WITH OTHER SPECIFIED COMPLICATION, WITHOUT LONG-TERM CURRENT USE OF INSULIN: Chronic | ICD-10-CM

## 2024-09-03 DIAGNOSIS — E11.65 TYPE 2 DIABETES MELLITUS WITH HYPERGLYCEMIA, WITH LONG-TERM CURRENT USE OF INSULIN: Chronic | ICD-10-CM

## 2024-09-03 DIAGNOSIS — Z01.818 PRE-OPERATIVE CLEARANCE: Primary | ICD-10-CM

## 2024-09-03 DIAGNOSIS — E11.69 HYPERLIPIDEMIA ASSOCIATED WITH TYPE 2 DIABETES MELLITUS: Chronic | ICD-10-CM

## 2024-09-03 DIAGNOSIS — Z79.4 TYPE 2 DIABETES MELLITUS WITH OTHER SPECIFIED COMPLICATION, WITH LONG-TERM CURRENT USE OF INSULIN: Chronic | ICD-10-CM

## 2024-09-03 DIAGNOSIS — E11.69 TYPE 2 DIABETES MELLITUS WITH OTHER SPECIFIED COMPLICATION, WITH LONG-TERM CURRENT USE OF INSULIN: Chronic | ICD-10-CM

## 2024-09-03 DIAGNOSIS — Z79.4 TYPE 2 DIABETES MELLITUS WITH MICROALBUMINURIA, WITH LONG-TERM CURRENT USE OF INSULIN: Chronic | ICD-10-CM

## 2024-09-03 DIAGNOSIS — E11.29 TYPE 2 DIABETES MELLITUS WITH MICROALBUMINURIA, WITH LONG-TERM CURRENT USE OF INSULIN: Chronic | ICD-10-CM

## 2024-09-03 DIAGNOSIS — R80.9 TYPE 2 DIABETES MELLITUS WITH MICROALBUMINURIA, WITH LONG-TERM CURRENT USE OF INSULIN: Chronic | ICD-10-CM

## 2024-09-03 DIAGNOSIS — I10 ESSENTIAL HYPERTENSION: Chronic | ICD-10-CM

## 2024-09-03 DIAGNOSIS — E11.69 TYPE 2 DIABETES MELLITUS WITH OBESITY: Chronic | ICD-10-CM

## 2024-09-03 DIAGNOSIS — Z29.11 NEED FOR RSV VACCINATION: ICD-10-CM

## 2024-09-03 DIAGNOSIS — E66.9 TYPE 2 DIABETES MELLITUS WITH OBESITY: Chronic | ICD-10-CM

## 2024-09-03 LAB
BILIRUB UR QL STRIP: ABNORMAL
CLARITY UR: CLEAR
COLOR UR: YELLOW
GLUCOSE UR QL STRIP: NEGATIVE
HGB UR QL STRIP: NEGATIVE
KETONES UR QL STRIP: ABNORMAL
LEUKOCYTE ESTERASE UR QL STRIP: NEGATIVE
NITRITE UR QL STRIP: NEGATIVE
PH UR STRIP: 6 [PH] (ref 5–8)
PROT UR QL STRIP: NEGATIVE
SP GR UR STRIP: >=1.03 (ref 1–1.03)
URN SPEC COLLECT METH UR: ABNORMAL

## 2024-09-03 PROCEDURE — 81003 URINALYSIS AUTO W/O SCOPE: CPT | Performed by: NURSE PRACTITIONER

## 2024-09-03 PROCEDURE — G2211 COMPLEX E/M VISIT ADD ON: HCPCS | Mod: S$GLB,,, | Performed by: NURSE PRACTITIONER

## 2024-09-03 PROCEDURE — 99999 PR PBB SHADOW E&M-EST. PATIENT-LVL V: CPT | Mod: PBBFAC,,, | Performed by: NURSE PRACTITIONER

## 2024-09-03 PROCEDURE — 1100F PTFALLS ASSESS-DOCD GE2>/YR: CPT | Mod: CPTII,S$GLB,, | Performed by: NURSE PRACTITIONER

## 2024-09-03 PROCEDURE — 1125F AMNT PAIN NOTED PAIN PRSNT: CPT | Mod: CPTII,S$GLB,, | Performed by: NURSE PRACTITIONER

## 2024-09-03 PROCEDURE — 4010F ACE/ARB THERAPY RXD/TAKEN: CPT | Mod: CPTII,S$GLB,, | Performed by: NURSE PRACTITIONER

## 2024-09-03 PROCEDURE — 1159F MED LIST DOCD IN RCRD: CPT | Mod: CPTII,S$GLB,, | Performed by: NURSE PRACTITIONER

## 2024-09-03 PROCEDURE — 3008F BODY MASS INDEX DOCD: CPT | Mod: CPTII,S$GLB,, | Performed by: NURSE PRACTITIONER

## 2024-09-03 PROCEDURE — 3079F DIAST BP 80-89 MM HG: CPT | Mod: CPTII,S$GLB,, | Performed by: NURSE PRACTITIONER

## 2024-09-03 PROCEDURE — 3288F FALL RISK ASSESSMENT DOCD: CPT | Mod: CPTII,S$GLB,, | Performed by: NURSE PRACTITIONER

## 2024-09-03 PROCEDURE — 1160F RVW MEDS BY RX/DR IN RCRD: CPT | Mod: CPTII,S$GLB,, | Performed by: NURSE PRACTITIONER

## 2024-09-03 PROCEDURE — 3074F SYST BP LT 130 MM HG: CPT | Mod: CPTII,S$GLB,, | Performed by: NURSE PRACTITIONER

## 2024-09-03 PROCEDURE — 99214 OFFICE O/P EST MOD 30 MIN: CPT | Mod: S$GLB,,, | Performed by: NURSE PRACTITIONER

## 2024-09-03 RX ORDER — METFORMIN HYDROCHLORIDE 500 MG/1
500 TABLET, EXTENDED RELEASE ORAL DAILY
Qty: 90 TABLET | Refills: 3 | Status: SHIPPED | OUTPATIENT
Start: 2024-09-03

## 2024-09-03 RX ORDER — LANCETS
EACH MISCELLANEOUS
Qty: 200 EACH | Refills: 11 | Status: SHIPPED | OUTPATIENT
Start: 2024-09-03

## 2024-09-03 RX ORDER — VALSARTAN 320 MG/1
320 TABLET ORAL DAILY
Qty: 90 TABLET | Refills: 3 | Status: SHIPPED | OUTPATIENT
Start: 2024-09-03 | End: 2025-09-03

## 2024-09-03 RX ORDER — PEN NEEDLE, DIABETIC 30 GX3/16"
NEEDLE, DISPOSABLE MISCELLANEOUS
Qty: 200 EACH | Refills: 3 | Status: SHIPPED | OUTPATIENT
Start: 2024-09-03

## 2024-09-03 RX ORDER — CHLORTHALIDONE 25 MG/1
25 TABLET ORAL DAILY
Qty: 90 TABLET | Refills: 2 | Status: SHIPPED | OUTPATIENT
Start: 2024-09-03

## 2024-09-03 RX ORDER — POTASSIUM CHLORIDE 750 MG/1
10 TABLET, EXTENDED RELEASE ORAL DAILY
Qty: 90 TABLET | Refills: 3 | Status: SHIPPED | OUTPATIENT
Start: 2024-09-03

## 2024-09-03 RX ORDER — CARVEDILOL 6.25 MG/1
6.25 TABLET ORAL 2 TIMES DAILY
Qty: 180 TABLET | Refills: 2 | Status: SHIPPED | OUTPATIENT
Start: 2024-09-03

## 2024-09-03 RX ORDER — INSULIN PUMP SYRINGE, 3 ML
EACH MISCELLANEOUS
Qty: 1 EACH | Refills: 0 | Status: SHIPPED | OUTPATIENT
Start: 2024-09-03

## 2024-09-03 RX ORDER — PEN NEEDLE, DIABETIC 30 GX3/16"
NEEDLE, DISPOSABLE MISCELLANEOUS
Qty: 200 EACH | Refills: 3 | Status: SHIPPED | OUTPATIENT
Start: 2024-09-03 | End: 2024-09-03

## 2024-09-03 RX ORDER — INSULIN LISPRO 100 [IU]/ML
INJECTION, SOLUTION INTRAVENOUS; SUBCUTANEOUS
Qty: 15 ML | Refills: 5 | Status: SHIPPED | OUTPATIENT
Start: 2024-09-03

## 2024-09-03 RX ORDER — ROSUVASTATIN CALCIUM 20 MG/1
20 TABLET, COATED ORAL NIGHTLY
Qty: 90 TABLET | Refills: 3 | Status: SHIPPED | OUTPATIENT
Start: 2024-09-03 | End: 2025-09-03

## 2024-09-03 NOTE — PROGRESS NOTES
Subjective:      Patient ID: Lacey Calderón is a 69 y.o. female.    Chief Complaint: [facility]op Exam [facility] pt scheduled to have cataract surgery on lt eye on  09/12/24  with [facility] )    History of Present Illness    CHIEF COMPLAINT:  Lacey presents today for preoperative evaluation for cataract surgery and medication refills.    UPCOMING CATARACT SURGERY:  She is scheduled for left eye cataract surgery next Thursday (12th) with Dr. Javier Haque. She plans to stop Eliquis for three days prior to surgery and restart on the fourth day post-surgery, as done for previous procedures. She is awaiting preoperative paperwork and plans to consult with her cardiologist, Dr. Tyson, regarding the temporary discontinuation of Eliquis.    DIABETES MANAGEMENT:  She reports needing an insulin refill and requests diabetic testing supplies. Her recent A1C was 5.7, which is well-controlled. She expresses interest in using her newly G7 Dexcom continuous glucose monitor to help with blood sugar management, as she sometimes forgets to check her blood sugar. She denies taking insulin on an empty stomach due to nausea.    MEDICATIONS:  She requires an insulin refill. She takes Eliquis, which she temporarily discontinues for procedures. She uses a glucose meter and requests diabetic strips for random blood sugar checks.    WEIGHT MANAGEMENT:  She reports a current weight of 203 lbs, down from 220 lbs, expressing pride in her weight loss.    DIET:  She reports recent dietary indulgences at a family party, including baked chicken, salad, barbecue pork ribs, and sausage. She demonstrates efforts to control portion sizes and sugar intake by diluting regular soda and adding water to regular coffee when decaf is unavailable.    URINARY SYMPTOMS:  She reports variable urinary frequency. Nocturia has improved to 1-2 times per night, compared to previous 3-4 times. She denies dysuria, burning, or  itching with urination.    MEDICAL HISTORY:  She has controlled hypertension, well-managed diabetes, and a history of high cholesterol. She denies any history of heart disease, kidney problems, liver problems, circulation problems, or cancer. She reports no known medication allergies.    SURGICAL HISTORY:  She reports a history of dental procedures requiring temporary discontinuation of Eliquis.    REPRODUCTIVE HISTORY:  She experienced menarche at age 9 and menopause at age 45.    FAMILY HISTORY:  She reports a family history of cancer, though specific details were not provided.    SOCIAL HISTORY:  She has three children (ages 43, 41, and 38) and twelve grandchildren, mostly girls.    BOWEL HABITS:  She reports regular bowel movements every two days without problems.    VACCINATIONS:  She needs a tetanus vaccine and a COVID-19 booster. She is scheduled for a flu vaccine today and agrees to get an RSV vaccine at the pharmacy.    ROS:  General: -fever, -chills, -fatigue, -weight gain, -weight loss  Eyes: -vision changes, -redness, -discharge  ENT: -ear pain, -nasal congestion, -sore throat  Cardiovascular: -chest pain, -palpitations, -lower extremity edema  Respiratory: -cough, -shortness of breath  Gastrointestinal: -abdominal pain, +nausea, -vomiting, -diarrhea, -constipation, -blood in stool, -change in bowel habits  Genitourinary: -dysuria, -hematuria, +frequency, +nocturia  Musculoskeletal: -joint pain, -muscle pain  Skin: -rash, -lesion  Neurological: -headache, -dizziness, -numbness, -tingling  Psychiatric: -anxiety, -depression, -sleep difficulty          Patient Active Problem List   Diagnosis    Primary localized osteoarthritis of knees, bilateral    Obesity complicating type 2 diabetes mellitus    Essential hypertension    Chronic pain of both knees    Hyperlipidemia complicating type 2 diabetes    Paroxysmal atrial fibrillation    Type 2 diabetes mellitus with other specified complication, with chronic  insulin use    Diuretic-induced hypokalemia    Primary osteoarthritis of both knees    Elevated CPK    Hyperaldosteronism    EKG abnormalities    Colon cancer screening         Current Outpatient Medications:     amLODIPine (NORVASC) 10 MG tablet, Take 1 tablet by mouth once daily, Disp: 90 tablet, Rfl: 3    apixaban (ELIQUIS) 5 mg Tab, Take 1 tablet by mouth twice daily, Disp: 180 tablet, Rfl: 3    calcium carbonate-vitamin D3 (CALCIUM 600 WITH VITAMIN D3) 600 mg-12.5 mcg (500 unit) Cap, Take by mouth 2 (two) times a day., Disp: , Rfl:     dorzolamide-timolol 2-0.5% (COSOPT) 22.3-6.8 mg/mL ophthalmic solution, Place 1 drop into both eyes every 12 (twelve) hours., Disp: 30 mL, Rfl: 6    empty container (SHARPS CONTAINER) Misc, Use to dispose sharps, Disp: , Rfl:     ibuprofen (ADVIL,MOTRIN) 600 MG tablet, Take 600 mg by mouth 3 (three) times daily., Disp: , Rfl:     ketorolac 0.5% (ACULAR) 0.5 % Drop, Place 1 drop into the left eye 4 (four) times daily., Disp: 5 mL, Rfl: 0    LANTUS SOLOSTAR U-100 INSULIN glargine 100 units/mL SubQ pen, INJECT 30 UNITS SUBCUTANEOUSLY NIGHTLY, Disp: 30 mL, Rfl: 2    latanoprost 0.005 % ophthalmic solution, Place into both eyes., Disp: , Rfl:     prednisoLONE acetate (PRED FORTE) 1 % DrpS, Place 1 drop into the left eye 4 (four) times daily., Disp: 5 mL, Rfl: 1    blood sugar diagnostic Strp, Check blood glucose 4 times daily (BEFORE breakfast or other meal) and as needed for symptoms of low or high blood glucose, Disp: 200 each, Rfl: 11    blood-glucose meter kit, Check blood glucose 4 times daily (BEFORE breakfast or other meal) and as needed for symptoms of low or high blood glucose, Disp: 1 each, Rfl: 0    carvediloL (COREG) 6.25 MG tablet, Take 1 tablet (6.25 mg total) by mouth 2 (two) times daily., Disp: 180 tablet, Rfl: 2    chlorthalidone (HYGROTEN) 25 MG Tab, Take 1 tablet (25 mg total) by mouth once daily., Disp: 90 tablet, Rfl: 2    DIPH,PERTUSS,ACEL,TET-VAC,(ADACEL,TDAP  "ADOLESN/ADULT,PF)2 Lf-(2.5-5-3-5 mcg)-5Lf/0.5 mL Syrg, , Disp: , Rfl:     insulin lispro 100 unit/mL pen, INJECT 9 UNITS SUBCUTANEOUSLY THREE TIMES DAILY BEFORE MEAL(S), Disp: 15 mL, Rfl: 5    lancets Misc, Check blood glucose 4 times daily (BEFORE breakfast or other meal) and as needed for symptoms of low or high blood glucose, Disp: 200 each, Rfl: 11    metFORMIN (GLUCOPHAGE-XR) 500 MG ER 24hr tablet, Take 1 tablet (500 mg total) by mouth once daily., Disp: 90 tablet, Rfl: 3    pen needle, diabetic (BD ULTRA-FINE SHORT PEN NEEDLE) 31 gauge x 5/16" Ndle, USE AS DIRECTED TO  INJECT  INSULIN  AS  PRESCRIBED  BY  [facility]  PROVIDER Strength: 31 gauge x 5/16", Disp: 200 each, Rfl: 3    potassium chloride SA (K-DUR,KLOR-CON M) 10 MEQ tablet, Take 1 tablet (10 mEq total) by mouth once daily., Disp: 90 tablet, Rfl: 3    rosuvastatin (CRESTOR) 20 MG tablet, Take 1 tablet (20 mg total) by mouth every evening., Disp: 90 tablet, Rfl: 3    RSVPreF3 antigen-AS01E, PF, (AREXVY) 120 mcg/0.5 mL SusR vaccine, Pharmacist inject 0.5 mL intramuscular for one dose according to CDC guidance and pharmacy protocol., Disp: 0.5 mL, Rfl: 0    valsartan (DIOVAN) 320 MG tablet, Take 1 tablet (320 mg total) by mouth once daily., Disp: 90 tablet, Rfl: 3    vitamin D (VITAMIN D3) 1000 units Tab, Take 1,000 Units by mouth once daily. (Patient not taking: Reported on 9/3/2024), Disp: , Rfl:       Objective:   /84 (BP Location: Right arm, Patient Position: Sitting, BP Method: Medium (Manual))   Pulse 85   Temp 98 °F (36.7 °C) (Tympanic)   Resp 18   Ht 5' 6" (1.676 m)   Wt 92.3 kg (203 lb 7.8 oz)   SpO2 99%   BMI 32.84 kg/m²     Physical Exam    General: In no acute distress.  Head: Normocephalic. Non traumatic.  Eyes: PERRLA. EOMs full. Conjunctivae clear. Fundi grossly normal.  Ears: EACs clear. TMs normal.  Nose: Mucosa pink. Mucosa moist. No obstruction.  Throat: Clear. No exudates. No lesions.  Neck: Supple. No masses. No " thyromegaly. No bruits.  Chest: Lungs clear. No rales. No rhonchi. No wheezes. Clear lungs.  Heart: RRR. No murmurs. No rubs. No gallops.  Abdomen: Soft. No tenderness. No masses. BS normal.  : Normal external genitalia. No lesions. No discharge. No hernias  noted.  Back: Normal curvature. No scoliosis. No tenderness.  Extremities: Warm. Well perfused. No upper extremity edema. No lower extremity edema. FROM. No deformities. No joint erythema.  Neuro: No focal deficits appreciated. Good muscle tone. Normal response to visual stimuli. Normal response to auditory stimuli.  Skin: Normal. No rashes. No lesions noted.  Vitals: WEIGHT: 203 LBS.          Assessment:     1. Pre-operative clearance    2. Essential hypertension    3. Hyperlipidemia complicating type 2 diabetes    4. Type 2 diabetes mellitus with other specified complication, without long-term current use of insulin    5. Obesity complicating type 2 diabetes mellitus    6. Type 2 diabetes mellitus with microalbuminuria, with long-term current use of insulin    7. Type 2 diabetes mellitus with hyperglycemia, with long-term current use of insulin    8. Type 2 diabetes mellitus with other specified complication, with long-term current use of insulin    9. Pure hypercholesterolemia    10. Need for RSV vaccination      Plan:   Assessment & Plan    Reviewed patient's history of hypertension and diabetes, both currently well-controlled  Assessed need for preoperative clearance for upcoming cataract surgery  Considered patient's request for continuous glucose monitoring (G7 Compass)  Evaluated recent weight loss, noting improvement from 220 to 203 lbs  Performed foot exam due to patient's diabetic status  Reviewed current medication regimen, including Eliquis, insulin, Valsartan, Metformin, potassium, and Crestor  PREOP CLEARANCE  - Patient is noted at being moderate risk for surgery   - Patient to hold her Eliquis for 48 hours prior to surgery per Cardiologist  recommendation and resume taking Eliquis as soon as safe postprocedure  DIABETES:  - Educated patient on the importance of daily foot exams for diabetics to detect early signs of complications.  - Lacey to check feet nightly before bed to monitor for any changes or injuries.  - Continued current medication regimen including insulin, Metformin.  - Refilled diabetic supplies including needles, diabetic strips, and glucose meter.  - Hemoglobin A1C test ordered.  - Foot exam performed - WNL.  RSV VACCINATION:  - Provided information on RSV vaccine and its importance for older adults.  - Lacey to get RSV vaccine at the pharmacy.  HYPERTENSION:  - Continued current medication regimen including Valsartan.  HYPERLIPIDEMIA:  - Continued current medication regimen including Crestor.  ELECTROLYTE IMBALANCE:  - Continued current medication regimen including potassium.  RSV VACCINATION:  - RSV vaccine sent to pharmacy.          Follow up if symptoms worsen or fail to improve.

## 2024-09-23 DIAGNOSIS — R80.9 TYPE 2 DIABETES MELLITUS WITH MICROALBUMINURIA, WITH LONG-TERM CURRENT USE OF INSULIN: Chronic | ICD-10-CM

## 2024-09-23 DIAGNOSIS — E11.29 TYPE 2 DIABETES MELLITUS WITH MICROALBUMINURIA, WITH LONG-TERM CURRENT USE OF INSULIN: Chronic | ICD-10-CM

## 2024-09-23 DIAGNOSIS — Z79.4 TYPE 2 DIABETES MELLITUS WITH OTHER SPECIFIED COMPLICATION, WITH LONG-TERM CURRENT USE OF INSULIN: Chronic | ICD-10-CM

## 2024-09-23 DIAGNOSIS — E11.69 TYPE 2 DIABETES MELLITUS WITH OTHER SPECIFIED COMPLICATION, WITH LONG-TERM CURRENT USE OF INSULIN: Chronic | ICD-10-CM

## 2024-09-23 DIAGNOSIS — Z79.4 TYPE 2 DIABETES MELLITUS WITH MICROALBUMINURIA, WITH LONG-TERM CURRENT USE OF INSULIN: Chronic | ICD-10-CM

## 2024-09-23 NOTE — TELEPHONE ENCOUNTER
Refill Routing Note   Medication(s) are not appropriate for processing by [facility] for the following reason(s):        Required labs outdated    [facility] action(s):  Defer     Requires labs : Yes             Appointments  past 12m or future 3m with PCP    Date Provider   Last Visit   Visit date not found SURINDER Merrill MD   Next Visit   12/3/2024 SURINDER Merrill MD   ED visits in past 90 days: 0        Note composed:2:47 PM 09/23/2024

## 2024-09-23 NOTE — TELEPHONE ENCOUNTER
Care Due:                  Date            Visit Type   Department     Provider  --------------------------------------------------------------------------------                                EP -                              PRIMARY      [facility] INTERNAL  Last Visit: 08-      CARE ([facility])   VELMA Merrill                              EP -                              PRIMARY      [facility] INTERNAL  Next Visit: 12-      CARE ([facility])   VELMA Merrill                                                            Last  Test          Frequency    Reason                     Performed    Due Date  --------------------------------------------------------------------------------    Micaela.........  12 months..  LANTUS...................  07- 07-    [facility] Embedded Care Due Messages. Reference number: [id number].   9/23/2024 5:35:41 AM CDT

## 2024-09-24 ENCOUNTER — TELEPHONE (OUTPATIENT)
Dept: INTERNAL MEDICINE | Facility: CLINIC | Age: 69
End: 2024-09-24
Payer: MEDICARE

## 2024-09-24 RX ORDER — INSULIN GLARGINE 100 [IU]/ML
INJECTION, SOLUTION SUBCUTANEOUS
Qty: 30 ML | Refills: 0 | OUTPATIENT
Start: 2024-09-24

## 2024-09-24 NOTE — TELEPHONE ENCOUNTER
----- Message from Itzel Hurtado sent at 9/24/2024  8:28 AM CDT -----  Contact: Lacey  Type:  Patient Returning Call    Who Called: Lacey   Who Left Message for Patient:Nurse Davida Chacon MA  Does the patient know what this is regarding?: LANTUS SOLOSTAR U-100 INSULIN 100 unit/mL (3 mL) InPn pen [Pharmacy Med Name: Lantus SoloStar 100 UNIT/ML Subcutaneous Solution Pen-injector]   Would the patient rather a call back or a response via [facility]? Call back   Best Call Back Number: [phone]   Additional Information:

## 2024-09-25 ENCOUNTER — TELEPHONE (OUTPATIENT)
Dept: OPHTHALMOLOGY | Facility: CLINIC | Age: 69
End: 2024-09-25
Payer: MEDICARE

## 2024-09-25 NOTE — TELEPHONE ENCOUNTER
Spoke with patient, will postpone cat sx due to covid diagnosis. Patient will call to reschedule at a later date.

## 2024-10-17 ENCOUNTER — PATIENT OUTREACH (OUTPATIENT)
Dept: ADMINISTRATIVE | Facility: HOSPITAL | Age: 69
End: 2024-10-17
Payer: MEDICARE

## 2024-10-17 DIAGNOSIS — Z71.89 COMPLEX CARE COORDINATION: ICD-10-CM

## 2024-10-17 DIAGNOSIS — Z12.31 ENCOUNTER FOR SCREENING MAMMOGRAM FOR BREAST CANCER: Primary | ICD-10-CM

## 2024-10-17 NOTE — PROGRESS NOTES
[facility] OUTREACH: per chart pt is overdue for Mammogram, pt agreed to scheduling same day as PCP appt, overdue for DM labs, appt scheduled same day as OB GYN appt, will send Dr Merrill a message informing him labs will be done prior to her office visit with him incase he wants add'l labs ordered.

## 2024-10-17 NOTE — Clinical Note
Fuentes Merrill,   This pt has a ofc visit with you on 12.3.24, I have scheduled her Fasting labs to be done 10.23.24 before a appt she already had scheduled with OB, Ha1c/Lipid/Micro has been linked, do you recommend she have any add'l labs? If so please let me know and I will link to that lab appt.  Thanks, Jesica GOETZ, [facility] Care Coordination Department [facility]

## 2024-10-21 ENCOUNTER — PATIENT OUTREACH (OUTPATIENT)
Dept: ADMINISTRATIVE | Facility: HOSPITAL | Age: 69
End: 2024-10-21
Payer: MEDICARE

## 2024-10-23 ENCOUNTER — NUTRITION (OUTPATIENT)
Dept: DIABETES | Facility: CLINIC | Age: 69
End: 2024-10-23
Payer: MEDICARE

## 2024-10-23 ENCOUNTER — OFFICE VISIT (OUTPATIENT)
Dept: OBSTETRICS AND GYNECOLOGY | Facility: CLINIC | Age: 69
End: 2024-10-23
Payer: MEDICARE

## 2024-10-23 ENCOUNTER — LAB VISIT (OUTPATIENT)
Dept: LAB | Facility: HOSPITAL | Age: 69
End: 2024-10-23
Attending: FAMILY MEDICINE
Payer: MEDICARE

## 2024-10-23 VITALS
DIASTOLIC BLOOD PRESSURE: 82 MMHG | BODY MASS INDEX: 32.45 KG/M2 | HEIGHT: 66 IN | WEIGHT: 201.94 LBS | SYSTOLIC BLOOD PRESSURE: 122 MMHG

## 2024-10-23 VITALS — WEIGHT: 201.94 LBS | BODY MASS INDEX: 32.59 KG/M2

## 2024-10-23 DIAGNOSIS — N76.0 ACUTE VAGINITIS: ICD-10-CM

## 2024-10-23 DIAGNOSIS — E11.69 TYPE 2 DIABETES MELLITUS WITH OTHER SPECIFIED COMPLICATION, WITH LONG-TERM CURRENT USE OF INSULIN: Chronic | ICD-10-CM

## 2024-10-23 DIAGNOSIS — Z01.419 ENCOUNTER FOR ANNUAL ROUTINE GYNECOLOGICAL EXAMINATION: Primary | ICD-10-CM

## 2024-10-23 DIAGNOSIS — E11.9 TYPE 2 DIABETES MELLITUS WITHOUT COMPLICATION: ICD-10-CM

## 2024-10-23 DIAGNOSIS — E11.65 TYPE 2 DIABETES MELLITUS WITH HYPERGLYCEMIA, WITH LONG-TERM CURRENT USE OF INSULIN: Chronic | ICD-10-CM

## 2024-10-23 DIAGNOSIS — Z11.3 SCREEN FOR STD (SEXUALLY TRANSMITTED DISEASE): ICD-10-CM

## 2024-10-23 DIAGNOSIS — R80.9 TYPE 2 DIABETES MELLITUS WITH MICROALBUMINURIA, WITH LONG-TERM CURRENT USE OF INSULIN: Chronic | ICD-10-CM

## 2024-10-23 DIAGNOSIS — Z79.4 TYPE 2 DIABETES MELLITUS WITH OTHER SPECIFIED COMPLICATION, WITH LONG-TERM CURRENT USE OF INSULIN: Chronic | ICD-10-CM

## 2024-10-23 DIAGNOSIS — R39.9 UTI SYMPTOMS: ICD-10-CM

## 2024-10-23 DIAGNOSIS — E11.29 TYPE 2 DIABETES MELLITUS WITH MICROALBUMINURIA, WITH LONG-TERM CURRENT USE OF INSULIN: Chronic | ICD-10-CM

## 2024-10-23 DIAGNOSIS — Z79.4 TYPE 2 DIABETES MELLITUS WITH MICROALBUMINURIA, WITH LONG-TERM CURRENT USE OF INSULIN: Chronic | ICD-10-CM

## 2024-10-23 DIAGNOSIS — Z79.4 TYPE 2 DIABETES MELLITUS WITH HYPERGLYCEMIA, WITH LONG-TERM CURRENT USE OF INSULIN: Chronic | ICD-10-CM

## 2024-10-23 LAB
CHOLEST SERPL-MCNC: 248 MG/DL (ref 120–199)
CHOLEST/HDLC SERPL: 4.4 {RATIO} (ref 2–5)
ESTIMATED AVG GLUCOSE: 126 MG/DL (ref 68–131)
HBA1C MFR BLD: 6 % (ref 4–5.6)
HDLC SERPL-MCNC: 56 MG/DL (ref 40–75)
HDLC SERPL: 22.6 % (ref 20–50)
LDLC SERPL CALC-MCNC: 172.6 MG/DL (ref 63–159)
NONHDLC SERPL-MCNC: 192 MG/DL
TRIGL SERPL-MCNC: 97 MG/DL (ref 30–150)

## 2024-10-23 PROCEDURE — 3288F FALL RISK ASSESSMENT DOCD: CPT | Mod: CPTII,S$GLB,,

## 2024-10-23 PROCEDURE — 87088 URINE BACTERIA CULTURE: CPT

## 2024-10-23 PROCEDURE — 1160F RVW MEDS BY RX/DR IN RCRD: CPT | Mod: CPTII,S$GLB,,

## 2024-10-23 PROCEDURE — 3079F DIAST BP 80-89 MM HG: CPT | Mod: CPTII,S$GLB,,

## 2024-10-23 PROCEDURE — 3061F NEG MICROALBUMINURIA REV: CPT | Mod: CPTII,S$GLB,,

## 2024-10-23 PROCEDURE — 3008F BODY MASS INDEX DOCD: CPT | Mod: CPTII,S$GLB,,

## 2024-10-23 PROCEDURE — 4010F ACE/ARB THERAPY RXD/TAKEN: CPT | Mod: CPTII,S$GLB,,

## 2024-10-23 PROCEDURE — 80061 LIPID PANEL: CPT | Performed by: FAMILY MEDICINE

## 2024-10-23 PROCEDURE — 3044F HG A1C LEVEL LT 7.0%: CPT | Mod: CPTII,S$GLB,,

## 2024-10-23 PROCEDURE — G0108 DIAB MANAGE TRN  PER INDIV: HCPCS | Mod: S$GLB,,, | Performed by: DIETITIAN, REGISTERED

## 2024-10-23 PROCEDURE — 3074F SYST BP LT 130 MM HG: CPT | Mod: CPTII,S$GLB,,

## 2024-10-23 PROCEDURE — 3066F NEPHROPATHY DOC TX: CPT | Mod: CPTII,S$GLB,,

## 2024-10-23 PROCEDURE — 87086 URINE CULTURE/COLONY COUNT: CPT

## 2024-10-23 PROCEDURE — 0352U VAGINOSIS SCREEN BY DNA PROBE: CPT

## 2024-10-23 PROCEDURE — 99999 PR PBB SHADOW E&M-EST. PATIENT-LVL III: CPT | Mod: PBBFAC,,,

## 2024-10-23 PROCEDURE — 87591 N.GONORRHOEAE DNA AMP PROB: CPT

## 2024-10-23 PROCEDURE — 83036 HEMOGLOBIN GLYCOSYLATED A1C: CPT | Performed by: FAMILY MEDICINE

## 2024-10-23 PROCEDURE — 1101F PT FALLS ASSESS-DOCD LE1/YR: CPT | Mod: CPTII,S$GLB,,

## 2024-10-23 PROCEDURE — 99387 INIT PM E/M NEW PAT 65+ YRS: CPT | Mod: S$GLB,,,

## 2024-10-23 PROCEDURE — 99999 PR PBB SHADOW E&M-EST. PATIENT-LVL III: CPT | Mod: PBBFAC,,, | Performed by: DIETITIAN, REGISTERED

## 2024-10-23 PROCEDURE — 87491 CHLMYD TRACH DNA AMP PROBE: CPT

## 2024-10-23 PROCEDURE — 87186 SC STD MICRODIL/AGAR DIL: CPT | Mod: 59

## 2024-10-23 PROCEDURE — 1159F MED LIST DOCD IN RCRD: CPT | Mod: CPTII,S$GLB,,

## 2024-10-23 NOTE — PROGRESS NOTES
Subjective:       Patient ID: Lacey Calderón is a 69 y.o. female.    Chief Complaint:  painful urination  and vaginal odor      History of Present Illness  HPI  Annual Exam-Postmenopausal  Patient presents for annual exam. The patient has complaints today of vaginal odor and irritation. The patient is sexually active, with multiple partners, using condoms. GYN screening history: last pap: approximate date  and was normal and last mammogram: approximate date 23 and was normal. Annual mammogram today. No history of cervical dysplasia. The patient has never been taking hormone replacement therapy. Patient denies post-menopausal vaginal bleeding. The patient wears seatbelts: yes. The patient participates in regular exercise: not asked. Has the patient ever been transfused or tattooed?: not asked. The patient reports that there is not domestic violence in her life.        GYN & OB History  No LMP recorded. Patient is postmenopausal.   Date of Last Pap: No result found    OB History    Para Term  AB Living   3 3 3         SAB IAB Ectopic Multiple Live Births                  # Outcome Date GA Lbr Darwin/2nd Weight Sex Type Anes PTL Lv   3 Term            2 Term            1 Term                Review of Systems  Review of Systems   Constitutional: Negative.    HENT: Negative.     Eyes: Negative.    Respiratory: Negative.     Cardiovascular: Negative.    Gastrointestinal: Negative.    Genitourinary:  Positive for dysuria, frequency, vaginal discharge, vaginal pain and vaginal odor.   Musculoskeletal: Negative.    Integumentary:  Negative.   Neurological: Negative.    Hematological: Negative.    Psychiatric/Behavioral: Negative.     All other systems reviewed and are negative.  Breast: negative.  Positive for breast self exam.          Objective:      Physical Exam:   Constitutional: She is oriented to person, place, and time. She appears well-developed and well-nourished. No distress.    HENT:   Head:  Normocephalic and atraumatic.    Eyes: Pupils are equal, round, and reactive to light. Conjunctivae and EOM are normal.     Cardiovascular:  Normal rate.             Pulmonary/Chest: Effort normal.        Abdominal: Soft. She exhibits no distension. There is no abdominal tenderness. There is no rebound and no guarding. Hernia confirmed negative in the right inguinal area and confirmed negative in the left inguinal area.     Genitourinary:    Inguinal canal, uterus, right adnexa and left adnexa normal.   Rectum:      No external hemorrhoid.      Pelvic exam was performed with patient supine.   The external female genitalia was normal.   No external genitalia lesions identified,Genitalia hair distrobution normal .     Labial bartholins normal.There is no rash, tenderness, lesion or injury on the right labia. There is no rash, tenderness, lesion or injury on the left labia. Cervix is normal. Right adnexum displays no mass, no tenderness and no fullness. Left adnexum displays no mass, no tenderness and no fullness. There is vaginal discharge and tenderness in the vagina. No erythema or bleeding in the vagina.    No foreign body in the vagina.      No signs of injury in the vagina.   Cervix exhibits no motion tenderness, no lesion, no friability, no tenderness and no polyp. Uerus contour normal  Uterus is not enlarged and not tender. Normal urethral meatus.Urethral Meatus exhibits: no urethral lesionUrethra findings: no urethral mass, no tenderness, no urethral scarring and no prolapsedBladder findings: no bladder distention and no bladder tenderness          Musculoskeletal: Normal range of motion and moves all extremeties.      Lymphadenopathy: No inguinal adenopathy noted on the right or left side.    Neurological: She is alert and oriented to person, place, and time.    Skin: Skin is warm and dry. No rash noted. She is not diaphoretic. No erythema. No pallor.    Psychiatric: She has a normal mood and affect. Her  behavior is normal. Judgment and thought content normal.             Assessment:        1. Encounter for annual routine gynecological examination    2. Acute vaginitis    3. UTI symptoms    4. Screen for STD (sexually transmitted disease)               Plan:   Continue annual well woman exam.  Reviewed updated recommendations for pap smears (no pap smear needed) in patients over 65 with normal pap history.   Patient needs a pelvic exam every year. Reviewed recommendations for annual CBE and annual MMG.        Diagnosis and orders this visit:  Encounter for annual routine gynecological examination    Acute vaginitis  -     Vaginosis Screen by DNA Probe    UTI symptoms  -     CULTURE, URINE    Screen for STD (sexually transmitted disease)  -     C. trachomatis/N. gonorrhoeae by AMP DNA      Marge Rodriguez, BRAN

## 2024-10-23 NOTE — PROGRESS NOTES
Diabetes Care Specialist Progress Note  Author: Jazmyn Thomson RD, CDE  Date: 10/23/2024    Intake    Program Intake  Reason for Diabetes Program Visit:: Initial Diabetes Assessment (DM referral 9/3/24 Ward Tang NP)  Type of Intervention:: Individual  Education: Self-Management Skill Review  Current diabetes risk level:: high  In the last 12 months, have you::  (pt denies ER/hosp visits)    Current Diabetes Treatment:  (Lantus 30units daily. Humalog ac 9units. Metformin xr 500mg 1tab daily.)    Continuous Glucose Monitoring  Patient has CGM: No  Personal CGM type:: Pt in clinic for Dexcom G7 training.    Lab Results   Component Value Date    HGBA1C 5.4 07/06/2023     Weight: 91.6 kg (201 lb 15.1 oz)       Body mass index is 32.59 kg/m².    Lifestyle Coping Support & Clinical    Lifestyle/Coping/Support  Does anyone in your family have diabetes or does anyone in your family support you in your diabetes care?: Pt retired, lives alone and primary caregiver for 2 grandchildren (11yo, 16yo). States no family live nearby.  Learning Barriers:: None  Culture or Adventism beliefs that may impact ability to access healthcare: No  Psychosocial/Coping Skills Assessment Completed: : Yes  Area of need?:  (Will complete at follow-up.)    Problem Review  Active Comorbidities:  (HTN, HLD, BMI 32.59)    Diabetes Self-Management Skills Assessment    Medication Skills Assessment  Patient is able to identify current diabetes medications, dosages, and appropriate timing of medications.: yes  Patient reports problems or concerns with current medication regimen.: no  Patient is  aware that some diabetes medications can cause low blood sugar?: Yes  Medication Skills Assessment Completed:: Yes  Assessment indicates:: Adequate understanding  Area of need?: No    Diabetes Disease Process/Treatment Options  Diabetes Disease Process/Treatment Options: Skills Assessment Completed: No  Deferred due to:: Time  Area of need?:  Deferred    Nutrition/Healthy Eating  Meal Plan 24 Hour Recall - Breakfast: peanut butter and jelly sandwich with a glass of milk  Meal Plan 24 Hour Recall - Lunch: skipped lunch  Meal Plan 24 Hour Recall - Dinner: 3 scoops of rice with red beans with a glass of water  Meal Plan 24 Hour Recall - Snack: small peach and peppermints  Meal Plan 24 Hour Recall - Beverage: orange of cranberry juice, water, sometimes diet coke  Who shops/cooks?: self  Patient can identify foods that impact blood sugar.: yes (starches, dessert items)  Nutrition/Healthy Eating Skills Assessment Completed:: Yes  Assessment indicates:: Knowledge deficit, Instruction Needed  Area of need?: Yes    Physical Activity/Exercise  Physical Activity/Exercise Skills Assessment Completed: : No  Deffered due to:: Time  Area of need?: Deferred    Home Blood Glucose Monitoring  Patient states that blood sugar is checked at home daily.: yes  Monitoring Method:: personal continuous glucose monitor  Personal CGM type:: Pt in clinic for Dexcom G7 training.  Home Blood Glucose Monitoring Skills Assessment Completed: : Yes  Assessment indicates:: Instruction Needed, Knowledge deficit  Area of need?: Yes    Acute Complications  Have you ever had hypoglycemia (low BG 70 or less)?: no  Do you know the symptoms of low blood sugar and how to treat?: No  Have you ever had hyperglycemia (high  or more)?: yes  How often and what are your symptoms?: Irregular freq - polyuria, polydipsia.  How do you treat hyperglycemia? : Needs review  Acute Complications Skills Assessment Completed: : Yes  Assessment indicates:: Instruction Needed, Knowledge deficit  Area of need?: Yes    Chronic Complications  Chronic Complications Skills Assessment Completed: : No  Deferred due to:: Time  Area of need?: Deferred    Assessment Summary and Plan  Based on today's diabetes care assessment, the following areas of need were identified:          10/23/2024   Areas of Need    Medications/Current Diabetes Treatment No   Lifestyle Coping Support Deferred   Diabetes Disease Process/Treatment Options Deferred   Nutrition/Healthy Eating Yes - see care plan   Physical Activity/Exercise Deferred   Home Blood Glucose Monitoring Yes - see care plan   Acute Complications Yes  Discussed prevention, identification signs/symptoms and treatment of hyperglycemia and hypoglycemia and when to contact clinic.    Chronic Complications Deferred          Today's interventions were provided through individual discussion, instruction, and written materials were provided.    Patient verbalized understanding of instruction and written materials.  Pt was able to return back demonstration of instructions today. Patient understood key points, needs reinforcement and further instruction.     Diabetes Self-Management Care Plan:  Today's Diabetes Self-Management Care Plan was developed with Lacey's input. Lacey has agreed to work toward the following goal(s) to improve his/her overall diabetes control.      Care Plan: Diabetes Management   Updates made since 9/23/2024 12:00 AM        Problem: Healthy Eating         Goal: Use plate method to plan meal portions and continue limit dessert snack frequency.    Start Date: 10/23/2024   Expected End Date: 9/3/2025   Priority: Medium   Barriers: No Barriers Identified        Task: Reviewed the sources and role of Carbohydrate, Protein, and Fat and how each nutrient impacts blood sugar. Completed 10/23/2024        Task: Provided visual examples using dry measuring cups, food models, and other familiar objects such as computer mouse, deck or cards, tennis ball etc. to help with visualization of portions. Completed 10/23/2024        Task: Review the importance of balancing carbohydrates with each meal using portion control techniques to count servings of carbohydrate and label reading to identify serving size and amount of total carbs per serving. Completed 10/23/2024     "    Task: Provided Sample plate method and reviewed the use of the plate to estimate amounts of carbohydrate per meal. Completed 10/23/2024        Problem: Blood Glucose Self-Monitoring         Goal: Patient agrees to use Dexcom and backup meter as directed.    Start Date: 10/23/2024   Expected End Date: 9/3/2025   Priority: High   Barriers: No Barriers Identified   Note:    DEXCOM G7 CGM TRAINING  Pt selected Dexcom provided . Education was provided using "Quick Start Guide" and demo device per Dexcom protocol.     Overview:  5 minute glucose reading updates, trending arrows, BG graph screens, reports screen,  connectivity and functions.   Menus: Trend graph, start sensor, enter BG, events, alerts, settings, replace sensor, stop sensor, and shutdown  Dexcom G7 mobile toshia/ Settings:                          * Urgent Low: 55 mg/dL                          * Urgent Low Soon: On                          * Low Alert: 70 mg/dL                          * High Alert: 250 mg/dL                          * Signal Loss: on                          * Brief Sensor Issue: on     Reviewed additional setting options.  Patient paired sensor using 4-digit code listed on sensor cap..    Reviewed where to find sensor insertion time and expiration date.   Reviewed appropriate calibration techniques.  Reviewed sensor site selection. Patient selected and prepped site using aseptic technique, inserted sensor, applied overlay tape and started session.   Reviewed sensor removal from site. Discussed times to remove sensor per  guidelines include MRI or diatherapy.   Patient able to demonstrate without difficulty. Encouraged to review manual and/or training videos prior to starting another sensor.   Reviewed problem solving aspects of sensor transmission/variables that can disrupt RF transmission.  range 20 feet, but the first 3 hrs keep within 3 feet of transmitter.  Patient instructed on lag time " of interstitial fluid from CBG and was advised to treat hypoglycemia and dose insulin based on SMBG values.  Dexcom technical support contact number given and examples of when to contact them discussed.            Task: Reviewed the importance of self-monitoring blood glucose and keeping logs. Completed 10/23/2024        Task: Instructed on how to self-monitor blood glucose using a home glucometer, how to properly dispose of used strips and lancets after use, and how to appropriately store meter and supplies. Completed 10/23/2024        Task: Reviewed appropriate timing and frequency to SMBG, education on parameters on when to notify provider and advised patient to bring logs to all appts with PCP/Endocrinologist/Diabetes Care Specialist. Completed 10/23/2024        Task: Discussed ways to minimize pain when monitoring blood glucose. Completed 10/23/2024          Follow Up Plan  Follow up in about 6 weeks (around 12/4/2024).  -download Dexcom  and review reports  -eval goals    Today's care plan and follow up schedule was discussed with patient.  Lacey verbalized understanding of the care plan, goals, and agrees to follow up plan.        The patient was encouraged to communicate with his/her health care provider/physician and care team regarding his/her condition(s) and treatment.  I provided the patient with my contact information today and encouraged to contact me via phone or [facility] Patient Portal as needed.     Length of Visit   Total Time: 90 Minutes

## 2024-10-25 DIAGNOSIS — B96.89 BACTERIAL VAGINITIS: Primary | ICD-10-CM

## 2024-10-25 DIAGNOSIS — N76.0 BACTERIAL VAGINITIS: Primary | ICD-10-CM

## 2024-10-25 LAB
BACTERIAL VAGINOSIS DNA: DETECTED
CANDIDA GLABRATA/KRUSEI: NOT DETECTED
CANDIDA RRNA VAG QL PROBE: NOT DETECTED
TRICHOMONAS VAGINALIS: NOT DETECTED

## 2024-10-25 RX ORDER — METRONIDAZOLE 500 MG/1
500 TABLET ORAL 2 TIMES DAILY
Qty: 14 TABLET | Refills: 0 | Status: SHIPPED | OUTPATIENT
Start: 2024-10-25 | End: 2024-11-01

## 2024-10-26 LAB
BACTERIA UR CULT: ABNORMAL
BACTERIA UR CULT: ABNORMAL

## 2024-10-29 ENCOUNTER — TELEPHONE (OUTPATIENT)
Dept: OBSTETRICS AND GYNECOLOGY | Facility: CLINIC | Age: 69
End: 2024-10-29
Payer: MEDICARE

## 2024-10-29 DIAGNOSIS — N30.00 ACUTE CYSTITIS WITHOUT HEMATURIA: Primary | ICD-10-CM

## 2024-10-29 RX ORDER — SULFAMETHOXAZOLE AND TRIMETHOPRIM 800; 160 MG/1; MG/1
1 TABLET ORAL 2 TIMES DAILY
Qty: 10 TABLET | Refills: 0 | Status: SHIPPED | OUTPATIENT
Start: 2024-10-29 | End: 2024-11-03

## 2024-10-30 ENCOUNTER — PATIENT MESSAGE (OUTPATIENT)
Dept: INTERNAL MEDICINE | Facility: CLINIC | Age: 69
End: 2024-10-30
Payer: MEDICARE

## 2024-10-31 ENCOUNTER — TELEPHONE (OUTPATIENT)
Dept: OBSTETRICS AND GYNECOLOGY | Facility: CLINIC | Age: 69
End: 2024-10-31
Payer: MEDICARE

## 2024-11-01 ENCOUNTER — PATIENT OUTREACH (OUTPATIENT)
Dept: ADMINISTRATIVE | Facility: OTHER | Age: 69
End: 2024-11-01
Payer: MEDICARE

## 2024-11-01 NOTE — PROGRESS NOTES
CHW - Outreach Attempt    LPN unable to leave a voicemail message for 1st attempt for initial outreach to contact patient regarding: Referral for information on SNAP for grandchildren due to recent custody. No answer--VM not set up.  Community Health Worker / LPN to attempt to contact patient on:  11/04/24

## 2024-11-04 NOTE — PROGRESS NOTES
LPN spoke to patient/caregiver as per OPCM referral for: Information regarding SNAP application. Has custody of 2 grandchildren.    Does the patient consent to completing the assessment/enrollment: Yes  Does the patient consent for LPN to speak to a caregiver? No    Health Insurance Coverage:     Does the patient have adequate health insurance coverage? Yes  Education provided: No    PCP Follow-Up Appointments:    Does the patient have a primary care provider? yes - Dr Zackary Merrill  Date of last PCP appointment? 08/17/23  Next PCP appointment:  12/03/24  Was patient provided with education surrounding PCP services/creating a medical home? yes - Benefit of an established PCP      Specialist Appointments:     Does the patient have a pending specialist referral? no  Does the patient have an upcoming specialist appointment? no  Is the patient pregnant? No  Does the patient have a mental health provider? no       Home Medications:     Reviewed medication list with patient? No  Is the patient able to afford their medications? Yes    Care Gaps:     Does the patient have any care gaps that are due? YES         Recent lab results:  Blood Sugar:    Provided education: Yes  Blood Pressure:   Provided education: Yes        Social Determinants of Health (SDOH)    Patient's identified areas of need:    Food insecurities,Info on SNAP application  Education/Resources provided:    Food [place],Transportation options besides Medical transportation      Home Health/DME:    Current Home Health: No  Patient has all healthcare equipment/supplies indicated: yes  Current DME:  Glucometer    Additional Documentation:   Called and spoke with patient regarding referral information on SNAP application. She stated she has custody of 2 grandchildren. One she has had for 2 years and the other 1 yr. Says she has attempted 3 times but was denied for food stamps. Confirmed she has paperwork showing she has full custody of the children and the both  have Medicaid coverage. Informed patient with proper paperwork they should not denied any SNAP benefits. After talking with the patient ,she stated her daughter,the kids mother did caused issues in completing the paperwork. She also stated the SNAP office informed her the paperwork can only be done in person in order to provide proof of gaurdianship. Chandan mail a list of food laurent for her address area until food stamp benefits are approved and other transportation option.      Follow up:   Patient agrees to scheduled follow up call. Yes     Next 11/19/24

## 2024-12-03 ENCOUNTER — TELEPHONE (OUTPATIENT)
Dept: DIABETES | Facility: CLINIC | Age: 69
End: 2024-12-03
Payer: MEDICARE

## 2024-12-03 NOTE — TELEPHONE ENCOUNTER
Spoke with pt. She reports unable to make appt today and requests call back ~1wk to assist w/ RS. Reminder set to contact pt. Successful outreach.

## 2024-12-04 DIAGNOSIS — Z79.4 TYPE 2 DIABETES MELLITUS WITH MICROALBUMINURIA, WITH LONG-TERM CURRENT USE OF INSULIN: Chronic | ICD-10-CM

## 2024-12-04 DIAGNOSIS — E11.69 TYPE 2 DIABETES MELLITUS WITH OTHER SPECIFIED COMPLICATION, WITH LONG-TERM CURRENT USE OF INSULIN: Chronic | ICD-10-CM

## 2024-12-04 DIAGNOSIS — E11.29 TYPE 2 DIABETES MELLITUS WITH MICROALBUMINURIA, WITH LONG-TERM CURRENT USE OF INSULIN: Chronic | ICD-10-CM

## 2024-12-04 DIAGNOSIS — R80.9 TYPE 2 DIABETES MELLITUS WITH MICROALBUMINURIA, WITH LONG-TERM CURRENT USE OF INSULIN: Chronic | ICD-10-CM

## 2024-12-04 DIAGNOSIS — Z79.4 TYPE 2 DIABETES MELLITUS WITH OTHER SPECIFIED COMPLICATION, WITH LONG-TERM CURRENT USE OF INSULIN: Chronic | ICD-10-CM

## 2024-12-04 NOTE — TELEPHONE ENCOUNTER
Care Due:                  Date            Visit Type   Department     Provider  --------------------------------------------------------------------------------                                EP -                              PRIMARY      [facility] INTERNAL  Last Visit: 08-      CARE ([facility])   VELMA Merrill                              EP -                              PRIMARY      [facility] INTERNAL  Next Visit: 01-      CARE ([facility])   VELMA Merrill                                                            Last  Test          Frequency    Reason                     Performed    Due Date  --------------------------------------------------------------------------------    Micaela.........  12 months..  LANTUS...................  07- 07-    [facility] Embedded Care Due Messages. Reference number: [id number].   12/04/2024 2:04:24 PM CST

## 2024-12-05 NOTE — TELEPHONE ENCOUNTER
Refill Routing Note   Medication(s) are not appropriate for processing by [facility] for the following reason(s):        Patient not seen by provider within 15 months  Required labs outdated    [facility] action(s):  Defer     Requires labs : Yes             Appointments  past 12m or future 3m with PCP    Date Provider   Last Visit   8/17/2023 SURINDER Merrill MD   Next Visit   1/14/2025 SURINDER Merrill MD   ED visits in past 90 days: 0        Note composed:2:17 AM 12/05/2024

## 2024-12-07 RX ORDER — INSULIN GLARGINE 100 [IU]/ML
INJECTION, SOLUTION SUBCUTANEOUS
Qty: 30 ML | Refills: 0 | Status: SHIPPED | OUTPATIENT
Start: 2024-12-07

## 2024-12-08 NOTE — TELEPHONE ENCOUNTER
REFILL APPROVED. Will address further refills at upcoming appointment with me listed below.  #LMRX   --------------------------------  Future Appointments   Date Time Provider Department Center   12/19/2024 10:40 AM Braydon Root MD [facility]  [place]   1/14/2025  8:40 AM SURINDER Merrill MD [facility]

## 2024-12-10 ENCOUNTER — TELEPHONE (OUTPATIENT)
Dept: DIABETES | Facility: CLINIC | Age: 69
End: 2024-12-10
Payer: MEDICARE

## 2024-12-11 DIAGNOSIS — M25.561 PAIN IN BOTH KNEES, UNSPECIFIED CHRONICITY: Primary | ICD-10-CM

## 2024-12-11 DIAGNOSIS — M25.562 PAIN IN BOTH KNEES, UNSPECIFIED CHRONICITY: Primary | ICD-10-CM

## 2024-12-12 ENCOUNTER — TELEPHONE (OUTPATIENT)
Dept: DIABETES | Facility: CLINIC | Age: 69
End: 2024-12-12
Payer: MEDICARE

## 2024-12-12 NOTE — PROGRESS NOTES
"       Patient ID: Lacey Calderón  YOB: 1955  MRN: [id number]    Chief Complaint: Pain of the Left Knee and Pain of the Right Knee    Referred By: Clarita Bhatti NP    Occupation: Retired    History of Present Illness: Lacey Calderón is a 69 y.o. female who presents today with Pain of the Left Knee and Pain of the Right Knee     History of Present Illness    Lacey  presents with bilateral knee pain that alternates between left and right, sometimes affecting both simultaneously. The pain radiates from the front to the back of the knees, extending down to the lower leg. She reports, "The pain affects both knees, particularly behind the knee."    She manages pain through daily exercise and creams, which provide temporary relief. She attempts to walk as much as possible but experiences limitations. Colby states, "After walking approximately five blocks, my knees begin to feel numb and start aching, necessitating rest." After sitting for 5-10 minutes, her knees start to feel stiff.    She reports two falls this year, with the most recent resulting in hospitalization. Colby explains, "I lacerated my forehead above my eyebrow. Due to being on anticoagulants, there was significant bleeding." This injury required half a day of hospital care and resulted in headaches for a few days afterward.    She uses a cane for support when walking and has a ramp at home to assist with mobility. She notes increased pain during rainy weather, stating, "The rainy weather exacerbates my knee pain significantly."    Colby's last treatment for knee pain was a gel injection approximately five months ago.    Colby denies any tingling or nerve issues in her feet despite being diabetic.    MEDICATIONS:  - Blood thinners        Previous HPI:  She was initially evaluated in our office on 10/6/22 with bilateral knee pain.  She was diagnosed with bilateral knee OA and treated with right knee CSI on 11/15/22 which provided " limited relief.  She has been treated since with two rounds of Euflexxa injections, most recently completed on 3/14/23.     Past Medical History:   Past Medical History:   Diagnosis Date    Arthritis     Cardiac arrhythmia 11/13/2020    Cataract     Class 2 severe obesity due to excess calories with serious comorbidity and body mass index (BMI) of 37.0 to 37.9 in adult 10/19/2020    Essential hypertension 10/19/2020    Hyperlipidemia complicating type 2 diabetes 10/24/2020    Hypertension     Paroxysmal atrial fibrillation 11/13/2020    Primary localized osteoarthritis of knees, bilateral 10/19/2020    Pure hypercholesterolemia 10/24/2020    Pure hypercholesterolemia 10/24/2020    Type 2 diabetes mellitus with hyperglycemia, with long-term current use of insulin 6/3/2021    Type 2 diabetes mellitus with other specified complication 6/3/2021     Past Surgical History:   Procedure Laterality Date    COLONOSCOPY N/A 12/7/2023    Procedure: COLONOSCOPY;  Surgeon: Nathan Mendoza MD;  Location: [facility];  Service: Endoscopy;  Laterality: N/A;    MULTIPLE TOOTH EXTRACTIONS       Family History   Problem Relation Name Age of Onset    Arthritis Mother      Heart disease Mother      Miscarriages / Stillbirths Mother      Dementia Mother      Glaucoma Mother      Cataracts Mother      No Known Problems Father       Social History     Socioeconomic History    Marital status: Unknown   Tobacco Use    Smoking status: Never    Smokeless tobacco: Never   Substance and Sexual Activity    Alcohol use: Never    Drug use: Never    Sexual activity: Not Currently     Partners: Male     Social Drivers of Health     Financial Resource Strain: Medium Risk (11/4/2024)    Overall Financial Resource Strain (CARDIA)     Difficulty of Paying Living Expenses: Somewhat hard   Food Insecurity: Food Insecurity Present (11/4/2024)    Hunger Vital Sign     Worried About Running Out of Food in the Last Year: Often true     Ran Out of Food in  the Last Year: Often true   Transportation Needs: No Transportation Needs (11/4/2024)    PRAPARE - Transportation     Lack of Transportation (Medical): No     Lack of Transportation (Non-Medical): No   Physical Activity: Patient Declined (11/4/2024)    Exercise Vital Sign     Days of Exercise per Week: Patient declined     Minutes of Exercise per Session: Patient declined   Stress: Stress Concern Present (11/4/2024)    [employer] - Occupational Stress Questionnaire     Feeling of Stress : To some extent   Housing Stability: Low Risk  (11/4/2024)    Housing Stability Vital Sign     Unable to Pay for Housing in the Last Year: No     Homeless in the Last Year: No     Medication List with Changes/Refills   Current Medications    AMLODIPINE (NORVASC) 10 MG TABLET    Take 1 tablet by mouth once daily    APIXABAN (ELIQUIS) 5 MG TAB    Take 1 tablet by mouth twice daily    BLOOD SUGAR DIAGNOSTIC STRP    Check blood glucose 4 times daily (BEFORE breakfast or other meal) and as needed for symptoms of low or high blood glucose    BLOOD-GLUCOSE METER KIT    Check blood glucose 4 times daily (BEFORE breakfast or other meal) and as needed for symptoms of low or high blood glucose    CALCIUM CARBONATE-VITAMIN D3 (CALCIUM 600 WITH VITAMIN D3) 600 MG-12.5 MCG (500 UNIT) CAP    Take by mouth 2 (two) times a day.    CARVEDILOL (COREG) 6.25 MG TABLET    Take 1 tablet (6.25 mg total) by mouth 2 (two) times daily.    CHLORTHALIDONE (HYGROTEN) 25 MG TAB    Take 1 tablet (25 mg total) by mouth once daily.    DIPH,PERTUSS,ACEL,TET-VAC,(ADACEL,TDAP ADOLESN/ADULT,PF)2 LF-(2.5-5-3-5 MCG)-5LF/0.5 ML SYRG        DORZOLAMIDE-TIMOLOL 2-0.5% (COSOPT) 22.3-6.8 MG/ML OPHTHALMIC SOLUTION    Place 1 drop into both eyes every 12 (twelve) hours.    EMPTY CONTAINER (SHARPS CONTAINER) MISC    Use to dispose sharps    IBUPROFEN (ADVIL,MOTRIN) 600 MG TABLET    Take 600 mg by mouth 3 (three) times daily.    INSULIN LISPRO 100  "UNIT/ML PEN    INJECT 9 UNITS SUBCUTANEOUSLY THREE TIMES DAILY BEFORE MEAL(S)    KETOROLAC 0.5% (ACULAR) 0.5 % DROP    Place 1 drop into the left eye 4 (four) times daily.    LANCETS MISC    Check blood glucose 4 times daily (BEFORE breakfast or other meal) and as needed for symptoms of low or high blood glucose    LANTUS SOLOSTAR U-100 INSULIN 100 UNIT/ML (3 ML) INPN PEN    INJECT 30 UNITS SUBCUTANEOUSLY NIGHTLY    METFORMIN (GLUCOPHAGE-XR) 500 MG ER 24HR TABLET    Take 1 tablet (500 mg total) by mouth once daily.    PEN NEEDLE, DIABETIC (BD ULTRA-FINE SHORT PEN NEEDLE) 31 GAUGE X 5/16" NDLE    USE AS DIRECTED TO  INJECT  INSULIN  AS  PRESCRIBED  BY  [facility]  PROVIDER Strength: 31 gauge x 5/16"    POTASSIUM CHLORIDE SA (K-DUR,KLOR-CON M) 10 MEQ TABLET    Take 1 tablet (10 mEq total) by mouth once daily.    PREDNISOLONE ACETATE (PRED FORTE) 1 % DRPS    Place 1 drop into the left eye 4 (four) times daily.    ROSUVASTATIN (CRESTOR) 20 MG TABLET    Take 1 tablet (20 mg total) by mouth every evening.    RSVPREF3 ANTIGEN-AS01E, PF, (AREXVY) 120 MCG/0.5 ML SUSR VACCINE    Pharmacist inject 0.5 mL intramuscular for one dose according to CDC guidance and pharmacy protocol.    VALSARTAN (DIOVAN) 320 MG TABLET    Take 1 tablet (320 mg total) by mouth once daily.     Review of patient's allergies indicates:  No Known Allergies    Physical Exam:   Body mass index is 32.59 kg/m².    Detailed MSK exam:   General    Constitutional: She is oriented to person, place, and time. No distress.   HENT:   Head: Normocephalic and atraumatic.   Eyes: Conjunctivae are normal.   Pulmonary/Chest: Effort normal. No respiratory distress.   Neurological: She is alert and oriented to person, place, and time.           Right Knee Exam     Inspection   Swelling: absent  Bruising: absent    Tenderness   The patient is tender to palpation of the condyle, medial joint line and lateral joint line.    Crepitus   The patient has crepitus of the lateral " joint line and medial joint line.    Range of Motion   Extension:  normal   Flexion:  normal     Tests   Meniscus   Rogelio:  Medial - negative Lateral - negative    Left Knee Exam     Inspection   Swelling: absent  Effusion: absent    Tenderness   The patient tender to palpation of the condyle, medial joint line and lateral joint line.    Crepitus   The patient has crepitus of the lateral joint line and medial joint line.    Range of Motion   Extension:  normal   Flexion:  normal     Tests   Meniscus   Rogelio:  Medial - negative Lateral - negative    Muscle Strength   Right Lower Extremity   Hip Abduction: 4/5   Quadriceps:  4/5   Hamstrin/5   Left Lower Extremity   Hip Abduction: 4/5   Hamstrin/5      Imaging:    X-ray Knee Ortho Bilateral with Flexion  Narrative: EXAM: XR KNEE ORTHO BILAT WITH FLEXION    CLINICAL HISTORY: Bilateral knee joint pain.    FINDINGS: 5 views bilateral knees.  No comparison.    Moderate to severe tricompartment degenerative changes with joint space during, sclerosis and marginal osteophytes greatest within the medial compartments, right greater the left.  Small bilateral joint effusions are suspected.  No fracture, dislocation, or other acute abnormality is identified. No erosive change identified.  No loose intra-articular osteochondral bodies or osteochondral defect is evident.  Impression:   As above.    Finalized on: 2024 8:05 AM By:  Jose Rafael Montes De Oca MD  R# [id number]      2024 08:07:[id number]    BRR          Patient Instructions   Assessment:  Lacey Calderón is a 69 y.o. female with a chief complaint of Pain of the Left Knee and Pain of the Right Knee    Encounter Diagnoses   Name Primary?    Primary localized osteoarthritis of knees, bilateral Yes    Bilateral chronic knee pain     Type 2 diabetes mellitus with microalbuminuria, with long-term current use of insulin     Essential hypertension     Chronic anticoagulation       Plan:  Bilateral knee pain due  to underlying degenerative osteoarthritis.  Bilateral knee corticosteroid injections today.    Proper protocols after the injection included: no submerging pools, baths tubs, or hot tubs for 24 hr.  Showering is okay today.  Side effects of the corticosteroid injection can include elevated blood glucose levels and blood pressures, so if you are taking medications for these, please monitor closely, and contact your PCP if any issues.  Red flag symptoms include fever, chills, nausea, vomiting, red, warm, tender joint at the area of injection.  If you are noticing these symptoms, they may be indicative of an infection, and please seek medical care immediately, either by calling our clinic or going to the emergency room.  Patient has a good candidate for viscosupplementation injections, previously had good relief, 6+ months with Euflexxa for the right knee.    We will order for bilateral knee Euflexxa injection series.    MEDICAL NECESSITY FOR VISCOSUPPLEMENTATION: After thorough evaluation of the patient, I have determined that visco-supplementation is medically necessary. The patient has painful DJD of the knee with failure of conservative therapy including lifestyle modifications and rehabilitation exercises. Oral analgesis/NSAIDs have not adequately controlled symptoms and there is radiographic evidence of joint space narrowing, subchondral sclerosis, and some early osteophytic changes, or in lack of radiographic evidence, there is arthroscopic or other evidence of chondrosis.  Kellgren- Matt grade 2 or greater.  NSAIDs contraindicated, as patient is on chronic anticoagulation with Eliquis.    Can use Tylenol as needed for pain and discomfort.    Continue regular activities, exercises able, and use of cane for ambulation assistance    Follow-up:  4 weeks for Euflexxa injections or sooner if there are any problems between now and then.    Thank you for choosing [facility] and Dr. Bryson  Dk for your orthopedic & sports medicine care. It is our goal to provide you with exceptional care that will help keep you healthy, active, and get you back in the game.    Please do not hesitate to reach out to us via email, phone, or MyChart with any questions, concerns, or feedback.    If you are experiencing pain/discomfort ,or have questions after 5pm and would like to be connected to the [facility] [place] on-call team, please call this number and specify which Sports Medicine provider is treating you: [phone]          Braydon Root MD  Primary Care Sports Medicine      Disclaimer: This note was prepared using a voice recognition system and is likely to have sound alike errors within the text.     This note was generated with the assistance of ambient listening technology. Verbal consent was obtained by the patient and accompanying visitor(s) for the recording of patient appointment to facilitate this note. I attest to having reviewed and edited the generated note for accuracy, though some syntax or spelling errors may persist. Please contact the author of this note for any clarification.

## 2024-12-17 ENCOUNTER — HOSPITAL ENCOUNTER (OUTPATIENT)
Dept: RADIOLOGY | Facility: HOSPITAL | Age: 69
Discharge: HOME | End: 2024-12-17
Attending: STUDENT IN AN ORGANIZED HEALTH CARE EDUCATION/TRAINING PROGRAM
Payer: MEDICARE

## 2024-12-17 ENCOUNTER — OFFICE VISIT (OUTPATIENT)
Dept: SPORTS MEDICINE | Facility: CLINIC | Age: 69
End: 2024-12-17
Payer: MEDICARE

## 2024-12-17 VITALS — BODY MASS INDEX: 32.59 KG/M2 | HEIGHT: 66 IN

## 2024-12-17 DIAGNOSIS — G89.29 BILATERAL CHRONIC KNEE PAIN: ICD-10-CM

## 2024-12-17 DIAGNOSIS — R80.9 TYPE 2 DIABETES MELLITUS WITH MICROALBUMINURIA, WITH LONG-TERM CURRENT USE OF INSULIN: ICD-10-CM

## 2024-12-17 DIAGNOSIS — Z79.01 CHRONIC ANTICOAGULATION: ICD-10-CM

## 2024-12-17 DIAGNOSIS — M25.561 PAIN IN BOTH KNEES, UNSPECIFIED CHRONICITY: ICD-10-CM

## 2024-12-17 DIAGNOSIS — M25.562 PAIN IN BOTH KNEES, UNSPECIFIED CHRONICITY: ICD-10-CM

## 2024-12-17 DIAGNOSIS — M25.562 BILATERAL CHRONIC KNEE PAIN: ICD-10-CM

## 2024-12-17 DIAGNOSIS — M25.561 BILATERAL CHRONIC KNEE PAIN: ICD-10-CM

## 2024-12-17 DIAGNOSIS — M17.0 PRIMARY LOCALIZED OSTEOARTHRITIS OF KNEES, BILATERAL: Primary | ICD-10-CM

## 2024-12-17 DIAGNOSIS — Z79.4 TYPE 2 DIABETES MELLITUS WITH MICROALBUMINURIA, WITH LONG-TERM CURRENT USE OF INSULIN: ICD-10-CM

## 2024-12-17 DIAGNOSIS — E11.29 TYPE 2 DIABETES MELLITUS WITH MICROALBUMINURIA, WITH LONG-TERM CURRENT USE OF INSULIN: ICD-10-CM

## 2024-12-17 DIAGNOSIS — I10 ESSENTIAL HYPERTENSION: ICD-10-CM

## 2024-12-17 PROCEDURE — 73564 X-RAY EXAM KNEE 4 OR MORE: CPT | Mod: TC,50

## 2024-12-17 PROCEDURE — 3061F NEG MICROALBUMINURIA REV: CPT | Mod: CPTII,S$GLB,, | Performed by: STUDENT IN AN ORGANIZED HEALTH CARE EDUCATION/TRAINING PROGRAM

## 2024-12-17 PROCEDURE — 1160F RVW MEDS BY RX/DR IN RCRD: CPT | Mod: CPTII,S$GLB,, | Performed by: STUDENT IN AN ORGANIZED HEALTH CARE EDUCATION/TRAINING PROGRAM

## 2024-12-17 PROCEDURE — 73564 X-RAY EXAM KNEE 4 OR MORE: CPT | Mod: 26,50,, | Performed by: RADIOLOGY

## 2024-12-17 PROCEDURE — 99999 PR PBB SHADOW E&M-EST. PATIENT-LVL III: CPT | Mod: PBBFAC,,, | Performed by: STUDENT IN AN ORGANIZED HEALTH CARE EDUCATION/TRAINING PROGRAM

## 2024-12-17 PROCEDURE — 99214 OFFICE O/P EST MOD 30 MIN: CPT | Mod: 25,S$GLB,, | Performed by: STUDENT IN AN ORGANIZED HEALTH CARE EDUCATION/TRAINING PROGRAM

## 2024-12-17 PROCEDURE — 1100F PTFALLS ASSESS-DOCD GE2>/YR: CPT | Mod: CPTII,S$GLB,, | Performed by: STUDENT IN AN ORGANIZED HEALTH CARE EDUCATION/TRAINING PROGRAM

## 2024-12-17 PROCEDURE — 3288F FALL RISK ASSESSMENT DOCD: CPT | Mod: CPTII,S$GLB,, | Performed by: STUDENT IN AN ORGANIZED HEALTH CARE EDUCATION/TRAINING PROGRAM

## 2024-12-17 PROCEDURE — 3066F NEPHROPATHY DOC TX: CPT | Mod: CPTII,S$GLB,, | Performed by: STUDENT IN AN ORGANIZED HEALTH CARE EDUCATION/TRAINING PROGRAM

## 2024-12-17 PROCEDURE — 1159F MED LIST DOCD IN RCRD: CPT | Mod: CPTII,S$GLB,, | Performed by: STUDENT IN AN ORGANIZED HEALTH CARE EDUCATION/TRAINING PROGRAM

## 2024-12-17 PROCEDURE — 3044F HG A1C LEVEL LT 7.0%: CPT | Mod: CPTII,S$GLB,, | Performed by: STUDENT IN AN ORGANIZED HEALTH CARE EDUCATION/TRAINING PROGRAM

## 2024-12-17 PROCEDURE — 20610 DRAIN/INJ JOINT/BURSA W/O US: CPT | Mod: 50,S$GLB,, | Performed by: STUDENT IN AN ORGANIZED HEALTH CARE EDUCATION/TRAINING PROGRAM

## 2024-12-17 PROCEDURE — 1125F AMNT PAIN NOTED PAIN PRSNT: CPT | Mod: CPTII,S$GLB,, | Performed by: STUDENT IN AN ORGANIZED HEALTH CARE EDUCATION/TRAINING PROGRAM

## 2024-12-17 PROCEDURE — 4010F ACE/ARB THERAPY RXD/TAKEN: CPT | Mod: CPTII,S$GLB,, | Performed by: STUDENT IN AN ORGANIZED HEALTH CARE EDUCATION/TRAINING PROGRAM

## 2024-12-17 PROCEDURE — 3008F BODY MASS INDEX DOCD: CPT | Mod: CPTII,S$GLB,, | Performed by: STUDENT IN AN ORGANIZED HEALTH CARE EDUCATION/TRAINING PROGRAM

## 2024-12-17 RX ORDER — TRIAMCINOLONE ACETONIDE 40 MG/ML
20 INJECTION, SUSPENSION INTRA-ARTICULAR; INTRAMUSCULAR
Status: DISCONTINUED | OUTPATIENT
Start: 2024-12-17 | End: 2024-12-17 | Stop reason: HOSPADM

## 2024-12-17 RX ADMIN — TRIAMCINOLONE ACETONIDE 20 MG: 40 INJECTION, SUSPENSION INTRA-ARTICULAR; INTRAMUSCULAR at 09:12

## 2024-12-17 NOTE — PROCEDURES
Large Joint Aspiration/Injection: bilateral knee    Date/Time: 12/17/2024 9:40 AM    Performed by: Braydon Root MD  Authorized by: Braydon Root MD    Consent Done?:  Yes (Verbal)  Indications:  Arthritis and pain  Site marked: the procedure site was marked    Timeout: prior to procedure the correct patient, procedure, and site was verified      Local anesthesia used?: Yes    Anesthesia:  Local infiltration  Local anesthetic:  Bupivacaine 0.5% without epinephrine, lidocaine 1% without epinephrine and topical anesthetic  Anesthetic total (ml):  4      Details:  Needle Size:  22 G  Ultrasonic Guidance for needle placement?: No    Approach:  Anterolateral  Location:  Knee  Laterality:  Bilateral  Site:  Bilateral knee  Medications (Right):  20 mg triamcinolone acetonide 40 mg/mL  Medications (Left):  20 mg triamcinolone acetonide 40 mg/mL  Patient tolerance:  Patient tolerated the procedure well with no immediate complications     We discussed the proper protocols after the injection such as no submerging pools, baths tubs, or hot tubs for 24 hr.  Showering is okay today.  We also discussed that blood sugars can be elevated after an injection and asked patient to properly checked her sugars over the next few days and contact their PCP if there are any concerns.  We discussed red flags such as fevers, chills, red, warm, tender joint at the area of injection to please seek medical care immediately.

## 2024-12-17 NOTE — PATIENT INSTRUCTIONS
Assessment:  Lacey Calderón is a 69 y.o. female with a chief complaint of Pain of the Left Knee and Pain of the Right Knee    Encounter Diagnoses   Name Primary?    Primary localized osteoarthritis of knees, bilateral Yes    Bilateral chronic knee pain     Type 2 diabetes mellitus with microalbuminuria, with long-term current use of insulin     Essential hypertension     Chronic anticoagulation       Plan:  Bilateral knee pain due to underlying degenerative osteoarthritis.  Bilateral knee corticosteroid injections today.    Proper protocols after the injection included: no submerging pools, baths tubs, or hot tubs for 24 hr.  Showering is okay today.  Side effects of the corticosteroid injection can include elevated blood glucose levels and blood pressures, so if you are taking medications for these, please monitor closely, and contact your PCP if any issues.  Red flag symptoms include fever, chills, nausea, vomiting, red, warm, tender joint at the area of injection.  If you are noticing these symptoms, they may be indicative of an infection, and please seek medical care immediately, either by calling our clinic or going to the emergency room.  Patient has a good candidate for viscosupplementation injections, previously had good relief, 6+ months with Euflexxa for the right knee.    We will order for bilateral knee Euflexxa injection series.    MEDICAL NECESSITY FOR VISCOSUPPLEMENTATION: After thorough evaluation of the patient, I have determined that visco-supplementation is medically necessary. The patient has painful DJD of the knee with failure of conservative therapy including lifestyle modifications and rehabilitation exercises. Oral analgesis/NSAIDs have not adequately controlled symptoms and there is radiographic evidence of joint space narrowing, subchondral sclerosis, and some early osteophytic changes, or in lack of radiographic evidence, there is arthroscopic or other evidence of chondrosis.  Kellgren-  Matt grade 2 or greater.  NSAIDs contraindicated, as patient is on chronic anticoagulation with Eliquis.    Can use Tylenol as needed for pain and discomfort.    Continue regular activities, exercises able, and use of cane for ambulation assistance    Follow-up:  4 weeks for Euflexxa injections or sooner if there are any problems between now and then.    Thank you for choosing [facility] and Dr. Braydon Root for your orthopedic & sports medicine care. It is our goal to provide you with exceptional care that will help keep you healthy, active, and get you back in the game.    Please do not hesitate to reach out to us via email, phone, or MyChart with any questions, concerns, or feedback.    If you are experiencing pain/discomfort ,or have questions after 5pm and would like to be connected to the [facility] on-call team, please call this number and specify which Sports Medicine provider is treating you: [phone]

## 2025-01-06 ENCOUNTER — PATIENT OUTREACH (OUTPATIENT)
Dept: ADMINISTRATIVE | Facility: OTHER | Age: 70
End: 2025-01-06
Payer: MEDICARE

## 2025-01-06 NOTE — PROGRESS NOTES
CHW - Outreach Attempt    LPN unable to leave  a voicemail message for 1st attempt on follow up to contact patient regarding: Completion of SNAP application due to having custody of 2 grand kids.  Community Health Worker LPN to attempt to contact patient on: 01/13/25

## 2025-01-07 NOTE — PROGRESS NOTES
CHW - Outreach Attempt    LPN unable to leave a voicemail message for 2nd attempt on follow up to contact patient regarding: SNAP application. Voicemail not set up.  Community Health Worker / LPN to attempt to contact patient on: 01/13/25

## 2025-01-09 ENCOUNTER — PATIENT OUTREACH (OUTPATIENT)
Dept: ADMINISTRATIVE | Facility: OTHER | Age: 70
End: 2025-01-09
Payer: MEDICARE

## 2025-01-09 NOTE — PROGRESS NOTES
CHW - Case Closure    This LPN unable to speak to patient via telephone today. 01/09/25  3 Failed attempts in reaching patient.    At this time  the episode closed .

## 2025-01-09 NOTE — PROGRESS NOTES
CHW - Outreach Attempt    LPN unable to leave a voicemail message for 3rd attempt to contact patient regarding: F/U on SNAP application for grandchildren. Voicemail not set up.    3 unsuccessful attempts for follow up in reaching patient.

## 2025-01-13 DIAGNOSIS — M17.0 PRIMARY LOCALIZED OSTEOARTHRITIS OF KNEES, BILATERAL: Primary | ICD-10-CM

## 2025-01-22 ENCOUNTER — PATIENT MESSAGE (OUTPATIENT)
Dept: SPORTS MEDICINE | Facility: CLINIC | Age: 70
End: 2025-01-22
Payer: MEDICARE

## 2025-01-30 ENCOUNTER — PROCEDURE VISIT (OUTPATIENT)
Dept: SPORTS MEDICINE | Facility: CLINIC | Age: 70
End: 2025-01-30
Payer: MEDICARE

## 2025-01-30 DIAGNOSIS — M25.562 BILATERAL CHRONIC KNEE PAIN: ICD-10-CM

## 2025-01-30 DIAGNOSIS — M17.0 PRIMARY LOCALIZED OSTEOARTHRITIS OF KNEES, BILATERAL: Primary | ICD-10-CM

## 2025-01-30 DIAGNOSIS — G89.29 BILATERAL CHRONIC KNEE PAIN: ICD-10-CM

## 2025-01-30 DIAGNOSIS — M25.561 BILATERAL CHRONIC KNEE PAIN: ICD-10-CM

## 2025-01-30 PROCEDURE — 99499 UNLISTED E&M SERVICE: CPT | Mod: S$GLB,,, | Performed by: STUDENT IN AN ORGANIZED HEALTH CARE EDUCATION/TRAINING PROGRAM

## 2025-01-30 PROCEDURE — 20610 DRAIN/INJ JOINT/BURSA W/O US: CPT | Mod: 50,S$GLB,, | Performed by: STUDENT IN AN ORGANIZED HEALTH CARE EDUCATION/TRAINING PROGRAM

## 2025-01-30 NOTE — PROCEDURES
Large Joint Aspiration/Injection: bilateral knee Synvisc #1/3    Date/Time: 1/30/2025 9:20 AM    Performed by: Braydon Root MD  Authorized by: Braydon Root MD    Consent Done?:  Yes (Verbal)  Indications:  Arthritis and pain  Site marked: the procedure site was marked    Timeout: prior to procedure the correct patient, procedure, and site was verified      Local anesthesia used?: Yes    Local anesthetic:  Topical anesthetic    Details:  Needle Size:  22 G  Ultrasonic Guidance for needle placement?: No    Approach:  Anterolateral  Location:  Knee  Laterality:  Bilateral  Site:  Bilateral knee  Medications (Right):  16 mg hyaluronate 16 mg/2 mL  Medications (Left):  16 mg hyaluronate 16 mg/2 mL  Patient tolerance:  Patient tolerated the procedure well with no immediate complications     We discussed the proper protocols after the injection such as no submerging pools, baths tubs, or hot tubs for 24 hr.  Showering is okay today.  We also discussed that blood sugars can be elevated after an injection and asked patient to properly check their sugars over the next few days and contact their PCP if there are any concerns.  We discussed red flags such as fevers, chills, red, warm, tender joint at the area of injection to please seek medical care immediately.

## 2025-01-30 NOTE — PATIENT INSTRUCTIONS
Assessment:  Lacey Calderón is a 70 y.o. female with a chief complaint of No chief complaint on file.    Encounter Diagnoses   Name Primary?    Primary localized osteoarthritis of knees, bilateral Yes    Bilateral chronic knee pain       Plan:  Bilateral knee Synvisc injections, #1/3.    Proper protocols after the aspiration included: no submerging pools, baths tubs, or hot tubs for 24 hr.  Showering is okay today.  Red flag symptoms include fever, chills, nausea, vomiting, red, warm, tender joint at the area of injection.  If you are noticing these symptoms, they may be indicative of an infection, and please seek medical care immediately, either by calling our clinic or going to the emergency room.    Follow-up:  One week for Synvisc injections #2/3 or sooner if there are any problems between now and then.    Thank you for choosing [facility] and Dr. Braydon Root for your orthopedic & sports medicine care. It is our goal to provide you with exceptional care that will help keep you healthy, active, and get you back in the game.    Please do not hesitate to reach out to us via email, phone, or MyChart with any questions, concerns, or feedback.    If you are experiencing pain/discomfort ,or have questions after 5pm and would like to be connected to the [facility] [place] [facility] on-call team, please call this number and specify which Sports Medicine provider is treating you: [phone]

## 2025-02-06 ENCOUNTER — PROCEDURE VISIT (OUTPATIENT)
Dept: SPORTS MEDICINE | Facility: CLINIC | Age: 70
End: 2025-02-06
Payer: MEDICARE

## 2025-02-06 DIAGNOSIS — M17.0 PRIMARY LOCALIZED OSTEOARTHRITIS OF KNEES, BILATERAL: Primary | ICD-10-CM

## 2025-02-06 DIAGNOSIS — M25.561 BILATERAL CHRONIC KNEE PAIN: ICD-10-CM

## 2025-02-06 DIAGNOSIS — M25.562 BILATERAL CHRONIC KNEE PAIN: ICD-10-CM

## 2025-02-06 DIAGNOSIS — G89.29 BILATERAL CHRONIC KNEE PAIN: ICD-10-CM

## 2025-02-06 PROCEDURE — 99499 UNLISTED E&M SERVICE: CPT | Mod: S$GLB,,, | Performed by: STUDENT IN AN ORGANIZED HEALTH CARE EDUCATION/TRAINING PROGRAM

## 2025-02-06 PROCEDURE — 20610 DRAIN/INJ JOINT/BURSA W/O US: CPT | Mod: 50,S$GLB,, | Performed by: STUDENT IN AN ORGANIZED HEALTH CARE EDUCATION/TRAINING PROGRAM

## 2025-02-06 NOTE — PATIENT INSTRUCTIONS
Assessment:  Lacey Calderón is a 70 y.o. female with a chief complaint of No chief complaint on file.    Encounter Diagnoses   Name Primary?    Primary localized osteoarthritis of knees, bilateral Yes    Bilateral chronic knee pain       Plan:  Bilateral knee Synvisc injections, #2/3.    Proper protocols after the aspiration included: no submerging pools, baths tubs, or hot tubs for 24 hr.  Showering is okay today.  Red flag symptoms include fever, chills, nausea, vomiting, red, warm, tender joint at the area of injection.  If you are noticing these symptoms, they may be indicative of an infection, and please seek medical care immediately, either by calling our clinic or going to the emergency room.    Follow-up:  One week for Synvisc injections #3/3 or sooner if there are any problems between now and then.    Thank you for choosing [facility] and Dr. Braydon Root for your orthopedic & sports medicine care. It is our goal to provide you with exceptional care that will help keep you healthy, active, and get you back in the game.    Please do not hesitate to reach out to us via email, phone, or MyChart with any questions, concerns, or feedback.    If you are experiencing pain/discomfort ,or have questions after 5pm and would like to be connected to the [facility] [place] on-call team, please call this number and specify which Sports Medicine provider is treating you: [phone]

## 2025-02-06 NOTE — PROCEDURES
Large Joint Aspiration/Injection: bilateral knee Synvisc #2/3    Date/Time: 2/6/2025 10:40 AM    Performed by: Braydon Root MD  Authorized by: Braydon Root MD    Consent Done?:  Yes (Verbal)  Indications:  Arthritis and pain  Site marked: the procedure site was marked    Timeout: prior to procedure the correct patient, procedure, and site was verified      Local anesthesia used?: Yes    Local anesthetic:  Topical anesthetic    Details:  Needle Size:  22 G  Ultrasonic Guidance for needle placement?: No    Approach:  Anterolateral  Location:  Knee  Laterality:  Bilateral  Site:  Bilateral knee  Medications (Right):  16 mg hyaluronate 16 mg/2 mL  Medications (Left):  16 mg hyaluronate 16 mg/2 mL  Patient tolerance:  Patient tolerated the procedure well with no immediate complications     We discussed the proper protocols after the injection such as no submerging pools, baths tubs, or hot tubs for 24 hr.  Showering is okay today.  We also discussed that blood sugars can be elevated after an injection and asked patient to properly check their sugars over the next few days and contact their PCP if there are any concerns.  We discussed red flags such as fevers, chills, red, warm, tender joint at the area of injection to please seek medical care immediately.

## 2025-02-13 ENCOUNTER — PROCEDURE VISIT (OUTPATIENT)
Dept: SPORTS MEDICINE | Facility: CLINIC | Age: 70
End: 2025-02-13
Payer: MEDICARE

## 2025-02-13 DIAGNOSIS — M25.562 BILATERAL CHRONIC KNEE PAIN: ICD-10-CM

## 2025-02-13 DIAGNOSIS — M25.561 BILATERAL CHRONIC KNEE PAIN: ICD-10-CM

## 2025-02-13 DIAGNOSIS — G89.29 BILATERAL CHRONIC KNEE PAIN: ICD-10-CM

## 2025-02-13 DIAGNOSIS — M17.0 PRIMARY LOCALIZED OSTEOARTHRITIS OF KNEES, BILATERAL: Primary | ICD-10-CM

## 2025-02-13 PROCEDURE — 99499 UNLISTED E&M SERVICE: CPT | Mod: S$GLB,,, | Performed by: STUDENT IN AN ORGANIZED HEALTH CARE EDUCATION/TRAINING PROGRAM

## 2025-02-13 PROCEDURE — 20610 DRAIN/INJ JOINT/BURSA W/O US: CPT | Mod: 50,S$GLB,, | Performed by: STUDENT IN AN ORGANIZED HEALTH CARE EDUCATION/TRAINING PROGRAM

## 2025-02-13 NOTE — PATIENT INSTRUCTIONS
Assessment:  Lacey Calderón is a 70 y.o. female with a chief complaint of Injections (B knee)    Encounter Diagnoses   Name Primary?    Primary localized osteoarthritis of knees, bilateral Yes    Bilateral chronic knee pain       Plan:  Bilateral knee Synvisc injections, #3/3.    Proper protocols after the aspiration included: no submerging pools, baths tubs, or hot tubs for 24 hr.  Showering is okay today.  Red flag symptoms include fever, chills, nausea, vomiting, red, warm, tender joint at the area of injection.  If you are noticing these symptoms, they may be indicative of an infection, and please seek medical care immediately, either by calling our clinic or going to the emergency room.    Follow-up:  2 months or sooner if there are any problems between now and then.    Thank you for choosing [facility] and Dr. Braydon Root for your orthopedic & sports medicine care. It is our goal to provide you with exceptional care that will help keep you healthy, active, and get you back in the game.    Please do not hesitate to reach out to us via email, phone, or MyChart with any questions, concerns, or feedback.    If you are experiencing pain/discomfort ,or have questions after 5pm and would like to be connected to the [facility] on-call team, please call this number and specify which Sports Medicine provider is treating you: [phone]

## 2025-02-13 NOTE — PROCEDURES
Large Joint Aspiration/Injection: bilateral knee Synvisc #3/3    Date/Time: 2/13/2025 10:20 AM    Performed by: Braydon Root MD  Authorized by: Braydon Root MD    Consent Done?:  Yes (Verbal)  Indications:  Arthritis and pain  Site marked: the procedure site was marked    Timeout: prior to procedure the correct patient, procedure, and site was verified      Local anesthesia used?: Yes    Local anesthetic:  Topical anesthetic    Details:  Needle Size:  22 G  Ultrasonic Guidance for needle placement?: No    Approach:  Anterolateral  Location:  Knee  Laterality:  Bilateral  Site:  Bilateral knee  Medications (Right):  16 mg hyaluronate 16 mg/2 mL  Medications (Left):  16 mg hyaluronate 16 mg/2 mL  Patient tolerance:  Patient tolerated the procedure well with no immediate complications     We discussed the proper protocols after the injection such as no submerging pools, baths tubs, or hot tubs for 24 hr.  Showering is okay today.  We also discussed that blood sugars can be elevated after an injection and asked patient to properly check their sugars over the next few days and contact their PCP if there are any concerns.  We discussed red flags such as fevers, chills, red, warm, tender joint at the area of injection to please seek medical care immediately.

## 2025-04-04 ENCOUNTER — LAB VISIT (OUTPATIENT)
Dept: LAB | Facility: HOSPITAL | Age: 70
End: 2025-04-04
Attending: FAMILY MEDICINE
Payer: MEDICARE

## 2025-04-04 DIAGNOSIS — E11.29 TYPE 2 DIABETES MELLITUS WITH MICROALBUMINURIA, WITH LONG-TERM CURRENT USE OF INSULIN: Chronic | ICD-10-CM

## 2025-04-04 DIAGNOSIS — R80.9 TYPE 2 DIABETES MELLITUS WITH MICROALBUMINURIA, WITH LONG-TERM CURRENT USE OF INSULIN: Chronic | ICD-10-CM

## 2025-04-04 DIAGNOSIS — T50.2X5A DIURETIC-INDUCED HYPOKALEMIA: ICD-10-CM

## 2025-04-04 DIAGNOSIS — I10 ESSENTIAL HYPERTENSION: ICD-10-CM

## 2025-04-04 DIAGNOSIS — E87.6 DIURETIC-INDUCED HYPOKALEMIA: ICD-10-CM

## 2025-04-04 DIAGNOSIS — Z79.4 TYPE 2 DIABETES MELLITUS WITH MICROALBUMINURIA, WITH LONG-TERM CURRENT USE OF INSULIN: Chronic | ICD-10-CM

## 2025-04-04 DIAGNOSIS — I48.0 PAROXYSMAL ATRIAL FIBRILLATION: Chronic | ICD-10-CM

## 2025-04-04 DIAGNOSIS — E11.69 HYPERLIPIDEMIA ASSOCIATED WITH TYPE 2 DIABETES MELLITUS: Chronic | ICD-10-CM

## 2025-04-04 DIAGNOSIS — Z79.4 TYPE 2 DIABETES MELLITUS WITH OTHER SPECIFIED COMPLICATION, WITH LONG-TERM CURRENT USE OF INSULIN: Chronic | ICD-10-CM

## 2025-04-04 DIAGNOSIS — E78.5 HYPERLIPIDEMIA ASSOCIATED WITH TYPE 2 DIABETES MELLITUS: Chronic | ICD-10-CM

## 2025-04-04 DIAGNOSIS — E11.69 TYPE 2 DIABETES MELLITUS WITH OTHER SPECIFIED COMPLICATION, WITH LONG-TERM CURRENT USE OF INSULIN: Chronic | ICD-10-CM

## 2025-04-04 LAB
ALBUMIN SERPL BCP-MCNC: 3.7 G/DL (ref 3.5–5.2)
ALP SERPL-CCNC: 88 UNIT/L (ref 40–150)
ALT SERPL W/O P-5'-P-CCNC: 15 UNIT/L (ref 10–44)
ANION GAP (OHS): 10 MMOL/L (ref 8–16)
AST SERPL-CCNC: 18 UNIT/L (ref 11–45)
BILIRUB SERPL-MCNC: 0.4 MG/DL (ref 0.1–1)
BUN SERPL-MCNC: 21 MG/DL (ref 8–23)
CALCIUM SERPL-MCNC: 9.8 MG/DL (ref 8.7–10.5)
CHLORIDE SERPL-SCNC: 105 MMOL/L (ref 95–110)
CHOLEST SERPL-MCNC: 235 MG/DL (ref 120–199)
CHOLEST/HDLC SERPL: 3.5 {RATIO} (ref 2–5)
CO2 SERPL-SCNC: 26 MMOL/L (ref 23–29)
CREAT SERPL-MCNC: 0.7 MG/DL (ref 0.5–1.4)
EAG (OHS): 120 MG/DL (ref 68–131)
ERYTHROCYTE [DISTWIDTH] IN BLOOD BY AUTOMATED COUNT: 16 % (ref 11.5–14.5)
GFR SERPLBLD CREATININE-BSD FMLA CKD-EPI: >60 ML/MIN/1.73/M2
GLUCOSE SERPL-MCNC: 41 MG/DL (ref 70–110)
HBA1C MFR BLD: 5.8 % (ref 4–5.6)
HCT VFR BLD AUTO: 42.4 % (ref 37–48.5)
HDLC SERPL-MCNC: 68 MG/DL (ref 40–75)
HDLC SERPL: 28.9 % (ref 20–50)
HGB BLD-MCNC: 13.4 GM/DL (ref 12–16)
LDLC SERPL CALC-MCNC: 153.8 MG/DL (ref 63–159)
MCH RBC QN AUTO: 27.2 PG (ref 27–31)
MCHC RBC AUTO-ENTMCNC: 31.6 G/DL (ref 32–36)
MCV RBC AUTO: 86 FL (ref 82–98)
NONHDLC SERPL-MCNC: 167 MG/DL
PLATELET # BLD AUTO: 211 K/UL (ref 150–450)
PMV BLD AUTO: 12.5 FL (ref 9.2–12.9)
POTASSIUM SERPL-SCNC: 3.4 MMOL/L (ref 3.5–5.1)
PROT SERPL-MCNC: 7.9 GM/DL (ref 6–8.4)
RBC # BLD AUTO: 4.92 M/UL (ref 4–5.4)
SODIUM SERPL-SCNC: 141 MMOL/L (ref 136–145)
TRIGL SERPL-MCNC: 66 MG/DL (ref 30–150)
WBC # BLD AUTO: 6.33 K/UL (ref 3.9–12.7)

## 2025-04-04 PROCEDURE — 36415 COLL VENOUS BLD VENIPUNCTURE: CPT

## 2025-04-04 PROCEDURE — 85027 COMPLETE CBC AUTOMATED: CPT

## 2025-04-04 PROCEDURE — 82040 ASSAY OF SERUM ALBUMIN: CPT

## 2025-04-04 PROCEDURE — 83036 HEMOGLOBIN GLYCOSYLATED A1C: CPT

## 2025-04-04 PROCEDURE — 80061 LIPID PANEL: CPT

## 2025-04-05 ENCOUNTER — RESULTS FOLLOW-UP (OUTPATIENT)
Dept: INTERNAL MEDICINE | Facility: CLINIC | Age: 70
End: 2025-04-05

## 2025-04-17 ENCOUNTER — PATIENT OUTREACH (OUTPATIENT)
Dept: ADMINISTRATIVE | Facility: HOSPITAL | Age: 70
End: 2025-04-17
Payer: MEDICARE

## 2025-04-17 DIAGNOSIS — Z12.11 SCREENING FOR COLORECTAL CANCER: Primary | ICD-10-CM

## 2025-04-17 DIAGNOSIS — Z12.12 SCREENING FOR COLORECTAL CANCER: Primary | ICD-10-CM

## 2025-04-17 NOTE — PROGRESS NOTES
VBC OUTREACH: per chart review pt DOES NOT MEET CRITERIA FOR VBC PROGRAM, and is Overdue for Mammogram and Colonoscopy, spoke with pt she agreed to do both, scheduled and placed Endo referral.

## 2025-04-22 ENCOUNTER — TELEPHONE (OUTPATIENT)
Dept: SPORTS MEDICINE | Facility: CLINIC | Age: 70
End: 2025-04-22
Payer: MEDICARE

## 2025-04-22 NOTE — TELEPHONE ENCOUNTER
----- Message from Med Assistant Knowles sent at 4/22/2025 10:18 AM CDT -----  Contact: [phone]  Pt Running late to ApptCaller:Lacey(pt)Reason For call:pt is calling to let staff know that her transportation haven arrive yet and is running late to appt.Best Call Back:[phone]

## 2025-04-22 NOTE — TELEPHONE ENCOUNTER
Spoke with pt regarding appt today with Dr. Root. Pt stated that transportation would take additional time than usual due to being down to one. Pt stated she would like to r/s appt. Appt r/s to 5/8 with Dr. Root. Pt confirmed new time and thankful for help.

## 2025-04-25 ENCOUNTER — E-CONSULT (OUTPATIENT)
Dept: CARDIOLOGY | Facility: CLINIC | Age: 70
End: 2025-04-25
Payer: MEDICARE

## 2025-04-25 ENCOUNTER — HOSPITAL ENCOUNTER (OUTPATIENT)
Dept: PREADMISSION TESTING | Facility: HOSPITAL | Age: 70
Discharge: HOME | End: 2025-04-25
Attending: FAMILY MEDICINE
Payer: MEDICARE

## 2025-04-25 DIAGNOSIS — Z01.810 PREOP CARDIOVASCULAR EXAM: Primary | ICD-10-CM

## 2025-04-25 DIAGNOSIS — Z12.12 SCREENING FOR COLORECTAL CANCER: Primary | ICD-10-CM

## 2025-04-25 DIAGNOSIS — Z12.11 SCREENING FOR COLORECTAL CANCER: Primary | ICD-10-CM

## 2025-04-25 RX ORDER — POLYETHYLENE GLYCOL 3350, SODIUM SULFATE ANHYDROUS, SODIUM BICARBONATE, SODIUM CHLORIDE, POTASSIUM CHLORIDE 236; 22.74; 6.74; 5.86; 2.97 G/4L; G/4L; G/4L; G/4L; G/4L
4 POWDER, FOR SOLUTION ORAL ONCE
Qty: 4000 ML | Refills: 0 | Status: SHIPPED | OUTPATIENT
Start: 2025-04-25 | End: 2025-04-25

## 2025-04-25 NOTE — CONSULTS
[facility]  Response for E-Consult     Patient Name: Lacey Calderón  MRN: [id number]  Primary Care Provider: SURINDER Merrill MD   Requesting Provider: Anastasia Tillman FNP  E-Consult to General Cardiology  Consult performed by: David Whitaker MD  Consult ordered by: Anastasia Tillman FNP          E consult for preop clearance of colonoscopy  The chart reviewed.  PMH afib htn hld dm  Gfr> 60      Plan  Elevated periop risk of CV events for non-high risk procedure.  Ok to proceed the scheduled procedure without further cardiac study.  OK to hold Eliquis 2 days before the procedure and resume postop once hemodynamically stable      Total time of Consultation: 10 minute    I did not speak to the requesting provider verbally about this.     *This eConsult is based on the clinical data available to me and is furnished without benefit of a physical examination. The eConsult will need to be interpreted in light of any clinical issues or changes in patient status not available to me at the time of filing this eConsults. Significant changes in patient condition or level of acuity should result in immediate formal consultation and reevaluation. Please alert me if you have further questions.    Thank you for this eConsult referral.     David Whitaker MD  [facility]

## 2025-05-15 ENCOUNTER — HOSPITAL ENCOUNTER (OUTPATIENT)
Dept: ENDOSCOPY | Facility: HOSPITAL | Age: 70
Discharge: HOME | End: 2025-05-15
Attending: FAMILY MEDICINE

## 2025-05-16 ENCOUNTER — ANESTHESIA EVENT (OUTPATIENT)
Dept: ENDOSCOPY | Facility: HOSPITAL | Age: 70
End: 2025-05-16
Payer: MEDICARE

## 2025-05-16 NOTE — ANESTHESIA PREPROCEDURE EVALUATION
05/16/2025  Lacey Calderón is a 70 y.o., female.  Past Medical History:   Diagnosis Date    Arthritis     Cardiac arrhythmia 11/13/2020    Cataract     Class 2 severe obesity due to excess calories with serious comorbidity and body mass index (BMI) of 37.0 to 37.9 in adult 10/19/2020    Essential hypertension 10/19/2020    Hyperlipidemia complicating type 2 diabetes 10/24/2020    Hypertension     Paroxysmal atrial fibrillation 11/13/2020    Primary localized osteoarthritis of knees, bilateral 10/19/2020    Pure hypercholesterolemia 10/24/2020    Pure hypercholesterolemia 10/24/2020    Type 2 diabetes mellitus with hyperglycemia, with long-term current use of insulin 6/3/2021    Type 2 diabetes mellitus with other specified complication 6/3/2021           Pre-op Assessment    I have reviewed the Patient Summary Reports.     I have reviewed the Nursing Notes. I have reviewed the NPO Status.   I have reviewed the Medications.     Review of Systems  Anesthesia Hx:  No problems with previous Anesthesia             Denies Family Hx of Anesthesia complications.    Denies Personal Hx of Anesthesia complications.                    Social:  Non-Smoker, No Alcohol Use       Cardiovascular:     Hypertension    Dysrhythmias atrial fibrillation      hyperlipidemia    2023 Echo EF 65%                           Hepatic/GI:  Bowel Prep.                   Musculoskeletal:  Arthritis               Endocrine:  Diabetes, type 2         Obesity / BMI > 30      Physical Exam  General: Well nourished, Cooperative, Alert and Oriented    Airway:  Mallampati: II   Mouth Opening: Normal  TM Distance: Normal  Tongue: Normal  Neck ROM: Normal ROM        Anesthesia Plan  Type of Anesthesia, risks & benefits discussed:    Anesthesia Type: Gen Natural Airway  Intra-op Monitoring Plan: Standard ASA Monitors  Post Op Pain Control Plan:  multimodal analgesia  Induction:  IV  Informed Consent: Informed consent signed with the Patient and all parties understand the risks and agree with anesthesia plan.  All questions answered. Patient consented to blood products? No  ASA Score: 3  Day of Surgery Review of History & Physical: H&P Update referred to the surgeon/provider.    Ready For Surgery From Anesthesia Perspective.     .

## 2025-05-20 ENCOUNTER — PATIENT MESSAGE (OUTPATIENT)
Dept: GASTROENTEROLOGY | Facility: HOSPITAL | Age: 70
End: 2025-05-20
Payer: MEDICARE

## 2025-05-20 ENCOUNTER — HOSPITAL ENCOUNTER (OUTPATIENT)
Dept: ENDOSCOPY | Facility: HOSPITAL | Age: 70
Discharge: HOME | End: 2025-05-20
Attending: FAMILY MEDICINE
Payer: MEDICARE

## 2025-05-20 ENCOUNTER — ANESTHESIA (OUTPATIENT)
Dept: ENDOSCOPY | Facility: HOSPITAL | Age: 70
End: 2025-05-20
Payer: MEDICARE

## 2025-05-20 VITALS
HEIGHT: 66 IN | TEMPERATURE: 97 F | RESPIRATION RATE: 20 BRPM | BODY MASS INDEX: 30.85 KG/M2 | HEART RATE: 60 BPM | WEIGHT: 191.94 LBS | OXYGEN SATURATION: 95 % | SYSTOLIC BLOOD PRESSURE: 97 MMHG | DIASTOLIC BLOOD PRESSURE: 61 MMHG

## 2025-05-20 DIAGNOSIS — Z12.11 SCREENING FOR COLORECTAL CANCER: ICD-10-CM

## 2025-05-20 DIAGNOSIS — Z86.0100 HISTORY OF COLON POLYPS: Primary | ICD-10-CM

## 2025-05-20 DIAGNOSIS — Z12.12 SCREENING FOR COLORECTAL CANCER: ICD-10-CM

## 2025-05-20 DIAGNOSIS — D12.3 ADENOMATOUS POLYP OF TRANSVERSE COLON: ICD-10-CM

## 2025-05-20 LAB — POCT GLUCOSE: 113 MG/DL (ref 70–110)

## 2025-05-20 PROCEDURE — 88305 TISSUE EXAM BY PATHOLOGIST: CPT | Mod: TC | Performed by: INTERNAL MEDICINE

## 2025-05-20 PROCEDURE — 37000009 HC ANESTHESIA EA ADD 15 MINS

## 2025-05-20 PROCEDURE — 63600175 PHARM REV CODE 636 W HCPCS: Performed by: NURSE ANESTHETIST, CERTIFIED REGISTERED

## 2025-05-20 PROCEDURE — 27201089 HC SNARE, DISP (ANY): Performed by: INTERNAL MEDICINE

## 2025-05-20 PROCEDURE — 37000008 HC ANESTHESIA 1ST 15 MINUTES

## 2025-05-20 RX ORDER — LIDOCAINE HYDROCHLORIDE 20 MG/ML
INJECTION INTRAVENOUS
Status: DISCONTINUED | OUTPATIENT
Start: 2025-05-20 | End: 2025-05-20

## 2025-05-20 RX ORDER — PROPOFOL 10 MG/ML
INJECTION, EMULSION INTRAVENOUS
Status: DISCONTINUED | OUTPATIENT
Start: 2025-05-20 | End: 2025-05-20

## 2025-05-20 RX ORDER — SODIUM CHLORIDE, SODIUM LACTATE, POTASSIUM CHLORIDE, CALCIUM CHLORIDE 600; 310; 30; 20 MG/100ML; MG/100ML; MG/100ML; MG/100ML
INJECTION, SOLUTION INTRAVENOUS CONTINUOUS PRN
Status: DISCONTINUED | OUTPATIENT
Start: 2025-05-20 | End: 2025-05-20

## 2025-05-20 RX ADMIN — PROPOFOL 50 MG: 10 INJECTION, EMULSION INTRAVENOUS at 11:05

## 2025-05-20 RX ADMIN — PROPOFOL 30 MG: 10 INJECTION, EMULSION INTRAVENOUS at 11:05

## 2025-05-20 RX ADMIN — PROPOFOL 80 MG: 10 INJECTION, EMULSION INTRAVENOUS at 11:05

## 2025-05-20 RX ADMIN — LIDOCAINE HYDROCHLORIDE 50 MG: 20 INJECTION INTRAVENOUS at 11:05

## 2025-05-20 RX ADMIN — SODIUM CHLORIDE, SODIUM LACTATE, POTASSIUM CHLORIDE, AND CALCIUM CHLORIDE: .6; .31; .03; .02 INJECTION, SOLUTION INTRAVENOUS at 11:05

## 2025-05-20 NOTE — BRIEF OP NOTE
Endoscopy Discharge Summary      Admit Date: 5/20/2025    Discharge Date and Time:  5/20/2025 11:22 AM    Attending Physician: Sal Shook     Discharge Physician: Sal Shook      Principal Admitting Diagnoses: History of colon polyps         Discharge Diagnosis: The primary encounter diagnosis was History of colon polyps. Diagnoses of Screening for colorectal cancer and Adenomatous polyp of transverse colon were also pertinent to this visit.     Discharged Condition: Good    Indication for Admission: History of colon polyps     Hospital Course: Patient was admitted for an inpatient procedure and tolerated the procedure well with no complications.    Significant Diagnostic Studies:  COLON    Pathology (if any):  Specimen (24h ago, onward)       Start     Ordered    05/20/25 1118  Specimen to Pathology Gastrointestinal tract  RELEASE UPON ORDERING        References:    Click here for ordering Quick Tip   Question:  Release to patient  Answer:  Immediate    05/20/25 1118                    Disposition: Home.    Bleeding: None    No Implants         Follow Up/Patient Instructions:   Patient's Medications   New Prescriptions    No medications on file   Previous Medications    AMLODIPINE (NORVASC) 10 MG TABLET    Take 1 tablet by mouth once daily    APIXABAN (ELIQUIS) 5 MG TAB    Take 1 tablet by mouth twice daily    BLOOD SUGAR DIAGNOSTIC STRP    Check blood glucose 4 times daily (BEFORE breakfast or other meal) and as needed for symptoms of low or high blood glucose    BLOOD-GLUCOSE METER KIT    Check blood glucose 4 times daily (BEFORE breakfast or other meal) and as needed for symptoms of low or high blood glucose    CALCIUM CARBONATE-VITAMIN D3 (CALCIUM 600 WITH VITAMIN D3) 600 MG-12.5 MCG (500 UNIT) CAP    Take by mouth 2 (two) times a day.    CARVEDILOL (COREG) 6.25 MG TABLET    Take 1 tablet (6.25 mg total) by mouth 2 (two) times daily.    CHLORTHALIDONE (HYGROTEN) 25 MG TAB    Take 1 tablet (25 mg total) by  "mouth once daily.    DORZOLAMIDE-TIMOLOL 2-0.5% (COSOPT) 22.3-6.8 MG/ML OPHTHALMIC SOLUTION    Place 1 drop into both eyes every 12 (twelve) hours.    EMPTY CONTAINER (SHARPS CONTAINER) MISC    Use to dispose sharps    IBUPROFEN (ADVIL,MOTRIN) 600 MG TABLET    Take 600 mg by mouth 3 (three) times daily.    INSULIN LISPRO 100 UNIT/ML PEN    INJECT 9 UNITS SUBCUTANEOUSLY THREE TIMES DAILY BEFORE MEAL(S)    KETOROLAC 0.5% (ACULAR) 0.5 % DROP    Place 1 drop into the left eye 4 (four) times daily.    LANCETS MISC    Check blood glucose 4 times daily (BEFORE breakfast or other meal) and as needed for symptoms of low or high blood glucose    LANTUS SOLOSTAR U-100 INSULIN 100 UNIT/ML (3 ML) INPN PEN    INJECT 30 UNITS SUBCUTANEOUSLY NIGHTLY    METFORMIN (GLUCOPHAGE-XR) 500 MG ER 24HR TABLET    Take 1 tablet (500 mg total) by mouth once daily.    PEN NEEDLE, DIABETIC (BD ULTRA-FINE SHORT PEN NEEDLE) 31 GAUGE X 5/16" NDLE    USE AS DIRECTED TO  INJECT  INSULIN  AS  PRESCRIBED  BY  [facility]  PROVIDER Strength: 31 gauge x 5/16"    POTASSIUM CHLORIDE SA (K-DUR,KLOR-CON M) 10 MEQ TABLET    Take 1 tablet (10 mEq total) by mouth once daily.    PREDNISOLONE ACETATE (PRED FORTE) 1 % DRPS    Place 1 drop into the left eye 4 (four) times daily.    ROSUVASTATIN (CRESTOR) 20 MG TABLET    Take 1 tablet (20 mg total) by mouth every evening.    RSVPREF3 ANTIGEN-AS01E, PF, (AREXVY) 120 MCG/0.5 ML SUSR VACCINE    Pharmacist inject 0.5 mL intramuscular for one dose according to CDC guidance and pharmacy protocol.    VALSARTAN (DIOVAN) 320 MG TABLET    Take 1 tablet (320 mg total) by mouth once daily.   Modified Medications    No medications on file   Discontinued Medications    DIPH,PERTUSS,ACEL,TET-VAC,(ADACEL,TDAP ADOLESN/ADULT,PF)2 LF-(2.5-5-3-5 MCG)-5LF/0.5 ML SYRG           Discharge Procedure Orders   Diet general     No dressing needed     Call MD for:  temperature >100.4     Call MD for:  persistent nausea and vomiting     Call MD for: "  severe uncontrolled pain     Call MD for:  difficulty breathing, headache or visual disturbances     Call MD for:  redness, tenderness, or signs of infection (pain, swelling, redness, odor or green/yellow discharge around incision site)     Call MD for:  hives     Call MD for:  persistent dizziness or light-headedness        Follow-up Information       SURINDER Merrill MD Follow up.    Specialty: Family Medicine  Contact information:  [id number]  [place] [address] [phone]

## 2025-05-20 NOTE — ANESTHESIA POSTPROCEDURE EVALUATION
Anesthesia Post Evaluation    Patient: Lacey Calderón    Procedure(s) Performed: * No procedures listed *    Final Anesthesia Type: general      Patient location during evaluation: PACU  Patient participation: Yes- Able to Participate  Level of consciousness: awake  Post-procedure vital signs: reviewed and stable  Pain management: adequate  Airway patency: patent    PONV status at discharge: No PONV  Anesthetic complications: no      Cardiovascular status: stable  Respiratory status: unassisted  Hydration status: euvolemic  Follow-up not needed.              Vitals Value Taken Time   /70 05/20/25 11:29     05/20/25 11:32   Pulse 69 05/20/25 11:31   Resp 26 05/20/25 11:31   SpO2 98 % 05/20/25 11:31   Vitals shown include unfiled device data.      No case tracking events are documented in the log.      Pain/Silas Score: Silas Score: 10 (5/20/2025 11:31 AM)

## 2025-05-20 NOTE — TRANSFER OF CARE
"Anesthesia Transfer of Care Note    Patient: Lacey Calderón    Procedure(s) Performed: * No procedures listed *    Patient location: PACU    Anesthesia Type: general    Transport from OR: Transported from OR on room air with adequate spontaneous ventilation    Post pain: adequate analgesia    Post assessment: no apparent anesthetic complications    Post vital signs: stable    Level of consciousness: awake    Nausea/Vomiting: no nausea/vomiting    Complications: none    Transfer of care protocol was followed      Last vitals: Visit Vitals  BP (!) 140/87 (BP Location: Right arm, Patient Position: Sitting)   Pulse 72   Temp 36.3 °C (97.3 °F) (Temporal)   Resp 16   Ht 5' 6" (1.676 m)   Wt 87.1 kg (191 lb 14.6 oz)   SpO2 100%   Breastfeeding No   BMI 30.98 kg/m²     "

## 2025-05-20 NOTE — H&P
[facility]  Gastroenterology  H&P    Patient Name: Lacey Calderón  MRN: [id number]  Admission Date: 5/20/2025  Code Status: No Order    Attending Provider: Sal Shook MD  Primary Care Physician: SURINDER Merrill MD  Principal Problem:History of colon polyps    Subjective:     History of Present Illness/Reasons for procedure(s):   1. History of colon polyps    2. Screening for colorectal cancer          Past Medical History:   Diagnosis Date    Arthritis     Cardiac arrhythmia 11/13/2020    Cataract     Class 2 severe obesity due to excess calories with serious comorbidity and body mass index (BMI) of 37.0 to 37.9 in adult 10/19/2020    Essential hypertension 10/19/2020    Hyperlipidemia complicating type 2 diabetes 10/24/2020    Hypertension     Paroxysmal atrial fibrillation 11/13/2020    Primary localized osteoarthritis of knees, bilateral 10/19/2020    Pure hypercholesterolemia 10/24/2020    Pure hypercholesterolemia 10/24/2020    Type 2 diabetes mellitus with hyperglycemia, with long-term current use of insulin 6/3/2021    Type 2 diabetes mellitus with other specified complication 6/3/2021       Medications Ordered Prior to Encounter[1]    Past Surgical History:   Procedure Laterality Date    COLONOSCOPY N/A 12/7/2023    Procedure: COLONOSCOPY;  Surgeon: Nathan Mendoza MD;  [facility];  Service: Endoscopy;  Laterality: N/A;    MULTIPLE TOOTH EXTRACTIONS         Review of patient's allergies indicates:  No Known Allergies  Family History       Problem Relation (Age of Onset)    Arthritis Mother    Cataracts Mother    Dementia Mother    Glaucoma Mother    Heart disease Mother    Miscarriages / Stillbirths Mother    No Known Problems Father          Tobacco Use    Smoking status: Never    Smokeless tobacco: Never   Substance and Sexual Activity    Alcohol use: Never    Drug use: Never    Sexual activity: Not Currently     Partners: Male       Objective:     Vital Signs (Most  Recent):  Temp: 97.3 °F (36.3 °C) (05/20/25 0940)  Pulse: 72 (05/20/25 0940)  Resp: 16 (05/20/25 0940)  BP: (!) 140/87 (05/20/25 0940)  SpO2: 100 % (05/20/25 0940) Vital Signs (24h Range):  Temp:  [97.3 °F (36.3 °C)] 97.3 °F (36.3 °C)  Pulse:  [72] 72  Resp:  [16] 16  SpO2:  [100 %] 100 %  BP: (140)/(87) 140/87     Weight: 87.1 kg (191 lb 14.6 oz) (05/20/25 0940)  Body mass index is 30.98 kg/m².    No intake or output data in the 24 hours ending 05/20/25 1029    Lines/Drains/Airways       Peripheral Intravenous Line  Duration                  Peripheral IV - Single Lumen 05/20/25 0948 22 G Anterior;Right Forearm <1 day                    Physical Exam  Constitutional:       Appearance: She is well-developed.   HENT:      Head: Normocephalic and atraumatic.   Eyes:      Pupils: Pupils are equal, round, and reactive to light.   Neck:      Thyroid: No thyromegaly.   Cardiovascular:      Rate and Rhythm: Normal rate and regular rhythm.      Heart sounds: Normal heart sounds. No murmur heard.  Pulmonary:      Effort: Pulmonary effort is normal. No respiratory distress.      Breath sounds: Normal breath sounds. No wheezing or rales.   Abdominal:      General: Bowel sounds are normal. There is no distension.      Palpations: Abdomen is soft. There is no mass.      Tenderness: There is no abdominal tenderness.   Musculoskeletal:         General: No tenderness. Normal range of motion.      Cervical back: Normal range of motion and neck supple.   Lymphadenopathy:      Cervical: No cervical adenopathy.   Skin:     General: Skin is warm and dry.      Findings: No erythema.   Neurological:      Mental Status: She is alert and oriented to person, place, and time.      Cranial Nerves: No cranial nerve deficit.      Deep Tendon Reflexes: Reflexes are normal and symmetric.   Psychiatric:         Behavior: Behavior normal.             Latest Ref Rng & Units 10/18/2021    10:04 AM 1/31/2022    10:39 AM 8/18/2022     7:35 AM 9/29/2022     10:40 AM 2/9/2023     7:50 AM 7/6/2023     9:50 AM 10/23/2024     8:58 AM   Lab Summary   Hemoglobin 12.0 - 16.0 g/dL (None) (None) (None) (None) 12.5  (None) (None)   Hematocrit 37.0 - 48.5 % (None) (None) (None) (None) 39.3  (None) (None)   White count 3.90 - 12.70 K/uL (None) (None) (None) (None) 5.67  (None) (None)   Platelet count 150 - 450 K/uL (None) (None) (None) (None) 262  (None) (None)   Sodium 136 - 145 mmol/L 139  137  142  140  142  142  (None)   Potassium 3.5 - 5.1 mmol/L 3.8  3.2  3.8  4.4  3.4  4.0  (None)   Calcium 8.7 - 10.5 mg/dL 10.4  10.0  10.4  10.5  9.9  9.8  (None)   Phosphorus  (None) (None) (None) (None) (None) (None) (None)   Creatinine 0.5 - 1.4 mg/dL 0.8  0.8  0.8  0.8  0.8  0.8  (None)   AST 10 - 40 U/L (None) 31  16  (None) 16  19  (None)   Alk Phos 55 - 135 U/L (None) 70  77  (None) 73  63  (None)   Bilirubin 0.1 - 1.0 mg/dL (None) 0.5  0.5  (None) 0.4  0.4  (None)   Glucose 70 - 110 mg/dL 98  138  90  94  85  61  (None)   Cholesterol 120 - 199 mg/dL (None) 196  169  (None) (None) 217  248    HDL cholesterol 40 - 75 mg/dL (None) 49  60  (None) (None) 63  56    Triglycerides 30 - 150 mg/dL (None) 101  77  (None) (None) 80  97    LDL calc  (None) (None) (None) (None) (None) (None) (None)   Total protein 6.0 - 8.4 g/dL (None) 7.7  7.5  (None) 6.8  7.1  (None)   Albumin 3.5 - 5.2 g/dL (None) 3.5  4.0  (None) 3.8  3.6  (None)   A1C  (None) (None) (None) (None) (None) (None) (None)   PSA Screen  (None) (None) (None) (None) (None) (None) (None)        Assessment/Plan:     Active Diagnoses:    Diagnosis Date Noted POA    PRINCIPAL PROBLEM:  History of colon polyps [Z86.0100] 05/20/2025 Not Applicable      Problems Resolved During this Admission:       Risks, benefits and alternative options were discussed with the patient. Risks including but not limited to infection, bleeding, heart or respiratory problems and perforation that may require surgery were all explained to the patient. The  patient had a chance for questions if there were doubts or concerns about the test. The referring provider will be notified that our consultation is complete and available through the patient's records.    Thanks for letting us participate in the care of this nice patient,      Sal Shook MD  Gastroenterology  [facility] [place] [facility]         [1]   Current Outpatient Medications on File Prior to Encounter   Medication Sig Dispense Refill    amLODIPine (NORVASC) 10 MG tablet Take 1 tablet by mouth once daily 90 tablet 3    carvediloL (COREG) 6.25 MG tablet Take 1 tablet (6.25 mg total) by mouth 2 (two) times daily. 180 tablet 2    chlorthalidone (HYGROTEN) 25 MG Tab Take 1 tablet (25 mg total) by mouth once daily. 90 tablet 2    LANTUS SOLOSTAR U-100 INSULIN 100 unit/mL (3 mL) InPn pen INJECT 30 UNITS SUBCUTANEOUSLY NIGHTLY 30 mL 0    metFORMIN (GLUCOPHAGE-XR) 500 MG ER 24hr tablet Take 1 tablet (500 mg total) by mouth once daily. 90 tablet 3    rosuvastatin (CRESTOR) 20 MG tablet Take 1 tablet (20 mg total) by mouth every evening. 90 tablet 3    valsartan (DIOVAN) 320 MG tablet Take 1 tablet (320 mg total) by mouth once daily. 90 tablet 3    apixaban (ELIQUIS) 5 mg Tab Take 1 tablet by mouth twice daily 180 tablet 3    blood sugar diagnostic Strp Check blood glucose 4 times daily (BEFORE breakfast or other meal) and as needed for symptoms of low or high blood glucose 200 each 11    blood-glucose meter kit Check blood glucose 4 times daily (BEFORE breakfast or other meal) and as needed for symptoms of low or high blood glucose 1 each 0    calcium carbonate-vitamin D3 (CALCIUM 600 WITH VITAMIN D3) 600 mg-12.5 mcg (500 unit) Cap Take by mouth 2 (two) times a day.      DIPH,PERTUSS,ACEL,TET-VAC,(ADACEL,TDAP ADOLESN/ADULT,PF)2 Lf-(2.5-5-3-5 mcg)-5Lf/0.5 mL Syrg  (Patient not taking: Reported on 9/3/2024)      dorzolamide-timolol 2-0.5% (COSOPT) 22.3-6.8 mg/mL ophthalmic solution Place 1 drop into both eyes every  "12 (twelve) hours. 30 mL 6    empty container (SHARPS CONTAINER) Misc Use to dispose sharps      ibuprofen (ADVIL,MOTRIN) 600 MG tablet Take 600 mg by mouth 3 (three) times daily.      insulin lispro 100 unit/mL pen INJECT 9 UNITS SUBCUTANEOUSLY THREE TIMES DAILY BEFORE MEAL(S) 15 mL 5    ketorolac 0.5% (ACULAR) 0.5 % Drop Place 1 drop into the left eye 4 (four) times daily. 5 mL 0    lancets Misc Check blood glucose 4 times daily (BEFORE breakfast or other meal) and as needed for symptoms of low or high blood glucose 200 each 11    pen needle, diabetic (BD ULTRA-FINE SHORT PEN NEEDLE) 31 gauge x 5/16" Ndle USE AS DIRECTED TO  INJECT  INSULIN  AS  PRESCRIBED  BY  [facility]  PROVIDER Strength: 31 gauge x 5/16" 200 each 3    potassium chloride SA (K-DUR,KLOR-CON M) 10 MEQ tablet Take 1 tablet (10 mEq total) by mouth once daily. 90 tablet 3    prednisoLONE acetate (PRED FORTE) 1 % DrpS Place 1 drop into the left eye 4 (four) times daily. 5 mL 1    RSVPreF3 antigen-AS01E, PF, (AREXVY) 120 mcg/0.5 mL SusR vaccine Pharmacist inject 0.5 mL intramuscular for one dose according to CDC guidance and pharmacy protocol. 0.5 mL 0     No current facility-administered medications on file prior to encounter.     "

## 2025-05-20 NOTE — PLAN OF CARE
Discharge instructions reviewed with pt, handouts given, verbalized understanding with no further questions at this time. Dr. Shook  spoke to pt at bedside, reviewed procedure and answered questions aware they are awaiting biopsy results with MD telephone number provided per AVS sheet. VSS on RA, no pain or nausea noted, tolerating po fluids without difficulty, no other complaints noted. Fall precautions reviewed, consents in chart.

## 2025-05-20 NOTE — PLAN OF CARE
Pt prepped for procedure, daughter at bedside visiting with pt. No complaints at this time will continue to monitor.

## 2025-05-20 NOTE — DISCHARGE INSTRUCTIONS
05/20/2025    Dear Lacey Calderón,     Below are important post-procedure care instructions to help ensure a smooth recovery.    General Post-Procedure Care    ? Discharge: You have been discharged home in stable condition.  ? Supervision: A responsible adult should stay with you for the next 12-24 hours.  ? Activity Restrictions:    You may feel drowsy due to sedation; avoid driving, operating heavy machinery, making important decisions, or drinking alcohol for the rest of the day.  Resume normal activities tomorrow unless otherwise directed by your doctor.  Diet & Hydration    ? After Upper Endoscopy (EGD):    Start with clear liquids and soft foods. You may gradually return to your normal diet as tolerated.  Avoid hot, spicy, or acidic foods for the rest of the day to prevent throat irritation.    ? After Colonoscopy:    Begin with a light meal and plenty of fluids.  Avoid heavy, greasy, or spicy foods for the first 24 hours to minimize stomach upset.  Resume fiber intake gradually to avoid bloating or discomfort.  Common Post-Procedure Symptoms    It is normal to experience:  ? Mild sore throat or hoarseness (should improve within 24 hours).  ? Bloating, gas, or mild cramping due to air introduced during the colonoscopy.  ? Some fatigue or grogginess from sedation.  ? A small amount of rectal bleeding (especially if polyps were removed).    Warning Signs - When to Call    Please seek immediate medical attention or call [insert emergency contact number] if you experience:  ?? Severe or persistent chest or abdominal pain.  ?? Difficulty breathing or swallowing.  ?? Vomiting blood or passing large amounts of blood in stool.  ?? Fever over 101°F or signs of infection.  ?? Severe dizziness or fainting.  ?? Persistent nausea or vomiting.    Follow-Up & Results    ?? If biopsies were taken or polyps removed, we will contact you with results via [email or web address] or via phone call.  ?? If you have any questions or  "concerns, please contact our office at [phone].    We recommend the following:  Psyllium husk daily.  Magnesium Glycinate daily.  Ground Flaxseed daily.  Probiotics (Florastor or align) daily.     We genuinely value your feedback and believe that it plays a crucial role in our pursuit of excellence. We kindly request you to take a few minutes of your time to share your experience with us by posting a [employer] review. Your honest thoughts and opinions can make a significant difference for others seeking healthcare services and will help us better understand how we can further enhance our patient care.    Thank you for trusting us with your care,          Sal Shook M.D.  click on the link for contact information.           At [facility] we offer virtual visits. Please use your [facility]ia to schedule a virtual visit.     To rate your experience with Dr. Shook, click on this link    To post a review, simply follow these quick steps:  1. Visit [employer] or search for my name on Google.  2. Click on the "Write a review" button on the left-hand side of the page.  3. Rate your experience by choosing the number of stars that reflect your satisfaction.  4. Share your thoughts and insights about your visit - we'd love to hear your feedback!        "

## 2025-05-22 ENCOUNTER — RESULTS FOLLOW-UP (OUTPATIENT)
Dept: GASTROENTEROLOGY | Facility: CLINIC | Age: 70
End: 2025-05-22

## 2025-05-22 LAB
ESTROGEN SERPL-MCNC: NORMAL PG/ML
INSULIN SERPL-ACNC: NORMAL U[IU]/ML
LAB AP CLINICAL INFORMATION: NORMAL
LAB AP GROSS DESCRIPTION: NORMAL
LAB AP PERFORMING LOCATION(S): NORMAL
LAB AP REPORT FOOTNOTES: NORMAL

## 2025-05-22 NOTE — PROGRESS NOTES
"Subject: Follow-up and Recommendations After Your Colonoscopy    Dear Lacey Calderón,    I want to extend my gratitude for choosing [facility] for your recent colonoscopy. It was a pleasure to be able to provide you with our care.    During the colonoscopy, we identified polyps in your colon. I'd like to reassure you that these polyps were completely removed during the procedure, and they were subsequently sent to our pathologist for detailed microscopic examination. The pathology results have revealed that the colon polyps were classified as "adenomas."    I want to assure you that no immediate further action is required at this point. Adenomas are a type of polyp that, while not cancerous, do carry a higher risk of developing into colon cancer over time. It's important to note that adenomatous polyps are the most common type of neoplastic polyps, comprising about two-thirds of all colonic polyps. The majority of colorectal cancers originate from adenomas, but the progression of adenomas to cancer occurs in only a small fraction of cases (5 percent or less). Studies suggest that the transition from adenomas to cancer takes approximately 7 to 10 years, with a higher risk associated with advanced adenomas, also known as tubular adenomas.    Given these findings and to ensure your ongoing health and well-being, I recommend a repeat colonoscopy in 5 years for continued monitoring and close surveillance. The timing of follow-up colonoscopies is determined by factors such as the number, size, and microscopic characteristics of the polyps, as well as any additional risk factors.    If you have any questions or concerns, please don't hesitate to reach out. You can contact me through [facility] or by calling [phone] and asking for [facility] [place] [facility]. I do my best to respond to messages twice a day, usually within 48 hours. However, if you believe your matter is urgent, please do " not hesitate to contact our department or your local .      Thank you once again for choosing [facility]. We look forward to continuing to provide you with exceptional care.    Sincerely,    Sal Shook M.D.  [facility]  Contact: [phone]

## 2025-06-09 DIAGNOSIS — I10 ESSENTIAL HYPERTENSION: Chronic | ICD-10-CM

## 2025-06-09 RX ORDER — AMLODIPINE BESYLATE 10 MG/1
10 TABLET ORAL
Qty: 90 TABLET | Refills: 3 | Status: SHIPPED | OUTPATIENT
Start: 2025-06-09

## 2025-06-09 RX ORDER — APIXABAN 5 MG/1
5 TABLET, FILM COATED ORAL 2 TIMES DAILY
Qty: 180 TABLET | Refills: 2 | Status: SHIPPED | OUTPATIENT
Start: 2025-06-09

## 2025-06-11 ENCOUNTER — HOSPITAL ENCOUNTER (OUTPATIENT)
Dept: RADIOLOGY | Facility: HOSPITAL | Age: 70
Discharge: HOME | End: 2025-06-11
Attending: FAMILY MEDICINE
Payer: MEDICARE

## 2025-06-11 VITALS — WEIGHT: 192 LBS | BODY MASS INDEX: 30.86 KG/M2 | HEIGHT: 66 IN

## 2025-06-11 DIAGNOSIS — Z12.31 ENCOUNTER FOR SCREENING MAMMOGRAM FOR BREAST CANCER: ICD-10-CM

## 2025-06-11 PROCEDURE — 77067 SCR MAMMO BI INCL CAD: CPT | Mod: 26,,, | Performed by: RADIOLOGY

## 2025-06-11 PROCEDURE — 77067 SCR MAMMO BI INCL CAD: CPT | Mod: TC

## 2025-06-11 PROCEDURE — 77063 BREAST TOMOSYNTHESIS BI: CPT | Mod: 26,,, | Performed by: RADIOLOGY

## 2025-06-14 RX ORDER — CHLORTHALIDONE 25 MG/1
25 TABLET ORAL
Qty: 90 TABLET | Refills: 0 | Status: SHIPPED | OUTPATIENT
Start: 2025-06-14

## 2025-07-07 DIAGNOSIS — R80.9 TYPE 2 DIABETES MELLITUS WITH MICROALBUMINURIA, WITH LONG-TERM CURRENT USE OF INSULIN: Chronic | ICD-10-CM

## 2025-07-07 DIAGNOSIS — E11.29 TYPE 2 DIABETES MELLITUS WITH MICROALBUMINURIA, WITH LONG-TERM CURRENT USE OF INSULIN: Chronic | ICD-10-CM

## 2025-07-07 DIAGNOSIS — E11.69 TYPE 2 DIABETES MELLITUS WITH OTHER SPECIFIED COMPLICATION, WITH LONG-TERM CURRENT USE OF INSULIN: Chronic | ICD-10-CM

## 2025-07-07 DIAGNOSIS — Z79.4 TYPE 2 DIABETES MELLITUS WITH MICROALBUMINURIA, WITH LONG-TERM CURRENT USE OF INSULIN: Chronic | ICD-10-CM

## 2025-07-07 DIAGNOSIS — Z79.4 TYPE 2 DIABETES MELLITUS WITH OTHER SPECIFIED COMPLICATION, WITH LONG-TERM CURRENT USE OF INSULIN: Chronic | ICD-10-CM

## 2025-07-07 NOTE — TELEPHONE ENCOUNTER
Care Due:                  Date            Visit Type   Department     Provider  --------------------------------------------------------------------------------                                EP -                              PRIMARY      [facility] INTER[facility]  Last Visit: 08-      CARE ([facility])   MEDICINE       Feliciano Merrill  Next Visit: None Scheduled  None         None Found                                                            Last  Test          Frequency    Reason                     Performed    Due Date  --------------------------------------------------------------------------------    Office Visit  15 months..  KALIE, chlorthalidone...  08- 11-    HBA1C.......  6 months...  LANTUS...................  04-   10-    [facility] Embedded Care Due Messages. Reference number: [id number].   7/07/2025 5:34:02 AM CDT

## 2025-07-08 NOTE — TELEPHONE ENCOUNTER
Refill Routing Note   Medication(s) are not appropriate for processing by [facility] for the following reason(s):        Patient not seen by provider within 15 months    [facility] action(s):  Defer   Requires appointment : Yes   Requires labs : Yes             Appointments  past 12m or future 3m with PCP    Date Provider   Last Visit   8/17/2023 SURINDER Merrill MD   Next Visit   Visit date not found SURINDER Merrill MD   ED visits in past 90 days: 0        Note composed:8:38 PM 07/07/2025

## 2025-07-10 RX ORDER — INSULIN GLARGINE 100 [IU]/ML
INJECTION, SOLUTION SUBCUTANEOUS
Qty: 30 ML | Refills: 0 | Status: SHIPPED | OUTPATIENT
Start: 2025-07-10

## 2025-07-10 NOTE — TELEPHONE ENCOUNTER
Limited refill approved.  In-office appointment required for more refills.    TO MY TEAM: Please help Lacey schedule appointment before they will run out of their medicine.

## 2025-07-16 ENCOUNTER — OFFICE VISIT (OUTPATIENT)
Dept: INTERNAL MEDICINE | Facility: CLINIC | Age: 70
End: 2025-07-16
Payer: MEDICARE

## 2025-07-16 VITALS
HEART RATE: 69 BPM | SYSTOLIC BLOOD PRESSURE: 120 MMHG | BODY MASS INDEX: 32.49 KG/M2 | OXYGEN SATURATION: 99 % | HEIGHT: 66 IN | WEIGHT: 202.19 LBS | DIASTOLIC BLOOD PRESSURE: 80 MMHG | TEMPERATURE: 98 F

## 2025-07-16 DIAGNOSIS — E66.9 TYPE 2 DIABETES MELLITUS WITH OBESITY: Chronic | ICD-10-CM

## 2025-07-16 DIAGNOSIS — E11.69 TYPE 2 DIABETES MELLITUS WITH OBESITY: Chronic | ICD-10-CM

## 2025-07-16 DIAGNOSIS — E78.5 HYPERLIPIDEMIA ASSOCIATED WITH TYPE 2 DIABETES MELLITUS: Primary | Chronic | ICD-10-CM

## 2025-07-16 DIAGNOSIS — E11.69 HYPERLIPIDEMIA ASSOCIATED WITH TYPE 2 DIABETES MELLITUS: Primary | Chronic | ICD-10-CM

## 2025-07-16 DIAGNOSIS — Z79.4 TYPE 2 DIABETES MELLITUS WITH OTHER SPECIFIED COMPLICATION, WITH LONG-TERM CURRENT USE OF INSULIN: Chronic | ICD-10-CM

## 2025-07-16 DIAGNOSIS — R80.9 TYPE 2 DIABETES MELLITUS WITH MICROALBUMINURIA, WITH LONG-TERM CURRENT USE OF INSULIN: Chronic | ICD-10-CM

## 2025-07-16 DIAGNOSIS — H25.012 CORTICAL SENILE CATARACT OF LEFT EYE: ICD-10-CM

## 2025-07-16 DIAGNOSIS — E11.69 TYPE 2 DIABETES MELLITUS WITH OTHER SPECIFIED COMPLICATION, WITH LONG-TERM CURRENT USE OF INSULIN: Chronic | ICD-10-CM

## 2025-07-16 DIAGNOSIS — I48.0 PAROXYSMAL ATRIAL FIBRILLATION: Chronic | ICD-10-CM

## 2025-07-16 DIAGNOSIS — I10 ESSENTIAL HYPERTENSION: Chronic | ICD-10-CM

## 2025-07-16 DIAGNOSIS — E78.49 OTHER HYPERLIPIDEMIA: ICD-10-CM

## 2025-07-16 DIAGNOSIS — E11.29 TYPE 2 DIABETES MELLITUS WITH MICROALBUMINURIA, WITH LONG-TERM CURRENT USE OF INSULIN: Chronic | ICD-10-CM

## 2025-07-16 DIAGNOSIS — E11.69 TYPE 2 DIABETES MELLITUS WITH OTHER SPECIFIED COMPLICATION, WITHOUT LONG-TERM CURRENT USE OF INSULIN: Chronic | ICD-10-CM

## 2025-07-16 DIAGNOSIS — Z79.4 TYPE 2 DIABETES MELLITUS WITH MICROALBUMINURIA, WITH LONG-TERM CURRENT USE OF INSULIN: Chronic | ICD-10-CM

## 2025-07-16 DIAGNOSIS — Z79.899 LONG TERM USE OF DRUG: ICD-10-CM

## 2025-07-16 PROBLEM — Z01.810 PREOP CARDIOVASCULAR EXAM: Status: RESOLVED | Noted: 2025-04-25 | Resolved: 2025-07-16

## 2025-07-16 LAB
ALBUMIN SERPL BCP-MCNC: 4.2 G/DL (ref 3.5–5.2)
ALBUMIN/CREAT UR: 7 UG/MG
ALP SERPL-CCNC: 75 UNIT/L (ref 40–150)
ALT SERPL W/O P-5'-P-CCNC: 12 UNIT/L (ref 10–44)
ANION GAP (OHS): 11 MMOL/L (ref 8–16)
AST SERPL-CCNC: 15 UNIT/L (ref 11–45)
BILIRUB SERPL-MCNC: 0.3 MG/DL (ref 0.1–1)
BUN SERPL-MCNC: 28 MG/DL (ref 8–23)
CALCIUM SERPL-MCNC: 10.1 MG/DL (ref 8.7–10.5)
CHLORIDE SERPL-SCNC: 105 MMOL/L (ref 95–110)
CO2 SERPL-SCNC: 29 MMOL/L (ref 23–29)
CREAT SERPL-MCNC: 0.9 MG/DL (ref 0.5–1.4)
CREAT UR-MCNC: 114 MG/DL (ref 15–325)
GFR SERPLBLD CREATININE-BSD FMLA CKD-EPI: >60 ML/MIN/1.73/M2
GLUCOSE SERPL-MCNC: 62 MG/DL (ref 70–110)
MICROALBUMIN UR-MCNC: 8 UG/ML (ref ?–5000)
POTASSIUM SERPL-SCNC: 4.1 MMOL/L (ref 3.5–5.1)
PROT SERPL-MCNC: 7.6 GM/DL (ref 6–8.4)
SODIUM SERPL-SCNC: 145 MMOL/L (ref 136–145)
VIT B12 SERPL-MCNC: 422 PG/ML (ref 210–950)

## 2025-07-16 PROCEDURE — 82607 VITAMIN B-12: CPT | Performed by: PHYSICIAN ASSISTANT

## 2025-07-16 PROCEDURE — 3072F LOW RISK FOR RETINOPATHY: CPT | Mod: CPTII,S$GLB,, | Performed by: PHYSICIAN ASSISTANT

## 2025-07-16 PROCEDURE — 1126F AMNT PAIN NOTED NONE PRSNT: CPT | Mod: CPTII,S$GLB,, | Performed by: PHYSICIAN ASSISTANT

## 2025-07-16 PROCEDURE — 99999 PR PBB SHADOW E&M-EST. PATIENT-LVL IV: CPT | Mod: PBBFAC,,, | Performed by: PHYSICIAN ASSISTANT

## 2025-07-16 PROCEDURE — 4010F ACE/ARB THERAPY RXD/TAKEN: CPT | Mod: CPTII,S$GLB,, | Performed by: PHYSICIAN ASSISTANT

## 2025-07-16 PROCEDURE — 3008F BODY MASS INDEX DOCD: CPT | Mod: CPTII,S$GLB,, | Performed by: PHYSICIAN ASSISTANT

## 2025-07-16 PROCEDURE — 80053 COMPREHEN METABOLIC PANEL: CPT | Performed by: PHYSICIAN ASSISTANT

## 2025-07-16 PROCEDURE — 3288F FALL RISK ASSESSMENT DOCD: CPT | Mod: CPTII,S$GLB,, | Performed by: PHYSICIAN ASSISTANT

## 2025-07-16 PROCEDURE — 1100F PTFALLS ASSESS-DOCD GE2>/YR: CPT | Mod: CPTII,S$GLB,, | Performed by: PHYSICIAN ASSISTANT

## 2025-07-16 PROCEDURE — 82570 ASSAY OF URINE CREATININE: CPT | Performed by: PHYSICIAN ASSISTANT

## 2025-07-16 PROCEDURE — 83036 HEMOGLOBIN GLYCOSYLATED A1C: CPT | Performed by: PHYSICIAN ASSISTANT

## 2025-07-16 PROCEDURE — 3044F HG A1C LEVEL LT 7.0%: CPT | Mod: CPTII,S$GLB,, | Performed by: PHYSICIAN ASSISTANT

## 2025-07-16 PROCEDURE — 99215 OFFICE O/P EST HI 40 MIN: CPT | Mod: S$GLB,,, | Performed by: PHYSICIAN ASSISTANT

## 2025-07-16 PROCEDURE — 3079F DIAST BP 80-89 MM HG: CPT | Mod: CPTII,S$GLB,, | Performed by: PHYSICIAN ASSISTANT

## 2025-07-16 PROCEDURE — 3074F SYST BP LT 130 MM HG: CPT | Mod: CPTII,S$GLB,, | Performed by: PHYSICIAN ASSISTANT

## 2025-07-16 PROCEDURE — 1159F MED LIST DOCD IN RCRD: CPT | Mod: CPTII,S$GLB,, | Performed by: PHYSICIAN ASSISTANT

## 2025-07-16 RX ORDER — KETOROLAC TROMETHAMINE 5 MG/ML
1 SOLUTION OPHTHALMIC 4 TIMES DAILY
Qty: 5 ML | Refills: 0 | Status: CANCELLED | OUTPATIENT
Start: 2025-07-16 | End: 2026-07-16

## 2025-07-16 RX ORDER — CARVEDILOL 6.25 MG/1
6.25 TABLET ORAL 2 TIMES DAILY
Qty: 180 TABLET | Refills: 2 | Status: SHIPPED | OUTPATIENT
Start: 2025-07-16

## 2025-07-16 RX ORDER — ROSUVASTATIN CALCIUM 20 MG/1
20 TABLET, COATED ORAL NIGHTLY
Qty: 90 TABLET | Refills: 1 | Status: SHIPPED | OUTPATIENT
Start: 2025-07-16 | End: 2026-07-16

## 2025-07-16 RX ORDER — VALSARTAN 320 MG/1
320 TABLET ORAL DAILY
Qty: 90 TABLET | Refills: 1 | Status: SHIPPED | OUTPATIENT
Start: 2025-07-16 | End: 2026-07-16

## 2025-07-16 RX ORDER — INSULIN LISPRO 100 [IU]/ML
INJECTION, SOLUTION INTRAVENOUS; SUBCUTANEOUS
Qty: 15 ML | Refills: 5 | Status: SHIPPED | OUTPATIENT
Start: 2025-07-16

## 2025-07-16 RX ORDER — CHLORTHALIDONE 25 MG/1
25 TABLET ORAL DAILY
Qty: 90 TABLET | Refills: 1 | Status: SHIPPED | OUTPATIENT
Start: 2025-07-16

## 2025-07-16 RX ORDER — POTASSIUM CHLORIDE 750 MG/1
10 TABLET, EXTENDED RELEASE ORAL DAILY
Qty: 90 TABLET | Refills: 3 | Status: SHIPPED | OUTPATIENT
Start: 2025-07-16

## 2025-07-16 RX ORDER — METFORMIN HYDROCHLORIDE 500 MG/1
500 TABLET, EXTENDED RELEASE ORAL DAILY
Qty: 90 TABLET | Refills: 3 | Status: SHIPPED | OUTPATIENT
Start: 2025-07-16

## 2025-07-16 RX ORDER — CHLORTHALIDONE 25 MG/1
25 TABLET ORAL
Qty: 90 TABLET | Refills: 0 | Status: CANCELLED | OUTPATIENT
Start: 2025-07-16

## 2025-07-16 RX ORDER — INSULIN GLARGINE 100 [IU]/ML
INJECTION, SOLUTION SUBCUTANEOUS
Qty: 30 ML | Refills: 0 | Status: SHIPPED | OUTPATIENT
Start: 2025-07-16

## 2025-07-16 NOTE — PROGRESS NOTES
Subjective:      Patient ID: Lacey Calderón is a 70 y.o. female.    Chief Complaint: Follow-up and Medication Refill      HPI  Patient is here today for chronic follow-up-  She did not bring her medications with her today and is a poor historian on the medication she takes daily.  I did encourage her to bring all of her medications to her future visits in order to provide the most adequate care for her.  Patient verbalized understanding.  As a result there maybe inconsistencies on her medication list today.    DM2, hyperlipidemia, and obesity-she has experienced some highs  This only occurred during days that she ate excessively sugary meals.  Reports she had glucose spikes-greater than 200.  No lows.  States that this event happened once after eating ice cream and cookies.  Discussed eating simple sugars only in moderation.  Patient currently takes-  Metformin 500 mg ER daily  Lantus 30U nightly  insulin lispro 100 unit/mL pen; injects 9 units before meal)   Never checks glucose at home.  I did provide her with a log to check periodically.  We will draw A1c today.  Patient is not fasting-can not check lipids  --Hyperlipidemia-on statin, tolerating medication well.  Due for lab    Afib, HTN-sees cardiology q3 mo  Current regimen-Amlodipine, Eliquis, carvedilol, valsartan, chlorthalidone.  Blood pressure well controlled.  Rate well controlled.  Patient anticoagulated.    Cataracts - followed by ophthalmology.  Patient has deferred surgery for now.    Review of Systems   Constitutional:  Negative for activity change, appetite change, chills, diaphoresis, fatigue and unexpected weight change.   Eyes:  Negative for visual disturbance.   Respiratory:  Negative for cough, chest tightness, shortness of breath and wheezing.    Cardiovascular:  Negative for chest pain, palpitations and leg swelling.   Gastrointestinal:  Negative for abdominal pain, constipation, nausea and vomiting.   Neurological:  Negative for dizziness,  "vertigo, tremors, syncope, weakness, light-headedness, numbness and headaches.   Psychiatric/Behavioral:  Negative for agitation, behavioral problems, confusion, decreased concentration, dysphoric mood, hallucinations, self-injury, sleep disturbance and suicidal ideas. The patient is not nervous/anxious and is not hyperactive.        Medication List with Changes/Refills   Current Medications    AMLODIPINE (NORVASC) 10 MG TABLET    Take 1 tablet by mouth once daily    APIXABAN (ELIQUIS) 5 MG TAB    Take 1 tablet by mouth twice daily    BLOOD SUGAR DIAGNOSTIC STRP    Check blood glucose 4 times daily (BEFORE breakfast or other meal) and as needed for symptoms of low or high blood glucose    BLOOD-GLUCOSE METER KIT    Check blood glucose 4 times daily (BEFORE breakfast or other meal) and as needed for symptoms of low or high blood glucose    CALCIUM CARBONATE-VITAMIN D3 (CALCIUM 600 WITH VITAMIN D3) 600 MG-12.5 MCG (500 UNIT) CAP    Take by mouth 2 (two) times a day.    DORZOLAMIDE-TIMOLOL 2-0.5% (COSOPT) 22.3-6.8 MG/ML OPHTHALMIC SOLUTION    Place 1 drop into both eyes every 12 (twelve) hours.    EMPTY CONTAINER (SHARPS CONTAINER) MISC    Use to dispose sharps    IBUPROFEN (ADVIL,MOTRIN) 600 MG TABLET    Take 600 mg by mouth 3 (three) times daily.    KETOROLAC 0.5% (ACULAR) 0.5 % DROP    Place 1 drop into the left eye 4 (four) times daily.    LANCETS MISC    Check blood glucose 4 times daily (BEFORE breakfast or other meal) and as needed for symptoms of low or high blood glucose    PEN NEEDLE, DIABETIC (BD ULTRA-FINE SHORT PEN NEEDLE) 31 GAUGE X 5/16" NDLE    USE AS DIRECTED TO  INJECT  INSULIN  AS  PRESCRIBED  BY  [facility]  [facility] Strength: 31 gauge x 5/16"    PREDNISOLONE ACETATE (PRED FORTE) 1 % DRPS    Place 1 drop into the left eye 4 (four) times daily.    RSVPREF3 ANTIGEN-AS01E, PF, (AREXVY) 120 MCG/0.5 ML SUSR VACCINE    Pharmacist inject 0.5 mL intramuscular for one dose according to CDC guidance and pharmacy " "protocol.   Changed and/or Refilled Medications    Modified Medication Previous Medication    CARVEDILOL (COREG) 6.25 MG TABLET carvediloL (COREG) 6.25 MG tablet       Take 1 tablet (6.25 mg total) by mouth 2 (two) times daily.    Take 1 tablet (6.25 mg total) by mouth 2 (two) times daily.    CHLORTHALIDONE (HYGROTEN) 25 MG TAB chlorthalidone (HYGROTEN) 25 MG Tab       Take 1 tablet (25 mg total) by mouth once daily.    Take 1 tablet by mouth once daily    INSULIN LISPRO 100 UNIT/ML PEN insulin lispro 100 unit/mL pen       INJECT 9 UNITS SUBCUTANEOUSLY THREE TIMES DAILY BEFORE MEAL(S)    INJECT 9 UNITS SUBCUTANEOUSLY THREE TIMES DAILY BEFORE MEAL(S)    LANTUS SOLOSTAR U-100 INSULIN 100 UNIT/ML (3 ML) INPN PEN LANTUS SOLOSTAR U-100 INSULIN 100 unit/mL (3 mL) InPn pen       INJECT 30 UNITS SUBCUTANEOUSLY NIGHTLY    INJECT 30 UNITS SUBCUTANEOUSLY NIGHTLY    METFORMIN (GLUCOPHAGE-XR) 500 MG ER 24HR TABLET metFORMIN (GLUCOPHAGE-XR) 500 MG ER 24hr tablet       Take 1 tablet (500 mg total) by mouth once daily.    Take 1 tablet (500 mg total) by mouth once daily.    POTASSIUM CHLORIDE SA (K-DUR,KLOR-CON M) 10 MEQ TABLET potassium chloride SA (K-DUR,KLOR-CON M) 10 MEQ tablet       Take 1 tablet (10 mEq total) by mouth once daily.    Take 1 tablet (10 mEq total) by mouth once daily.    ROSUVASTATIN (CRESTOR) 20 MG TABLET rosuvastatin (CRESTOR) 20 MG tablet       Take 1 tablet (20 mg total) by mouth every evening.    Take 1 tablet (20 mg total) by mouth every evening.    VALSARTAN (DIOVAN) 320 MG TABLET valsartan (DIOVAN) 320 MG tablet       Take 1 tablet (320 mg total) by mouth once daily.    Take 1 tablet (320 mg total) by mouth once daily.        Objective:     Vitals:    07/16/25 0927   BP: 120/80   Pulse: 69   Temp: 97.6 °F (36.4 °C)   TempSrc: Tympanic   SpO2: 99%   Weight: 91.7 kg (202 lb 2.6 oz)   Height: 5' 6" (1.676 m)        Physical Exam  Constitutional:       Appearance: Normal appearance. She is normal weight. "   HENT:      Head: Normocephalic and atraumatic.      Nose: Nose normal.   Eyes:      Conjunctiva/sclera: Conjunctivae normal.      Comments: Eyes tracking normal on exam    Cardiovascular:      Rate and Rhythm: Normal rate and regular rhythm.      Pulses: Normal pulses.      Heart sounds: Normal heart sounds. No murmur heard.     No friction rub. No gallop.   Pulmonary:      Effort: Pulmonary effort is normal. No respiratory distress.      Breath sounds: Normal breath sounds. No stridor. No wheezing, rhonchi or rales.   Musculoskeletal:      Right lower leg: No edema.      Left lower leg: No edema.      Comments: Moves all extremities well and with good control  Normal gait observed      Skin:     General: Skin is warm and dry.      Capillary Refill: Capillary refill takes less than 2 seconds.   Neurological:      Mental Status: She is alert and oriented to person, place, and time.   Psychiatric:         Mood and Affect: Mood normal.         Behavior: Behavior normal.         Thought Content: Thought content normal.         Judgment: Judgment normal.            Assessment & Plan:     1. Hyperlipidemia complicating type 2 diabetes  -     Comprehensive Metabolic Panel; Future; Expected date: 07/16/2025  -     Microalbumin/Creatinine Ratio, Urine; Future; Expected date: 07/16/2025  -     Hemoglobin A1C; Future; Expected date: 07/16/2025  -     Vitamin B12; Future; Expected date: 07/16/2025  -     rosuvastatin (CRESTOR) 20 MG tablet; Take 1 tablet (20 mg total) by mouth every evening.  Dispense: 90 tablet; Refill: 1  Continue statin, patient nonfasting today.  Have fasting labs done prior to follow-up with Dr. Merrill  2. Essential hypertension  -     Comprehensive Metabolic Panel; Future; Expected date: 07/16/2025  -     Microalbumin/Creatinine Ratio, Urine; Future; Expected date: 07/16/2025  -     Hemoglobin A1C; Future; Expected date: 07/16/2025  -     Vitamin B12; Future; Expected date: 07/16/2025  -     Cancel:  CBC Auto Differential; Future; Expected date: 07/16/2025  -     carvediloL (COREG) 6.25 MG tablet; Take 1 tablet (6.25 mg total) by mouth 2 (two) times daily.  Dispense: 180 tablet; Refill: 2  -     chlorthalidone (HYGROTEN) 25 MG Tab; Take 1 tablet (25 mg total) by mouth once daily.  Dispense: 90 tablet; Refill: 1  -     potassium chloride SA (K-DUR,KLOR-CON M) 10 MEQ tablet; Take 1 tablet (10 mEq total) by mouth once daily.  Dispense: 90 tablet; Refill: 3  -     valsartan (DIOVAN) 320 MG tablet; Take 1 tablet (320 mg total) by mouth once daily.  Dispense: 90 tablet; Refill: 1  Well controlled on current, having labs done today.  Continue to follow up with Cardiology  3. Cortical senile cataract of left eye  -continue to follow-up with ophthalmology for care  4. Type 2 diabetes mellitus with other specified complication, without long-term current use of insulin  -     Lipid Panel; Future; Expected date: 10/14/2025  -     Cancel: Hemoglobin A1C  Limits simple sugars and sweets.  Continue current med management.  Keep glucose log twice a day.  Check additionally for any symptoms of highs or lows  5. Obesity complicating type 2 diabetes mellitus  Limits simple sugars and sweets.  Continue current med management.  Keep glucose log twice a day.  Check additionally for any symptoms of highs or lows  6. Paroxysmal atrial fibrillation  -     Comprehensive Metabolic Panel; Future; Expected date: 07/16/2025  -     Microalbumin/Creatinine Ratio, Urine; Future; Expected date: 07/16/2025  -     Hemoglobin A1C; Future; Expected date: 07/16/2025  -     Vitamin B12; Future; Expected date: 07/16/2025  Well controlled on current, having labs done today.  Continue to follow up with Cardiology  7. Type 2 diabetes mellitus with microalbuminuria, with long-term current use of insulin  -     Comprehensive Metabolic Panel; Future; Expected date: 07/16/2025  -     Microalbumin/Creatinine Ratio, Urine; Future; Expected date: 07/16/2025  -      Hemoglobin A1C; Future; Expected date: 07/16/2025  -     Vitamin B12; Future; Expected date: 07/16/2025  -     insulin lispro 100 unit/mL pen; INJECT 9 UNITS SUBCUTANEOUSLY THREE TIMES DAILY BEFORE MEAL(S)  Dispense: 15 mL; Refill: 5  -     LANTUS SOLOSTAR U-100 INSULIN 100 unit/mL (3 mL) InPn pen; INJECT 30 UNITS SUBCUTANEOUSLY NIGHTLY  Dispense: 30 mL; Refill: 0  -     metFORMIN (GLUCOPHAGE-XR) 500 MG ER 24hr tablet; Take 1 tablet (500 mg total) by mouth once daily.  Dispense: 90 tablet; Refill: 3  Limits simple sugars and sweets.  Continue current med management.  Keep glucose log twice a day.  Check additionally for any symptoms of highs or lows  8. Type 2 diabetes mellitus with other specified complication, with long-term current use of insulin  -     Comprehensive Metabolic Panel; Future; Expected date: 07/16/2025  -     Microalbumin/Creatinine Ratio, Urine; Future; Expected date: 07/16/2025  -     Hemoglobin A1C; Future; Expected date: 07/16/2025  -     Vitamin B12; Future; Expected date: 07/16/2025  -     insulin lispro 100 unit/mL pen; INJECT 9 UNITS SUBCUTANEOUSLY THREE TIMES DAILY BEFORE MEAL(S)  Dispense: 15 mL; Refill: 5  -     LANTUS SOLOSTAR U-100 INSULIN 100 unit/mL (3 mL) InPn pen; INJECT 30 UNITS SUBCUTANEOUSLY NIGHTLY  Dispense: 30 mL; Refill: 0  -     metFORMIN (GLUCOPHAGE-XR) 500 MG ER 24hr tablet; Take 1 tablet (500 mg total) by mouth once daily.  Dispense: 90 tablet; Refill: 3  Limits simple sugars and sweets.  Continue current med management.  Keep glucose log twice a day.  Check additionally for any symptoms of highs or lows  9. Long term use of drug  -     Comprehensive Metabolic Panel; Future; Expected date: 07/16/2025  -     Microalbumin/Creatinine Ratio, Urine; Future; Expected date: 07/16/2025  -     Hemoglobin A1C; Future; Expected date: 07/16/2025  -     Vitamin B12; Future; Expected date: 07/16/2025    10. Other hyperlipidemia  -Continue Crestor       Patient had labs done today.   No med changes today.  Refilled meds, closed care gaps and addressed H cc.  Patient to have four-month follow up with Dr. Merrill, needs lipids checked at next visit.  I placed the orders today      -Patient instructed that our office calls back on all labs and imaging within 1 week of receiving the results. Patient instructed to reach out to our office if they have not heard from us so that we can request the results from the lab/imaging center    Total time spent with patient in room, charting, equals 40 minutes       Nimco Luevano PA-C

## 2025-07-17 LAB
EAG (OHS): 120 MG/DL (ref 68–131)
HBA1C MFR BLD: 5.8 % (ref 4–5.6)